# Patient Record
Sex: FEMALE | Race: WHITE | NOT HISPANIC OR LATINO | Employment: FULL TIME | URBAN - METROPOLITAN AREA
[De-identification: names, ages, dates, MRNs, and addresses within clinical notes are randomized per-mention and may not be internally consistent; named-entity substitution may affect disease eponyms.]

---

## 2017-01-09 ENCOUNTER — TELEPHONE (OUTPATIENT)
Dept: RHEUMATOLOGY | Facility: CLINIC | Age: 52
End: 2017-01-09

## 2017-01-09 ENCOUNTER — TELEPHONE (OUTPATIENT)
Dept: NURSING | Facility: CLINIC | Age: 52
End: 2017-01-09

## 2017-01-09 NOTE — TELEPHONE ENCOUNTER
"Call Type: Triage Call    Presenting Problem: Caller is returning call from clinic;  Following  message in EPIC;  \"Wendi Armenta LPN at 1/9/2017 ?4:13 PM  Status: Signed      ? Expand All Collapse All      New to Dr. Flores  please request rheumatology records prior to appointment.  Wendi Armenta LPN        Read to patient verbatim; will call clinic.  Triage Note:  Guideline Title: Information Only Call; No Symptom Triage (Adult) ;  Information Only Call; No Symptom Triage (Adult)  Recommended Disposition: Provide Information or Advice Only  Original Inclination:  Override Disposition:  Intended Action:  Physician Contacted: No  General information question; no triage required. Information provided from  approved references or knowledge of organization. ?  YES  Requesting regular office appointment ? NO  Sign(s) or symptom(s) associated with a diagnosed condition or with a new illness  ? NO  Requesting information about provider, services or community resources ? NO  Call back to complete assessment/clarification of information from prior caller to  complete triage ? NO  Requesting information and provider is best resource; no triage required. ? NO  Caller not with patient and is unable to provide clinical information about  patient to facilitate triage. ? NO  Requesting provider information for recently scheduled test, procedure; no triage  required. Needed information not available per approved resources or clinical  experience. ? NO  Requesting information not available per approved reference or clinical  experience; no triage required. ? NO  Requesting information regarding scheduled exam, test or procedure; no triage  required. Information provided from approved resources or clinical experience. ?  NO  Physician Instructions:  Care Advice:  "

## 2017-01-13 ENCOUNTER — TELEPHONE (OUTPATIENT)
Dept: OBGYN | Facility: CLINIC | Age: 52
End: 2017-01-13

## 2017-01-13 DIAGNOSIS — N95.1 SYMPTOMATIC MENOPAUSAL OR FEMALE CLIMACTERIC STATES: ICD-10-CM

## 2017-01-13 NOTE — TELEPHONE ENCOUNTER
Reason for call:  Patient reporting a symptom    Symptom or request: Was put on the combo patch - has had maybe one week were she hasn't had any bleeding.  Yesterday started bleeding really really heavy - states she has gone through menopause.    Duration (how long have symptoms been present): since yesterday    Have you been treated for this before? No    Additional comments:     Phone Number patient can be reached at:  Home number on file [phone] (home)    Best Time:  any    Can we leave a detailed message on this number:  YES    Call taken on 1/13/2017 at 8:18 AM by Rosie London

## 2017-01-13 NOTE — TELEPHONE ENCOUNTER
Return call to patient.    S-(situation): vaginal bleeding,    B-(background): post menopause    A-(assessment): Patient reports using Combi patch for the last 3 months for menopausal symptoms - patient reports bleeding every day for the last 3 months except for 7 days. Patient reports yesterday bleeding worsened and became heavy. Patient feels today she is saturating more than a pad per hour. Patient denies headache. Patient denies that she is currently dizzy or lightheaded. Patient due to replace patch today.    R-(recommendations): Huddled with Dr. Dumont. Recommendation made and orders received for patient to have complete pelvic US prior to clinic appointment, clinic appointment next week, patient may refrain from replacing patch today and assess bleeding. Other symptoms associated with menopause may return when off Combi patch. Bleeding guidelines/precautions reviewed with patient and when to be seen in the ER. Patient scheduled for appointment next week on 1/17/17.    Patient reports understanding.    Makayla Min   [facility]  RN

## 2017-01-15 ENCOUNTER — HOSPITAL ENCOUNTER (OUTPATIENT)
Dept: ULTRASOUND IMAGING | Facility: CLINIC | Age: 52
Discharge: HOME | End: 2017-01-15
Attending: OBSTETRICS & GYNECOLOGY | Admitting: OBSTETRICS & GYNECOLOGY
Payer: COMMERCIAL

## 2017-01-15 DIAGNOSIS — N95.1 SYMPTOMATIC MENOPAUSAL OR FEMALE CLIMACTERIC STATES: ICD-10-CM

## 2017-01-15 PROCEDURE — 76830 TRANSVAGINAL US NON-OB: CPT

## 2017-01-17 ENCOUNTER — OFFICE VISIT (OUTPATIENT)
Dept: OBGYN | Facility: CLINIC | Age: 52
End: 2017-01-17
Payer: COMMERCIAL

## 2017-01-17 VITALS
BODY MASS INDEX: 23.96 KG/M2 | DIASTOLIC BLOOD PRESSURE: 71 MMHG | HEART RATE: 59 BPM | WEIGHT: 144 LBS | SYSTOLIC BLOOD PRESSURE: 108 MMHG

## 2017-01-17 DIAGNOSIS — N95.0 POSTMENOPAUSAL BLEEDING: Primary | ICD-10-CM

## 2017-01-17 PROCEDURE — 58100 BIOPSY OF UTERUS LINING: CPT | Performed by: OBSTETRICS & GYNECOLOGY

## 2017-01-17 PROCEDURE — 99214 OFFICE O/P EST MOD 30 MIN: CPT | Mod: 25 | Performed by: OBSTETRICS & GYNECOLOGY

## 2017-01-17 PROCEDURE — 88305 TISSUE EXAM BY PATHOLOGIST: CPT | Performed by: OBSTETRICS & GYNECOLOGY

## 2017-01-17 NOTE — PROGRESS NOTES
Postmenopausal bleeding    Ms. Tequila Neumann 51 year old P2 (SVDx2) presents for postmenopausal bleeding in the setting of current hormone replacement therapy to address hot flashes and difficulty sleeping. She was prescribed Combi-patch in 9/2016. She reports bleeding started shortly after initiating the Combi-patch, but was on & off and tolerable. Since Thanksgiving 2016, the bleeding got heavier but was still off and on lasting for only a couple of days. This past week, the bleeding not only remained heavy but was prolonged. It lasted for 5 days before she felt concerned enough to call about her symptoms. She was instructed to stop the Combi-patch and since then she has noted improvement in her bleeding. She reports excellent of her hot flashes and sleeping difficulties with Combi-patch and since stopping it, her symptoms have not recurred. Otherwise, she has no other issues or concerns.     Of note, she has been menopausal for approximately 2 years. She is a current smoker.       Past Medical History   Diagnosis Date     Abnormal Pap smear of cervix 1985     s/p LEEP     Calculus of GB w/ other cystitis 1/3/2012     Past Surgical History   Procedure Laterality Date     Tubal ligation  1998     Lasik  2000     Leep tx, cervical  1985     D & c  x 2     Laparoscopic cholecystectomy  1/25/2012     Procedure:LAPAROSCOPIC CHOLECYSTECTOMY; Laparoscopic vs Open Cholecystectomy; Surgeon:RANDELL MCGARRY; Location:[facility] [facility]     Esophagoscopy, gastroscopy, duodenoscopy (egd), combined  7/16/2012     Procedure: COMBINED ESOPHAGOSCOPY, GASTROSCOPY, DUODENOSCOPY (EGD);;  Surgeon: Lito Kwon MD;  Location:  GI     Arthroscopy knee rt/lt  12/8/2005     Left medial meniscal tear bilat     Hc esoph/gas reflux test w nasal imped electrode  8/23/2012     Procedure: ESOPHAGEAL IMPEDENCE FUNCTION TEST 1 HOUR OR LESS;  Surgeon: Tequila Boone MD;  Location:  GI     Colonoscopy  7/11/2014      Procedure: COLONOSCOPY;  Surgeon: Viridiana Bonilla MD;  Location: [facility]     Current Outpatient Prescriptions   Medication     levothyroxine (SYNTHROID, LEVOTHROID) 125 MCG tablet     estradiol-norethindrone (COMBIPATCH) 0.05-0.14 MG/DAY     clonazePAM (KLONOPIN) 0.5 MG tablet     Multiple Vitamins-Minerals (THRIVE FOR LIFE WOMENS PO)     tiZANidine (ZANAFLEX) 2 MG tablet     propranolol 60 MG TABS     OnabotulinumtoxinA (BOTOX IJ)     Abatacept (ORENCIA) 125 MG/ML SOSY     azaTHIOprine (IMURAN) 50 MG tablet     cholecalciferol (VITAMIN D) 1000 UNIT tablet     No current facility-administered medications for this visit.        Allergies   Allergen Reactions     Sulfa Drugs Nausea     Topamax      Personality change. Memory      Plaquenil [Hydroxychloroquine Sulfate] Rash     C: NEGATIVE for fever, chills, change in weight  R: NEGATIVE for significant cough or SOB  CV: NEGATIVE for chest pain, palpitations or peripheral edema  GI: NEGATIVE for nausea, abdominal pain, heartburn, or change in bowel habits  : NEGATIVE for frequency, dysuria, hematuria, vaginal discharge    Exam:   /71 mmHg  Pulse 59  Wt 144 lb (65.318 kg)  LMP 02/20/2015 (Exact Date)  Breastfeeding? No  General Appearance: NAD  Abdomen: Soft, NT, ND  Pelvic: Normal external female genitalia.  No external lesions, normal hair distribution, no adenopathy. Speculum exam reveals vaginal epithelium well rugated with no abnormal discharge. Cervix appears smooth, pink, with no visible lesions. Bimanual exam reveals normal size uterus, anteverted, non-tender, and mobile. No adnexal masses or tenderness. No cervical motion tenderness.     Procedure: Endometrial biopsy  After informed consent was obtained from the patient, a speculum was placed in the vagina to visualize the cervix which was anteverted.  The cervix was then swabbed with a betadine prep and a paracervical block placed w/ 1% buffered lidocaine.  Tenaculum was applied to the  anterior cervical lip. Endometrial biopsy pipelle could not be passed through the cervical os initially until the cervical os finder was used to dilate the cervix and tissue was obtained.  Uterus sounded to 10 cm.  Tenaculum was removed and sites were hemostatic. There were no complications. The patient tolerated the procedure well with a minimal amount of cramping noted.  Specimen was sent to pathology.    Imaging:   Pelvic US 1/2017  FINDINGS:  Transvaginal images were performed to better evaluate the patient's uterus, ovaries and endometrial stripe.   Four discrete fibroids are noted the largest measuring 4.0 x 2.9 x 3.4 cm which is subserosal in the mid uterus. Additional smaller myometrial fibroids are noted at the fundus and mid uterus posteriorly. The uterus is 8.5 x 5.2 x 6.8 cm. Endometrial stripe measures 4 mm and is normal for patient's age and menstrual status. The right ovary is normal. The left ovary is normal.  No adnexal masses are present. No free pelvic fluid is present.    IMPRESSION: Fibroid uterus.   LAI TAVAREZ MD    A/P:  Postmenopausal bleeding  -- likely from hormone replacement therapy  -- reviewed pelvic ultrasound report and imaging with patient; uncertain if fibroids are contributing to bleeding but given that they are not encroaching the endometrial cavity not likely cause for bleeding  -- endometrial biopsy collected; although low yield for endometrial hyperplasia and cancer given thin endometrial stripe  -- patient elects to not resume hormone replacement therapy at this time and observe for resolution of bleeding; if vasomotor symptoms recur, then reviewed alternative non-hormonal methods given symptoms but also given her current smoking history ie discussed SSRI, gabapentin and clonidine options with associated side effects and relative contraindications   -- patient conveyed understanding of discussion  -- post-biopsy bleeding precautions reviewed    Dalia Dumont,  MD  [facility]

## 2017-01-17 NOTE — NURSING NOTE
"Chief Complaint   Patient presents with     Abnormal Uterine Bleeding     improving       Initial /71 mmHg  Pulse 59  Wt 144 lb (65.318 kg)  LMP 02/20/2015 (Exact Date)  Breastfeeding? No Estimated body mass index is 23.96 kg/(m^2) as calculated from the following:    Height as of 11/30/16: 5' 5\" (1.651 m).    Weight as of this encounter: 144 lb (65.318 kg).  BP completed using cuff size: regular  Katina Mancuso      "

## 2017-01-17 NOTE — PROGRESS NOTES
Quick Note:    Inform patient that her pelvic ultrasound shows four discrete fibroids on her uterus. Emphasize that fibroids are benign. None of them appear to be encroaching in the uterine cavity in order for them to be contributing to the bleeding, but fibroids are responsive to the estrogen in hormone replacement therapy and that might eliciting the bleeding as well. Its hard to say. Maybe lowering the estrogen component of the hormone replacement therapy will hopefully make the difference in preventing her bleeding while at the same time addressing her hot flashes. She will still need to come in for an endometrial biopsy.     Dalia Dumont MD  [facility]      ______

## 2017-01-19 LAB — COPATH REPORT: NORMAL

## 2017-01-19 NOTE — PROGRESS NOTES
Quick Note:    Inform patient that her endometrial biopsy returned negative,     Dalia Dumont MD  [facility]      ______

## 2017-01-20 NOTE — PROGRESS NOTES
Quick Note:    Will forward to [facility] pool for pt notification of normal result.    Makayla Min   [facility] RN    ______

## 2017-01-23 NOTE — PROGRESS NOTES
Quick Note:    In the postmenopausal period, fibroids dont usually present a problem since there is no estrogen feeding them to grow bigger in size. They are 99% benign, so no concern for them at all and no further treatment needed for them    Dalia Dumont MD  [facility]      ______

## 2017-01-23 NOTE — PROGRESS NOTES
Quick Note:    Pt notified of below.  Pt reports understanding.  Pt does not have further questions or concerns.    Patient asking what the next step would be regarding the fibroids?    Makayla Min   [facility] RN      ______

## 2017-03-07 ENCOUNTER — TELEPHONE (OUTPATIENT)
Dept: NEUROLOGY | Facility: CLINIC | Age: 52
End: 2017-03-07

## 2017-03-07 DIAGNOSIS — G25.0 ESSENTIAL TREMOR: ICD-10-CM

## 2017-03-07 RX ORDER — PROPRANOLOL HYDROCHLORIDE 60 MG/1
60 TABLET ORAL 2 TIMES DAILY
Qty: 60 TABLET | Refills: 5 | Status: SHIPPED
Start: 2017-03-07 | End: 2017-11-03

## 2017-03-07 NOTE — TELEPHONE ENCOUNTER
[facility] pharmacy questioning dosage from Rx received today. Propranolol 60 mg 1 tablet twice daily. Verified that pt is on prescribed dose via chart review and progress notes along with prior Rx. [facility] pharmacy did not fill prior Rx and was not aware of the change.   Kassandra DUFFY RN  [facility]

## 2017-03-08 ENCOUNTER — TELEPHONE (OUTPATIENT)
Dept: OBGYN | Facility: CLINIC | Age: 52
End: 2017-03-08

## 2017-03-08 DIAGNOSIS — N95.1 MENOPAUSAL SYNDROME (HOT FLASHES): Primary | ICD-10-CM

## 2017-03-08 NOTE — TELEPHONE ENCOUNTER
1/17/17 office visit notes as below    patient elects to not resume hormone replacement therapy at this time and observe for resolution of bleeding; if vasomotor symptoms recur, then reviewed alternative non-hormonal methods given symptoms but also given her current smoking history ie discussed SSRI, gabapentin and clonidine options with associated side effects and relative contraindications     Please review and advise.  Thank you.    Makayla Min   [facility]  RN

## 2017-03-08 NOTE — TELEPHONE ENCOUNTER
Reason for Call:  Other     Detailed comments: Pt would like something for hot flashes and sleeping. Was seen in January and told maybe gabapentin would be helpful.     PHARMACY:  [facility]    Phone Number Patient can be reached at: Cell number on file:    Telephone Information:   Mobile [phone]       Best Time: Any    Can we leave a detailed message on this number? YES    Call taken on 3/8/2017 at 3:09 PM by Denise Behrendt

## 2017-03-09 RX ORDER — GABAPENTIN 100 MG/1
100 CAPSULE ORAL AT BEDTIME
Qty: 90 CAPSULE | Refills: 3 | Status: SHIPPED | OUTPATIENT
Start: 2017-03-09 | End: 2017-05-22

## 2017-03-09 NOTE — TELEPHONE ENCOUNTER
Inform patient that gabapentin has been prescribed with instructions for 100 mg qHS (preferably one hour before going to bed and not as she is going to bed).   She is then to titrate up every 3 nights by 100 mg if needed for more efficacy of the medication to address her hot flashes until she gets to total dose of 300 mg qHS  If more increase dosing is needed, then it must be divided throughout the day for a total dose of 900 mg/day.   Also the pharmacy that she requesting for me to sent it to does not have e-scribing capabilities apparently. So I have printed it out and put it on Karla's desk for it to be faxed to that pharmacy.     Dalia Dumont MD  [facility]

## 2017-03-13 DIAGNOSIS — G24.3 SPASMODIC TORTICOLLIS: ICD-10-CM

## 2017-03-13 RX ORDER — CLONAZEPAM 0.5 MG/1
0.5 TABLET ORAL AT BEDTIME
Qty: 90 TABLET | Refills: 2 | Status: SHIPPED | OUTPATIENT
Start: 2017-03-13 | End: 2017-09-25

## 2017-03-13 NOTE — TELEPHONE ENCOUNTER
"Refill request for clonazepam 0.5 mg.    clonazePAM (KLONOPIN) 0.5 MG tablet 90 tablet 2 8/19/2016  No      Sig: Take 1 tablet (0.5 mg) by mouth At Bedtime         Routed to Dr Leija for refill. Dr Leija not prescribing physician.   Dr Marie last prescribing.    Per Dr Leija office visit note:  \"We discussed that the clonazepam, if she feels is not effective, should be stopped at some point. She is currently using the medication for sleep. I think it would be safer for her to try an alternative medication. I will defer this to her primary care provider.\"    Please review and advise.  Kassandra DUFFY RN  [facility]             "

## 2017-04-14 ENCOUNTER — TELEPHONE (OUTPATIENT)
Dept: FAMILY MEDICINE | Facility: CLINIC | Age: 52
End: 2017-04-14

## 2017-04-21 NOTE — TELEPHONE ENCOUNTER
Patient would like  To have labs on Friday 4-29-17 or todayif possible   Please call and advise  Rabia Mueller- KATHI

## 2017-04-24 NOTE — TELEPHONE ENCOUNTER
"  S:   Lab Results   Component Value Date    TSH 1.11 08/01/2016    TSH 2.35 01/19/2016       Per chart, she is on :   levothyroxine (SYNTHROID, LEVOTHROID) 125 MCG tablet 90 tablet 3 10/3/2016  No       Sig: Take 1 tablet (125 mcg) by mouth daily     B: I called her. \"I have been really really tired, and I wonder if I can get my thyroid checked, and what about my blood? Maybe I am anemic? \"  Suggested she schedule exam first and she said \"I want the blood tested, I am too busy at work to come in and if he won't order it then I will find me a different doctor. \"    Dr Lindsay is listed PCP, and she last saw him since 6/23/14  Her call initially was to Dr Lindsay.   I do see she was in one time in November 2016 to see Dr Romero,  And also saw Dr Park 9/26/16 for routine health exam.     Will route this note/ request to Dr Romero and Dr Park, who have seen her more recently than Dr Lindsay.     A; asking for labs for thyroid and \"anemia\", are these ok? What diagnosis?   R: note to providers she has seen more recently  Rosa Bradford RNC      "

## 2017-04-24 NOTE — TELEPHONE ENCOUNTER
Message left for pt to call clinic. Will provide below information. That both Dr Lindsay and Dr Baird are recommending that pt be seen. Message has also been routed to Dr Romero. No response from her yet.     Crystal Hernandez RN

## 2017-04-24 NOTE — TELEPHONE ENCOUNTER
Agree that patient needs f/u with FP.  Please assist patient with scheduling follow up visit.      Since she was seen by [facility] 9/2016, will give 30 day susan period, but this issue really should be addressed by PCP.    Radha Baird M.D.

## 2017-04-24 NOTE — TELEPHONE ENCOUNTER
Patient needs to be seen by a provider.  I am not ordering anything at this time since I have not seen her in nearly 3 years.  Sincerely,  Tej Lindsay MD

## 2017-04-25 NOTE — TELEPHONE ENCOUNTER
I agree that she should really come in for an appointment so she can have a more in depth discussion about her symptoms and have a physical exam done.  By just ordering labs without seeing her, we could miss something.      Kanu Romero MD

## 2017-04-25 NOTE — TELEPHONE ENCOUNTER
"Dr Romero, please see note below. You last saw her on 11/30/16.   She is requesting labs.   Others have declined for now.  Do you want to order labs? She was adamant that she is unable to come in for an appt right now and \"just wants labs\"  Rosa Bradford RNC      "

## 2017-05-22 ENCOUNTER — OFFICE VISIT (OUTPATIENT)
Dept: FAMILY MEDICINE | Facility: CLINIC | Age: 52
End: 2017-05-22
Payer: COMMERCIAL

## 2017-05-22 VITALS
HEART RATE: 61 BPM | OXYGEN SATURATION: 97 % | HEIGHT: 65 IN | WEIGHT: 150.2 LBS | DIASTOLIC BLOOD PRESSURE: 77 MMHG | BODY MASS INDEX: 25.02 KG/M2 | TEMPERATURE: 98.1 F | SYSTOLIC BLOOD PRESSURE: 109 MMHG

## 2017-05-22 DIAGNOSIS — E03.9 HYPOTHYROIDISM, UNSPECIFIED TYPE: ICD-10-CM

## 2017-05-22 DIAGNOSIS — R53.83 FATIGUE, UNSPECIFIED TYPE: ICD-10-CM

## 2017-05-22 DIAGNOSIS — G24.3 CERVICAL DYSTONIA: Primary | ICD-10-CM

## 2017-05-22 LAB
CRP SERPL-MCNC: <2.9 MG/L (ref 0–8)
ERYTHROCYTE [DISTWIDTH] IN BLOOD BY AUTOMATED COUNT: 13.1 % (ref 10–15)
ERYTHROCYTE [SEDIMENTATION RATE] IN BLOOD BY WESTERGREN METHOD: 6 MM/H (ref 0–30)
HCT VFR BLD AUTO: 44.3 % (ref 35–47)
HGB BLD-MCNC: 14.4 G/DL (ref 11.7–15.7)
MCH RBC QN AUTO: 32.4 PG (ref 26.5–33)
MCHC RBC AUTO-ENTMCNC: 32.5 G/DL (ref 31.5–36.5)
MCV RBC AUTO: 100 FL (ref 78–100)
PLATELET # BLD AUTO: 197 10E9/L (ref 150–450)
RBC # BLD AUTO: 4.45 10E12/L (ref 3.8–5.2)
T4 FREE SERPL-MCNC: 1.12 NG/DL (ref 0.76–1.46)
TSH SERPL DL<=0.005 MIU/L-ACNC: 12.93 MU/L (ref 0.4–4)
WBC # BLD AUTO: 8.4 10E9/L (ref 4–11)

## 2017-05-22 PROCEDURE — 82306 VITAMIN D 25 HYDROXY: CPT | Performed by: INTERNAL MEDICINE

## 2017-05-22 PROCEDURE — 85652 RBC SED RATE AUTOMATED: CPT | Performed by: INTERNAL MEDICINE

## 2017-05-22 PROCEDURE — 84443 ASSAY THYROID STIM HORMONE: CPT | Performed by: INTERNAL MEDICINE

## 2017-05-22 PROCEDURE — 85027 COMPLETE CBC AUTOMATED: CPT | Performed by: INTERNAL MEDICINE

## 2017-05-22 PROCEDURE — 84439 ASSAY OF FREE THYROXINE: CPT | Performed by: INTERNAL MEDICINE

## 2017-05-22 PROCEDURE — 36415 COLL VENOUS BLD VENIPUNCTURE: CPT | Performed by: INTERNAL MEDICINE

## 2017-05-22 PROCEDURE — 86140 C-REACTIVE PROTEIN: CPT | Performed by: INTERNAL MEDICINE

## 2017-05-22 PROCEDURE — 99214 OFFICE O/P EST MOD 30 MIN: CPT | Performed by: INTERNAL MEDICINE

## 2017-05-22 RX ORDER — LEVOTHYROXINE SODIUM 137 UG/1
125 TABLET ORAL DAILY
Qty: 90 TABLET | Refills: 3 | Status: SHIPPED | OUTPATIENT
Start: 2017-05-22 | End: 2018-07-02

## 2017-05-22 RX ORDER — METHOCARBAMOL 500 MG/1
1000 TABLET, FILM COATED ORAL 3 TIMES DAILY PRN
Qty: 60 TABLET | Refills: 3 | Status: SHIPPED | OUTPATIENT
Start: 2017-05-22 | End: 2019-01-10

## 2017-05-22 NOTE — PROGRESS NOTES
SUBJECTIVE:                                                    Tequila Neumann is a 52 year old female who presents to clinic today for the following health issues:  Chief Complaint   Patient presents with     Thyroid Problem     recheck labs, recent fatigue      Hypothyroidism Follow-up      Since last visit, patient describes the following symptoms: weight gain of 10 lbs and fatigue       Amount of exercise or physical activity: 2-3 days/week for an average of 45-60 minutes, and rides horses.  Walks at work as well.     Problems taking medications regularly: No    Medication side effects: does not feel great most of the time and not sure if it is the medications or cervical dystonia.  Has been taking most medications for a very long time.     Diet: regular (no restrictions) - tries to stay away from processed foods.     She also wanted to address worsening next pain from her cervical dystonia.  She has been on baclofen, Flexeril, and tizanidine without much relief.  Takes propranolol for tremors.  She has had Botox without much relief and did PT which was slightly helpful.  Pain is going into her shoulders, chest, and arms now.  Not sleeping well due to pain.  She wonders if it might be time to try a narcotic.        Problem list and histories reviewed & adjusted, as indicated.  Additional history: as documented    Current Outpatient Prescriptions   Medication Sig Dispense Refill     methocarbamol (ROBAXIN) 500 MG tablet Take 2 tablets (1,000 mg) by mouth 3 times daily as needed for muscle spasms 60 tablet 3     clonazePAM (KLONOPIN) 0.5 MG tablet Take 1 tablet (0.5 mg) by mouth At Bedtime 90 tablet 2     propranolol HCl 60 MG TABS Take 1 tablet (60 mg) by mouth 2 times daily 60 tablet 5     levothyroxine (SYNTHROID, LEVOTHROID) 125 MCG tablet Take 1 tablet (125 mcg) by mouth daily 90 tablet 3     Multiple Vitamins-Minerals (THRIVE FOR LIFE WOMENS PO) Take 1 tablet by mouth daily       tiZANidine (ZANAFLEX) 2 MG  "tablet Take 1-2 tablets (2-4 mg) by mouth 3 times daily as needed for muscle spasms 30 tablet 2     Abatacept (ORENCIA) 125 MG/ML SOSY Inject 125 mg Subcutaneous once a week       azaTHIOprine (IMURAN) 50 MG tablet Take 50 mg by mouth every evening.       cholecalciferol (VITAMIN D) 1000 UNIT tablet Take 1 tablet by mouth daily.       OnabotulinumtoxinA (BOTOX IJ) Inject 145 Units as directed       Allergies   Allergen Reactions     Sulfa Drugs Nausea     Topamax      Personality change. Memory      Plaquenil [Hydroxychloroquine Sulfate] Rash       Reviewed and updated as needed this visit by clinical staff  Tobacco  Allergies  Med Hx  Surg Hx  Fam Hx  Soc Hx      Reviewed and updated as needed this visit by Provider         ROS:  Constitutional, endo, MSK systems are negative, except as otherwise noted.    OBJECTIVE:                                                    /77 (BP Location: Right arm, Patient Position: Chair, Cuff Size: Adult Regular)  Pulse 61  Temp 98.1  F (36.7  C) (Tympanic)  Ht 5' 5\" (1.651 m)  Wt 150 lb 3.2 oz (68.1 kg)  LMP 02/20/2015 (Exact Date)  SpO2 97%  BMI 24.99 kg/m2  Body mass index is 24.99 kg/(m^2).  GENERAL: healthy, alert and no distress  NECK: no adenopathy, no asymmetry, masses, or scars and thyroid normal to palpation  RESP: lungs clear to auscultation - no rales, rhonchi or wheezes  CV: regular rate and rhythm, normal S1 S2, no S3 or S4, no murmur, click or rub, no peripheral edema and peripheral pulses strong    Diagnostic Test Results:  Results for orders placed or performed in visit on 05/22/17 (from the past 24 hour(s))   CBC with platelets   Result Value Ref Range    WBC 8.4 4.0 - 11.0 10e9/L    RBC Count 4.45 3.8 - 5.2 10e12/L    Hemoglobin 14.4 11.7 - 15.7 g/dL    Hematocrit 44.3 35.0 - 47.0 %     78 - 100 fl    MCH 32.4 26.5 - 33.0 pg    MCHC 32.5 31.5 - 36.5 g/dL    RDW 13.1 10.0 - 15.0 %    Platelet Count 197 150 - 450 10e9/L   TSH with free T4 " reflex   Result Value Ref Range    TSH 12.93 (H) 0.40 - 4.00 mU/L   Erythrocyte sedimentation rate auto   Result Value Ref Range    Sed Rate 6 0 - 30 mm/h   CRP inflammation   Result Value Ref Range    CRP Inflammation <2.9 0.0 - 8.0 mg/L   T4 free   Result Value Ref Range    T4 Free 1.12 0.76 - 1.46 ng/dL        ASSESSMENT/PLAN:                                                        1. Cervical dystonia    Worsening pain.  She requested narcotics, and I strongly advised starting these for chronic pain due to risk for long term side effects and addiction.  We are going to try some Robaxin since she hasn't tried that muscle relaxer yet, and she was also interested into referral for acupuncture and pain management clinic.      - ACUPUNCTURE REFERRAL  - methocarbamol (ROBAXIN) 500 MG tablet; Take 2 tablets (1,000 mg) by mouth 3 times daily as needed for muscle spasms  Dispense: 60 tablet; Refill: 3  - [facility] REFERRAL      2. Fatigue, unspecified type    TSH rather elevated though T4 wnl.  Will try increasing levothyroxine slightly from 125mcg to 137mcg.  Repeat TSH in 2 months.  Other labs normal.  Vit D pending.      - CBC with platelets  - TSH with free T4 reflex  - Vitamin D Deficiency  - Erythrocyte sedimentation rate auto  - CRP inflammation    Kanu Romero MD  [facility]

## 2017-05-22 NOTE — NURSING NOTE
"Chief Complaint   Patient presents with     Thyroid Problem     recheck labs, recent fatigue        Initial /77 (BP Location: Right arm, Patient Position: Chair, Cuff Size: Adult Regular)  Pulse 61  Temp 98.1  F (36.7  C) (Tympanic)  Ht 5' 5\" (1.651 m)  Wt 150 lb 3.2 oz (68.1 kg)  LMP 02/20/2015 (Exact Date)  SpO2 97%  BMI 24.99 kg/m2 Estimated body mass index is 24.99 kg/(m^2) as calculated from the following:    Height as of this encounter: 5' 5\" (1.651 m).    Weight as of this encounter: 150 lb 3.2 oz (68.1 kg).  Medication Reconciliation: complete   Tressa JORGE CMA (AAMA)    "

## 2017-05-22 NOTE — MR AVS SNAPSHOT
After Visit Summary   5/22/2017    Tequila Neumann    MRN: [id number]           Patient Information     Date Of Birth          1965        Visit Information        Provider Department      5/22/2017 4:40 PM Kanu Romero MD [facility]        Today's Diagnoses     Cervical dystonia    -  1    Fatigue, unspecified type           Follow-ups after your visit        Additional Services     ACUPUNCTURE REFERRAL       Your provider has referred you to: [facility]: [facility] [place] [phone]   [email or web address]    Please be aware that coverage of these services is subject to the terms and limitations of your health insurance plan.  Call member services at your health plan with any benefit or coverage questions.      Please bring the following with you to your appointment:    (1) Any X-Rays, CTs or MRIs which have been performed.  Contact the facility where they were done to arrange for  prior to your scheduled appointment.  (2) List of current medications   (3) This referral request   (4) Any documents/labs given to you for this referral  Services Requested: Intervention for acupuncture therapy    Please answer the following questions:    1. How long have you been treating this patient for this pain issue?      6 month(s)    2. Have there been any of the following problematic behaviors?      Medication Management Issues: NO      Chemical Dependency: NO      Psychiatric History or Current Psych/Social Issues: NO    3. Has the patient been to any other pain clinics and/or programs?      NO            PA[facility] ([place]) REFERRAL       Your provider has referred you to the [facility].    Reason for Referral: Comprehensive Evaluation and Management    Please complete the following questions:    What is your diagnosis for the patient's pain? Cervical dystonia    Do you have any  specific questions for the pain specialist? No    Are there any red flags that may impact the assessment or management of the patient? None    **ANY DIAGNOSTIC TESTS THAT ARE NOT IN EPIC SHOULD BE SENT TO THE PAIN CENTER**    Please note the Pre-Op Pain Consult must be scheduled 2-3 weeks prior to the patient's surgery.  Patient's trying to schedule within 2 weeks of surgery may not be accommodated.     Pre-Op Pain Consults are only good for 30 days.    REGARDING OPIOID MEDICATIONS:  We will always address appropriateness of opioid pain medications, but we generally will not automatically take on a prescribing role. When we do take on prescribing of opioids for chronic pain, it is in collaboration with the referring physician for an intermediate period of time (months), with an expectation that the primary physician or provider will assume the prescribing role if medications are effective at stable doses with demonstrated compliance.  Therefore, please do not assume that your prescribing responsibilities end on the day of pain clinic consultation.  Is this agreeable to you? YES    For any questions, contact the [facility] at [phone].    Please be aware that coverage of these services is subject to the terms and limitations of your health insurance plan.  Call member services at your health plan with any benefit or coverage questions.      Please bring the following with you to your appointment:    (1) Any X-Rays, CTs or MRIs which have been performed.  Contact the facility where they were done to arrange for  prior to your scheduled appointment.    (2) List of current medications   (3) This referral request   (4) Any documents/labs given to you for this referral                  Who to contact     If you have questions or need follow up information about today's clinic visit or your schedule please contact [facility] directly at [phone].  Normal or non-critical  "lab and imaging results will be communicated to you by MyChart, letter or phone within 4 business days after the clinic has received the results. If you do not hear from us within 7 days, please contact the clinic through E-Buy or phone. If you have a critical or abnormal lab result, we will notify you by phone as soon as possible.  Submit refill requests through E-[employer] or call your pharmacy and they will forward the refill request to us. Please allow 3 business days for your refill to be completed.          Additional Information About Your Visit        Application CraftharEverZero Information     E-Buy lets you send messages to your doctor, view your test results, renew your prescriptions, schedule appointments and more. To sign up, go to [email or web address] . Click on \"Log in\" on the left side of the screen, which will take you to the Welcome page. Then click on \"Sign up Now\" on the right side of the page.     You will be asked to enter the access code listed below, as well as some personal information. Please follow the directions to create your username and password.     Your access code is: [email or web address]  Expires: 2017  5:11 PM     Your access code will  in 90 days. If you need help or a new code, please call your [place] clinic or [phone].        Care EveryWhere ID     This is your Care EveryWhere ID. This could be used by other organizations to access your [place] medical records  [id number]        Your Vitals Were     Pulse Temperature Height Last Period Pulse Oximetry BMI (Body Mass Index)    61 98.1  F (36.7  C) (Tympanic) 5' 5\" (1.651 m) 2015 (Exact Date) 97% 24.99 kg/m2       Blood Pressure from Last 3 Encounters:   17 109/77   17 108/71   16 108/57    Weight from Last 3 Encounters:   17 150 lb 3.2 oz (68.1 kg)   17 144 lb (65.3 kg)   16 144 lb 12.8 oz (65.7 kg)              We Performed the Following     ACUPUNCTURE REFERRAL     CBC with platelets     " CRP inflammation     Erythrocyte sedimentation rate auto     [facility] ([place]) REFERRAL     TSH with free T4 reflex     Vitamin D Deficiency          Today's Medication Changes          These changes are accurate as of: 5/22/17  5:11 PM.  If you have any questions, ask your nurse or doctor.               Start taking these medicines.        Dose/Directions    methocarbamol 500 MG tablet   Commonly known as:  ROBAXIN   Used for:  Cervical dystonia   Started by:  Kanu Romero MD        Dose:  1000 mg   Take 2 tablets (1,000 mg) by mouth 3 times daily as needed for muscle spasms   Quantity:  60 tablet   Refills:  3         Stop taking these medicines if you haven't already. Please contact your care team if you have questions.     estradiol-norethindrone 0.05-0.14 MG/DAY BIW patch   Commonly known as:  COMBIPATCH   Stopped by:  Kanu Romero MD                Where to get your medicines      These medications were sent to [facility] [place], [place] - [address]  [address] [address]     Phone:  [phone]     methocarbamol 500 MG tablet                Primary Care Provider Office Phone # Fax #    Tej Lindsay [phone]       [place] [facility] [address] [address]        Thank you!     Thank you for choosing [facility]  for your care. Our goal is always to provide you with excellent care. Hearing back from our patients is one way we can continue to improve our services. Please take a few minutes to complete the written survey that you may receive in the mail after your visit with us. Thank you!             Your Updated Medication List - Protect others around you: Learn how to safely use, store and throw away your medicines at www.disposemymeds.org.          This list is accurate as of: 5/22/17  5:11 PM.  Always use your most recent med list.                   Brand Name Dispense Instructions for use     azaTHIOprine 50 MG tablet    IMURAN     Take 50 mg by mouth every evening.       BOTOX IJ      Inject 145 Units as directed       cholecalciferol 1000 UNIT tablet    vitamin D     Take 1 tablet by mouth daily.       clonazePAM 0.5 MG tablet    klonoPIN    90 tablet    Take 1 tablet (0.5 mg) by mouth At Bedtime       levothyroxine 125 MCG tablet    SYNTHROID/LEVOTHROID    90 tablet    Take 1 tablet (125 mcg) by mouth daily       methocarbamol 500 MG tablet    ROBAXIN    60 tablet    Take 2 tablets (1,000 mg) by mouth 3 times daily as needed for muscle spasms       ORENCIA 125 MG/ML Sosy   Generic drug:  Abatacept      Inject 125 mg Subcutaneous once a week       propranolol HCl 60 MG Tabs     60 tablet    Take 1 tablet (60 mg) by mouth 2 times daily       THRIVE FOR LIFE WOMENS PO      Take 1 tablet by mouth daily       tiZANidine 2 MG tablet    ZANAFLEX    30 tablet    Take 1-2 tablets (2-4 mg) by mouth 3 times daily as needed for muscle spasms

## 2017-05-23 LAB — DEPRECATED CALCIDIOL+CALCIFEROL SERPL-MC: 40 UG/L (ref 20–75)

## 2017-09-25 DIAGNOSIS — G24.3 SPASMODIC TORTICOLLIS: ICD-10-CM

## 2017-09-25 RX ORDER — CLONAZEPAM 0.5 MG/1
0.5 TABLET ORAL AT BEDTIME
Qty: 90 TABLET | Refills: 2 | Status: SHIPPED | OUTPATIENT
Start: 2017-09-25 | End: 2019-01-10

## 2017-09-25 NOTE — TELEPHONE ENCOUNTER
Received request for refill of Clonazepam 0. 5 mg tablets 1 tab at [facility]. RX authorized by Dr Marie and called in to [facility] [phone] for #90 for 3 month refill total of 3 refills.

## 2017-09-30 ENCOUNTER — HEALTH MAINTENANCE LETTER (OUTPATIENT)
Age: 52
End: 2017-09-30

## 2017-10-23 DIAGNOSIS — G25.0 ESSENTIAL TREMOR: ICD-10-CM

## 2017-10-23 RX ORDER — PROPRANOLOL HYDROCHLORIDE 60 MG/1
60 TABLET ORAL 2 TIMES DAILY
Qty: 60 TABLET | Refills: 5 | Status: CANCELLED | OUTPATIENT
Start: 2017-10-23

## 2017-11-03 RX ORDER — PROPRANOLOL HYDROCHLORIDE 60 MG/1
TABLET ORAL
Qty: 1 TABLET | Refills: 0
Start: 2017-11-03 | End: 2018-09-27

## 2018-01-09 DIAGNOSIS — G25.0 ESSENTIAL TREMOR: ICD-10-CM

## 2018-01-09 RX ORDER — PROPRANOLOL HYDROCHLORIDE 60 MG/1
TABLET ORAL
Qty: 1 TABLET | Refills: 0 | Status: CANCELLED | OUTPATIENT
Start: 2018-01-09

## 2018-01-09 NOTE — TELEPHONE ENCOUNTER
propranolol HCl 60 MG TABS    Date Last Filled: 10/09/2017  QTY: 60    [facility] [place]

## 2018-01-10 NOTE — TELEPHONE ENCOUNTER
"LOV: 8/1/ 2016    \"Plan:  -- Change the propranolol to 60mg twice daily (short acting formulation). Ordered.   -- Tizanidine 2-4 mg up to three times daily. Ordered.  -- Basic labs today for fatigue: CBC, Vitamin D, TSH.  -- Patient will let me know how she is doing in terms of tremor in a few weeks.  -- Return to clinic in 3 months. \"    BP Readings from Last 3 Encounters:   05/22/17 109/77   01/17/17 108/71   11/30/16 108/57   1/17/17   Pulse 59   Does not have future appt scheduled.  Hieu placed to patient to discuss overdue appt.    Left message on answering machine for patient to call back.    Qian Rodriguez RN  [facility]  "

## 2018-01-16 NOTE — TELEPHONE ENCOUNTER
Per pt, seeing new neurologist.  Stated we can cancel the request to our office.  Qian Rodriguez RN  [facility]

## 2018-03-09 ENCOUNTER — TRANSFERRED RECORDS (OUTPATIENT)
Dept: HEALTH INFORMATION MANAGEMENT | Facility: CLINIC | Age: 53
End: 2018-03-09

## 2018-03-16 ENCOUNTER — OFFICE VISIT (OUTPATIENT)
Dept: OBGYN | Facility: CLINIC | Age: 53
End: 2018-03-16
Payer: COMMERCIAL

## 2018-03-16 ENCOUNTER — RESULT FOLLOW UP (OUTPATIENT)
Dept: OBGYN | Facility: CLINIC | Age: 53
End: 2018-03-16

## 2018-03-16 VITALS
RESPIRATION RATE: 16 BRPM | DIASTOLIC BLOOD PRESSURE: 73 MMHG | WEIGHT: 149.8 LBS | BODY MASS INDEX: 24.96 KG/M2 | HEART RATE: 80 BPM | SYSTOLIC BLOOD PRESSURE: 104 MMHG | TEMPERATURE: 98.4 F | HEIGHT: 65 IN

## 2018-03-16 DIAGNOSIS — Z12.4 SCREENING FOR MALIGNANT NEOPLASM OF CERVIX: ICD-10-CM

## 2018-03-16 DIAGNOSIS — Z01.419 ENCOUNTER FOR GYNECOLOGICAL EXAMINATION WITHOUT ABNORMAL FINDING: Primary | ICD-10-CM

## 2018-03-16 DIAGNOSIS — R87.610 ASCUS WITH POSITIVE HIGH RISK HPV CERVICAL: ICD-10-CM

## 2018-03-16 DIAGNOSIS — R87.810 ASCUS WITH POSITIVE HIGH RISK HPV CERVICAL: ICD-10-CM

## 2018-03-16 PROCEDURE — G0124 SCREEN C/V THIN LAYER BY MD: HCPCS | Performed by: OBSTETRICS & GYNECOLOGY

## 2018-03-16 PROCEDURE — 87624 HPV HI-RISK TYP POOLED RSLT: CPT | Performed by: OBSTETRICS & GYNECOLOGY

## 2018-03-16 PROCEDURE — 99396 PREV VISIT EST AGE 40-64: CPT | Performed by: OBSTETRICS & GYNECOLOGY

## 2018-03-16 PROCEDURE — G0145 SCR C/V CYTO,THINLAYER,RESCR: HCPCS | Performed by: OBSTETRICS & GYNECOLOGY

## 2018-03-16 RX ORDER — GABAPENTIN 300 MG/1
300 CAPSULE ORAL
COMMUNITY
Start: 2018-01-16 | End: 2019-01-10

## 2018-03-16 NOTE — MR AVS SNAPSHOT
After Visit Summary   3/16/2018    Tequila Neumann    MRN: [id number]           Patient Information     Date Of Birth          1965        Visit Information        Provider Department      3/16/2018 1:00 PM Iman Park MD [facility]        Today's Diagnoses     Encounter for gynecological examination without abnormal finding    -  1    Screening for malignant neoplasm of cervix          Care Instructions      Preventive Health Recommendations  Female Ages 50 - 64    Yearly exam: See your health care provider every year in order to  o Review health changes.   o Discuss preventive care.    o Review your medicines if your doctor has prescribed any.      Get a Pap test every three years (unless you have an abnormal result and your provider advises testing more often).    If you get Pap tests with HPV test, you only need to test every 5 years, unless you have an abnormal result.     You do not need a Pap test if your uterus was removed (hysterectomy) and you have not had cancer.    You should be tested each year for STDs (sexually transmitted diseases) if you're at risk.     Have a mammogram every 1 to 2 years.    Have a colonoscopy at age 50, or have a yearly FIT test (stool test). These exams screen for colon cancer.      Have a cholesterol test every 5 years, or more often if advised.    Have a diabetes test (fasting glucose) every three years. If you are at risk for diabetes, you should have this test more often.     If you are at risk for osteoporosis (brittle bone disease), think about having a bone density scan (DEXA).    Shots: Get a flu shot each year. Get a tetanus shot every 10 years.    Nutrition:     Eat at least 5 servings of fruits and vegetables each day.    Eat whole-grain bread, whole-wheat pasta and brown rice instead of white grains and rice.    Talk to your provider about Calcium and Vitamin D.     Lifestyle    Exercise at least 150 minutes a week (30  "minutes a day, 5 days a week). This will help you control your weight and prevent disease.    Limit alcohol to one drink per day.    No smoking.     Wear sunscreen to prevent skin cancer.     See your dentist every six months for an exam and cleaning.    See your eye doctor every 1 to 2 years.            Follow-ups after your visit        Who to contact     If you have questions or need follow up information about today's clinic visit or your schedule please contact [facility] directly at [phone].  Normal or non-critical lab and imaging results will be communicated to you by MyChart, letter or phone within 4 business days after the clinic has received the results. If you do not hear from us within 7 days, please contact the clinic through CloudVertical[employer] or phone. If you have a critical or abnormal lab result, we will notify you by phone as soon as possible.  Submit refill requests through H2scan or call your pharmacy and they will forward the refill request to us. Please allow 3 business days for your refill to be completed.          Additional Information About Your Visit        H2scan Information     H2scan lets you send messages to your doctor, view your test results, renew your prescriptions, schedule appointments and more. To sign up, go to [email or web address] . Click on \"Log in\" on the left side of the screen, which will take you to the Welcome page. Then click on \"Sign up Now\" on the right side of the page.     You will be asked to enter the access code listed below, as well as some personal information. Please follow the directions to create your username and password.     Your access code is: [email or web address]  Expires: 2018  1:29 PM     Your access code will  in 90 days. If you need help or a new code, please call your [place] clinic or [phone].        Care EveryWhere ID     This is your Care EveryWhere ID. This could be used by other organizations to access your [place] " "medical records  [id number]        Your Vitals Were     Pulse Temperature Respirations Height Last Period Breastfeeding?    80 98.4  F (36.9  C) (Tympanic) 16 5' 5.25\" (1.657 m) 02/20/2015 (Exact Date) No    BMI (Body Mass Index)                   24.74 kg/m2            Blood Pressure from Last 3 Encounters:   03/16/18 104/73   05/22/17 109/77   01/17/17 108/71    Weight from Last 3 Encounters:   03/16/18 149 lb 12.8 oz (67.9 kg)   05/22/17 150 lb 3.2 oz (68.1 kg)   01/17/17 144 lb (65.3 kg)              We Performed the Following     HPV High Risk Types DNA Cervical     Pap imaged thin layer screen with HPV - recommended age 30 - 65 years (select HPV order below)        Primary Care Provider Office Phone # Fax #    Tej Lindsay [phone] [address] [address]        Equal Access to Services     SUMANTH ATWOOD : Hadii sammi stewart hadasho Soomaali, waaxda luqadaha, qaybta kaalmada adeegyada, waxay emily bowden . So [facility] [phone].    ATENCIÓN: Si habla español, tiene a shelton disposición servicios gratuitos de asistencia lingüística. Ll[facility] [phone].    We comply with applicable federal civil rights laws and [place] laws. We do not discriminate on the basis of race, color, national origin, age, disability, sex, sexual orientation, or gender identity.            Thank you!     Thank you for choosing [facility]  for your care. Our goal is always to provide you with excellent care. Hearing back from our patients is one way we can continue to improve our services. Please take a few minutes to complete the written survey that you may receive in the mail after your visit with us. Thank you!             Your Updated Medication List - Protect others around you: Learn how to safely use, store and throw away your medicines at www.disposemymeds.org.          This list is accurate as of 3/16/18  1:29 PM.  Always use your most recent med list.    "                Brand Name Dispense Instructions for use Diagnosis    azaTHIOprine 50 MG tablet    IMURAN     Take 50 mg by mouth every evening.        BOTOX IJ      Inject 145 Units as directed        cholecalciferol 1000 UNIT tablet    vitamin D3     Take 1 tablet by mouth daily.        clonazePAM 0.5 MG tablet    klonoPIN    90 tablet    Take 1 tablet (0.5 mg) by mouth At Bedtime    Spasmodic torticollis       gabapentin 300 MG capsule    NEURONTIN     Take 300 mg by mouth        levothyroxine 137 MCG tablet    SYNTHROID/LEVOTHROID    90 tablet    Take 1 tablet (137 mcg) by mouth daily    Hypothyroidism, unspecified type       methocarbamol 500 MG tablet    ROBAXIN    60 tablet    Take 2 tablets (1,000 mg) by mouth 3 times daily as needed for muscle spasms    Cervical dystonia       ORENCIA 125 MG/ML Sosy pre-filled syringe   Generic drug:  Abatacept      Inject 125 mg Subcutaneous once a week        propranolol HCl 60 MG Tabs     1 tablet    Disregard Quntity- DENIED- Has not made follow up appt.    Essential tremor       THRIVE FOR LIFE WOMENS PO      Take 1 tablet by mouth daily

## 2018-03-16 NOTE — PROGRESS NOTES
SUBJECTIVE:   CC: Tequila Nuemann is an 53 year old woman who presents for preventive health visit.     Healthy Habits:    Do you get at least three servings of calcium containing foods daily (dairy, green leafy vegetables, etc.)? no, taking calcium and/or vitamin D supplement: yes - vitamin d    Amount of exercise or daily activities, outside of work: 5 day(s) per week    Problems taking medications regularly No    Medication side effects: Yes     Have you had an eye exam in the past two years? no    Do you see a dentist twice per year? yes    Do you have sleep apnea, excessive snoring or daytime drowsiness?yes          Today's PHQ-2 Score:   PHQ-2 ( 1999 Pf[employer]) 3/16/2018 9/26/2016   Q1: Little interest or pleasure in doing things 1 2   Q2: Feeling down, depressed or hopeless 1 0   PHQ-2 Score 2 2       Abuse: Current or Past(Physical, Sexual or Emotional)- No  Do you feel safe in your environment - Yes    Social History   Substance Use Topics     Smoking status: Current Every Day Smoker     Packs/day: 0.50     Years: 30.00     Types: Cigarettes     Smokeless tobacco: Never Used      Comment: 1/2 ppd     Alcohol use Yes      Comment: 3 drinks/week - helps      If you drink alcohol do you typically have >3 drinks per day or >7 drinks per week? No                     Reviewed orders with patient.  Reviewed health maintenance and updated orders accordingly - Yes  Labs reviewed in EPIC  BP Readings from Last 3 Encounters:   03/16/18 104/73   05/22/17 109/77   01/17/17 108/71    Wt Readings from Last 3 Encounters:   03/16/18 149 lb 12.8 oz (67.9 kg)   05/22/17 150 lb 3.2 oz (68.1 kg)   01/17/17 144 lb (65.3 kg)                  Patient Active Problem List   Diagnosis     ESSENTIAL TREMOR      Migraine     Hypothyroidism     Tobacco use disorder     RA (rheumatoid arthritis) (H)     CARDIOVASCULAR SCREENING; LDL GOAL LESS THAN 160     Hx of LASIK     Nutcracker esophagus     Insomnia     Cervical dystonia      Symptomatic menopausal or female climacteric states     Past Surgical History:   Procedure Laterality Date     ARTHROSCOPY KNEE RT/LT  12/8/2005    Left medial meniscal tear bilat     COLONOSCOPY  7/11/2014    Procedure: COLONOSCOPY;  Surgeon: Randell Mcgarry MD;  Location: [facility]     D & C  x 2     ESOPHAGOSCOPY, GASTROSCOPY, DUODENOSCOPY (EGD), COMBINED  7/16/2012    Procedure: COMBINED ESOPHAGOSCOPY, GASTROSCOPY, DUODENOSCOPY (EGD);;  Surgeon: Lito Kwon MD;  Location:  GI     HC ESOPH/GAS REFLUX TEST W NASAL IMPED ELECTRODE  8/23/2012    Procedure: ESOPHAGEAL IMPEDENCE FUNCTION TEST 1 HOUR OR LESS;  Surgeon: Tequila Boone MD;  Location:  GI     LAPAROSCOPIC CHOLECYSTECTOMY  1/25/2012    Procedure:LAPAROSCOPIC CHOLECYSTECTOMY; Laparoscopic vs Open Cholecystectomy; Surgeon:RANDELL MCGARRY; Location:[facility]     [facility]  2000     LEEP TX, CERVICAL  1985     TUBAL LIGATION  1998       Social History   Substance Use Topics     Smoking status: Current Every Day Smoker     Packs/day: 0.50     Years: 30.00     Types: Cigarettes     Smokeless tobacco: Never Used      Comment: 1/2 ppd     Alcohol use Yes      Comment: 3 drinks/week - helps      Family History   Problem Relation Age of Onset     C.A.D. Father      DIABETES Father      Hypertension Father      CEREBROVASCULAR DISEASE Father      Lipids Father      Breast Cancer Maternal Grandmother      age 80+     Alzheimer Disease Maternal Grandmother      age 70+     CANCER Mother      sarcoma, small intestinal     Neurologic Disorder Mother      tremor     CEREBROVASCULAR DISEASE Sister      may have had mild strok     Colon Polyps Sister      Cancer - colorectal No family hx of      Prostate Cancer No family hx of            Patient over age 50, mutual decision to screen reflected in health maintenance.    Pertinent mammograms are reviewed under the imaging tab.  History of abnormal Pap smear: NO - age 30- 65 PAP every 3  "years recommended    Reviewed and updated as needed this visit by clinical staff  Tobacco  Allergies  Meds  Med Hx  Surg Hx  Fam Hx  Soc Hx        Reviewed and updated as needed this visit by Provider            ROS:  C: NEGATIVE for fever, chills, change in weight  INTEGUMENTARY/SKIN: POSITIVE for papular erythematous eruption on buttocks  E: NEGATIVE for vision changes or irritation  ENT: POSITIVE for nasal congestion; patches of intractable dry skin on nose  R: NEGATIVE for significant cough or SOB  B: NEGATIVE for masses, tenderness or discharge  CV: NEGATIVE for chest pain, palpitations or peripheral edema  GI: NEGATIVE for nausea, abdominal pain, heartburn, or change in bowel habits  : NEGATIVE for unusual urinary or vaginal symptoms. No vaginal bleeding.  M: NEGATIVE for significant arthralgias or myalgia  N: NEGATIVE for weakness, dizziness or paresthesias  P: NEGATIVE for changes in mood or affect     OBJECTIVE:   /73 (BP Location: Right arm, Patient Position: Chair, Cuff Size: Adult Regular)  Pulse 80  Temp 98.4  F (36.9  C) (Tympanic)  Resp 16  Ht 5' 5.25\" (1.657 m)  Wt 149 lb 12.8 oz (67.9 kg)  LMP 02/20/2015 (Exact Date)  Breastfeeding? No  BMI 24.74 kg/m2  EXAM:  GENERAL APPEARANCE: healthy, alert and no distress  EYES: Eyes grossly normal to inspection, PERRL and conjunctivae and sclerae normal  HENT: ear canals and TM's normal, nose and mouth without ulcers or lesions, oropharynx clear and oral mucous membranes moist  NECK: no adenopathy, no asymmetry, masses, or scars and thyroid normal to palpation  RESP: lungs clear to auscultation - no rales, rhonchi or wheezes  BREAST: normal without masses, tenderness or nipple discharge and no palpable axillary masses or adenopathy  CV: regular rate and rhythm, normal S1 S2, no S3 or S4, no murmur, click or rub, no peripheral edema and peripheral pulses strong  ABDOMEN: soft, nontender, no hepatosplenomegaly, no masses and bowel sounds " "normal   (female): normal female external genitalia, normal urethral meatus, vaginal mucosal atrophy noted, normal cervix, adnexae, and uterus without masses or abnormal discharge  MS: no musculoskeletal defects are noted and gait is age appropriate without ataxia  SKIN: sl roughened patches on either side of nose; faint papular eruption on left buttocks  NEURO: Normal strength and tone, sensory exam grossly normal, mentation intact and speech normal  PSYCH: mentation appears normal and affect normal/bright    ASSESSMENT/PLAN:       ICD-10-CM    1. Encounter for gynecological examination without abnormal finding Z01.419    2. Screening for malignant neoplasm of cervix Z12.4 Pap imaged thin layer screen with HPV - recommended age 30 - 65 years (select HPV order below)     HPV High Risk Types DNA Cervical       COUNSELING:   Reviewed preventive health counseling, as reflected in patient instructions  Special attention given to:        skin care and protection; dermatology care, mammo screening; bone strength; smoking cessation and avoidance of bladder irritants       Regular exercise       Healthy diet/nutrition       Vision screening         reports that she has been smoking Cigarettes.  She has a 15.00 pack-year smoking history. She has never used smokeless tobacco.  Tobacco Cessation Action Plan: Information offered: Patient not interested at this time  Estimated body mass index is 24.74 kg/(m^2) as calculated from the following:    Height as of this encounter: 5' 5.25\" (1.657 m).    Weight as of this encounter: 149 lb 12.8 oz (67.9 kg).       Counseling Resources:  ATP IV Guidelines  Pooled Cohorts Equation Calculator  Breast Cancer Risk Calculator  FRAX Risk Assessment  ICSI Preventive Guidelines  Dietary Guidelines for Americans, 2010  USDA's MyPlate  ASA Prophylaxis  Lung CA Screening    Iman Park MD  [facility]  "

## 2018-03-16 NOTE — LETTER
April 4, 2019      Tequila NICHOLS Thien  [address]  [place] [address]    Dear MsJenniThien,      At [facility], your health and wellness is our primary concern. That is why we are following up on a colposcopy from 04/18/18. Your provider had recommended that you have a Pap smear and HPV test completed by 04/18/19. Our records do not show that this has been scheduled.    It is important to complete the follow up that your provider has suggested for you to ensure that there are no worsening changes which may, over time, develop into cancer.      Please contact our office at  [phone] to schedule an appointment for a Pap smear and HPV test at your earliest convenience. If you have questions or concerns, please call the clinic and we will be happy to assist you.    If you have completed the tests outside of [facility], please have the results forwarded to our office. We will update the chart for your primary Physician to review before your next annual physical.     Thank you for choosing [facility]!    Sincerely,      Your [facility] Care Team/[facility]

## 2018-03-16 NOTE — NURSING NOTE
"Chief Complaint   Patient presents with     Physical       Initial /73 (BP Location: Right arm, Patient Position: Chair, Cuff Size: Adult Regular)  Pulse 80  Temp 98.4  F (36.9  C) (Tympanic)  Resp 16  Ht 5' 5.25\" (1.657 m)  Wt 149 lb 12.8 oz (67.9 kg)  LMP 02/20/2015 (Exact Date)  Breastfeeding? No  BMI 24.74 kg/m2 Estimated body mass index is 24.74 kg/(m^2) as calculated from the following:    Height as of this encounter: 5' 5.25\" (1.657 m).    Weight as of this encounter: 149 lb 12.8 oz (67.9 kg).  Medication Reconciliation: complete   Kaela Kunz CMA      "

## 2018-03-22 LAB
COPATH REPORT: ABNORMAL
PAP: ABNORMAL

## 2018-03-23 LAB
FINAL DIAGNOSIS: ABNORMAL
HPV HR 12 DNA CVX QL NAA+PROBE: POSITIVE
HPV16 DNA SPEC QL NAA+PROBE: NEGATIVE
HPV18 DNA SPEC QL NAA+PROBE: NEGATIVE
SPECIMEN DESCRIPTION: ABNORMAL
SPECIMEN SOURCE CVX/VAG CYTO: ABNORMAL

## 2018-03-26 PROBLEM — R87.610 ASCUS WITH POSITIVE HIGH RISK HPV CERVICAL: Status: ACTIVE | Noted: 2018-03-16

## 2018-03-26 PROBLEM — R87.810 ASCUS WITH POSITIVE HIGH RISK HPV CERVICAL: Status: ACTIVE | Noted: 2018-03-16

## 2018-03-26 NOTE — PROGRESS NOTES
3/16/18 ASCUS pap, +HR HPV (not 16/18). Plan colp due by 6/18/18 (MRA)  3/26/18 Left message for pt to call back. (MRA)  3/27/18 Clinic RN notified pt. (karina)  4/18/18 Colpo: ECC neg:  cotesting 1 year. (karina)  4/24/18 Pt notified by clinic staff. (karina)  04/04/19 Cotest reminder letter sent. (es)  5/28/19 Patient is lost to follow-up. Routed to provider as FYI. (rlm)

## 2018-04-02 ENCOUNTER — TRANSFERRED RECORDS (OUTPATIENT)
Dept: HEALTH INFORMATION MANAGEMENT | Facility: CLINIC | Age: 53
End: 2018-04-02

## 2018-04-09 ENCOUNTER — OFFICE VISIT (OUTPATIENT)
Dept: DERMATOLOGY | Facility: CLINIC | Age: 53
End: 2018-04-09
Payer: COMMERCIAL

## 2018-04-09 VITALS — OXYGEN SATURATION: 98 % | HEART RATE: 66 BPM | DIASTOLIC BLOOD PRESSURE: 84 MMHG | SYSTOLIC BLOOD PRESSURE: 117 MMHG

## 2018-04-09 DIAGNOSIS — L21.9 DERMATITIS, SEBORRHEIC: Primary | ICD-10-CM

## 2018-04-09 PROCEDURE — 99202 OFFICE O/P NEW SF 15 MIN: CPT | Performed by: PHYSICIAN ASSISTANT

## 2018-04-09 RX ORDER — KETOCONAZOLE 20 MG/G
CREAM TOPICAL
Qty: 30 G | Refills: 3 | Status: SHIPPED | OUTPATIENT
Start: 2018-04-09 | End: 2018-12-19

## 2018-04-09 NOTE — LETTER
4/9/2018         RE: Tequila Neumann  [address]  [place] [address]        Dear Colleague,    Thank you for referring your patient, Tequila Neumann, to the [facility]. Please see a copy of my visit note below.    HPI:   Tequila Neumann is a 53 year old female who presents for evaluation of scaling around the nose  chief complaint  Location: on both sides of the nose   Condition present for:  On and off for awhile.   Previous treatments include: none    Review Of Systems  Eyes: negative  Ears/Nose/Throat: negative  Respiratory: No shortness of breath, dyspnea on exertion, cough, or hemoptysis  Cardiovascular: negative  Gastrointestinal: negative  Genitourinary: negative  Musculoskeletal: negative  Neurologic: negative  Psychiatric: negative        PHYSICAL EXAM:      Skin exam performed as follows: Type 2 skin. Mood appropriate  Alert and Oriented X 3. Well developed, well nourished in no distress.  General appearance: Normal  Head including face: Normal  Eyes: conjunctiva and lids: Normal  Mouth: Lips, teeth, gums: Normal  Neck: Normal  Chest-breast/axillae: Normal  Back: Normal  Spleen and liver: Normal  Cardiovascular: Exam of peripheral vascular system by observation for swelling, varicosities, edema: Normal  Genitalia: groin, buttocks: Normal  Extremities: digits/nails (clubbing): Normal  Eccrine and Apocrine glands: Normal  Right upper extremity: Normal  Left upper extremity: Normal  Right lower extremity: Normal  Left lower extremity: Normal  Skin: Scalp and body hair: See below    1. Flaking and erythema around bilateral ala    ASSESSMENT/PLAN:     Seborrheic dermatitis - advised on chronic, recurrent condition. Discussed that it is a reaction to the normal yeast on the skin. Has tried nothing in the past.   --Start ketoconazole cream BID as needed          Follow-up: PRN  CC:   Scribed By: Alfreda Salas, MS, PA-C      Again, thank you for allowing me to participate in the care of  your patient.        Sincerely,        Alfreda Mckeon PA-C

## 2018-04-09 NOTE — PROGRESS NOTES
HPI:   Tequila Neumann is a 53 year old female who presents for evaluation of scaling around the nose  chief complaint  Location: on both sides of the nose   Condition present for:  On and off for awhile.   Previous treatments include: none    Review Of Systems  Eyes: negative  Ears/Nose/Throat: negative  Respiratory: No shortness of breath, dyspnea on exertion, cough, or hemoptysis  Cardiovascular: negative  Gastrointestinal: negative  Genitourinary: negative  Musculoskeletal: negative  Neurologic: negative  Psychiatric: negative        PHYSICAL EXAM:      Skin exam performed as follows: Type 2 skin. Mood appropriate  Alert and Oriented X 3. Well developed, well nourished in no distress.  General appearance: Normal  Head including face: Normal  Eyes: conjunctiva and lids: Normal  Mouth: Lips, teeth, gums: Normal  Neck: Normal  Chest-breast/axillae: Normal  Back: Normal  Spleen and liver: Normal  Cardiovascular: Exam of peripheral vascular system by observation for swelling, varicosities, edema: Normal  Genitalia: groin, buttocks: Normal  Extremities: digits/nails (clubbing): Normal  Eccrine and Apocrine glands: Normal  Right upper extremity: Normal  Left upper extremity: Normal  Right lower extremity: Normal  Left lower extremity: Normal  Skin: Scalp and body hair: See below    1. Flaking and erythema around bilateral ala    ASSESSMENT/PLAN:     Seborrheic dermatitis - advised on chronic, recurrent condition. Discussed that it is a reaction to the normal yeast on the skin. Has tried nothing in the past.   --Start ketoconazole cream BID as needed          Follow-up: PRN  CC:   Scribed By: Alfreda Salas, MS, PA-C

## 2018-04-09 NOTE — NURSING NOTE
"Initial /84  Pulse 66  LMP 02/20/2015 (Exact Date)  SpO2 98% Estimated body mass index is 24.74 kg/(m^2) as calculated from the following:    Height as of 3/16/18: 1.657 m (5' 5.25\").    Weight as of 3/16/18: 67.9 kg (149 lb 12.8 oz). .      "

## 2018-04-09 NOTE — MR AVS SNAPSHOT
"              After Visit Summary   4/9/2018    Tequila Neumann    MRN: [id number]           Patient Information     Date Of Birth          1965        Visit Information        Provider Department      4/9/2018 10:45 AM Alfreda Salas PA-C [facility]        Today's Diagnoses     Dermatitis, seborrheic    -  1       Follow-ups after your visit        Your next 10 appointments already scheduled     Apr 18, 2018  4:15 PM CDT   Colposcopy with Iman Park MD, [facility]   [facility]    [address] [address]   [phone]            May 30, 2018  8:15 AM CDT   PROCEDURE with Arnoldo Murrieta MD   [facility]    [address] [address]   [phone]              Who to contact     If you have questions or need follow up information about today's clinic visit or your schedule please contact [facility] directly at [phone].  Normal or non-critical lab and imaging results will be communicated to you by Zumperhart, letter or phone within 4 business days after the clinic has received the results. If you do not hear from us within 7 days, please contact the clinic through Zumperhart or phone. If you have a critical or abnormal lab result, we will notify you by phone as soon as possible.  Submit refill requests through [email or web address] or call your pharmacy and they will forward the refill request to us. Please allow 3 business days for your refill to be completed.          Additional Information About Your Visit        MyChart Information     [email or web address] lets you send messages to your doctor, view your test results, renew your prescriptions, schedule appointments and more. To sign up, go to [email or web address] . Click on \"Log in\" on the left side of the screen, which will take you to the Welcome page. Then click on \"Sign up Now\" on the right side " of the page.     You will be asked to enter the access code listed below, as well as some personal information. Please follow the directions to create your username and password.     Your access code is: [email or web address]  Expires: 2018  1:29 PM     Your access code will  in 90 days. If you need help or a new code, please call your [facility] clinic or [phone].        Care EveryWhere ID     This is your Care EveryWhere ID. This could be used by other organizations to access your [facility] medical records  [id number]        Your Vitals Were     Pulse Last Period Pulse Oximetry             66 2015 (Exact Date) 98%          Blood Pressure from Last 3 Encounters:   18 117/84   18 104/73   17 109/77    Weight from Last 3 Encounters:   18 67.9 kg (149 lb 12.8 oz)   17 68.1 kg (150 lb 3.2 oz)   17 65.3 kg (144 lb)              Today, you had the following     No orders found for display         Today's Medication Changes          These changes are accurate as of 18  4:07 PM.  If you have any questions, ask your nurse or doctor.               Start taking these medicines.        Dose/Directions    ketoconazole 2 % cream   Commonly known as:  NIZORAL   Used for:  Dermatitis, seborrheic   Started by:  Alfreda Salas PA-C        Apply to AA BID PRN   Quantity:  30 g   Refills:  3            Where to get your medicines      These medications were sent to [facility] [address]  [address] [address]     Phone:  [phone]     ketoconazole 2 % cream                Primary Care Provider Office Phone # Fax #    Tej Lindsay [phone]       [address] [address]        Equal Access to Services     SUMANTH ATWOOD : Hosea Manrique, argentina dunbar, qagiovannata kaalmabyron negrete. So [facility] [phone].    ATENCIÓN: Si kelli lovett,  tiene a shelton disposición servicios gratuitos de asistencia lingüística. Johana buck [phone].    We comply with applicable federal civil rights laws and [place] laws. We do not discriminate on the basis of race, color, national origin, age, disability, sex, sexual orientation, or gender identity.            Thank you!     Thank you for choosing [facility]  for your care. Our goal is always to provide you with excellent care. Hearing back from our patients is one way we can continue to improve our services. Please take a few minutes to complete the written survey that you may receive in the mail after your visit with us. Thank you!             Your Updated Medication List - Protect others around you: Learn how to safely use, store and throw away your medicines at www.disposemymeds.org.          This list is accurate as of 4/9/18  4:07 PM.  Always use your most recent med list.                   Brand Name Dispense Instructions for use Diagnosis    azaTHIOprine 50 MG tablet    IMURAN     Take 50 mg by mouth every evening.        BOTOX IJ      Inject 145 Units as directed        cholecalciferol 1000 UNIT tablet    vitamin D3     Take 1 tablet by mouth daily.        clonazePAM 0.5 MG tablet    klonoPIN    90 tablet    Take 1 tablet (0.5 mg) by mouth At Bedtime    Spasmodic torticollis       gabapentin 300 MG capsule    NEURONTIN     Take 300 mg by mouth        ketoconazole 2 % cream    NIZORAL    30 g    Apply to AA BID PRN    Dermatitis, seborrheic       levothyroxine 137 MCG tablet    SYNTHROID/LEVOTHROID    90 tablet    Take 1 tablet (137 mcg) by mouth daily    Hypothyroidism, unspecified type       methocarbamol 500 MG tablet    ROBAXIN    60 tablet    Take 2 tablets (1,000 mg) by mouth 3 times daily as needed for muscle spasms    Cervical dystonia       ORENCIA 125 MG/ML Sosy pre-filled syringe   Generic drug:  Abatacept      Inject 125 mg Subcutaneous once a week        propranolol HCl 60 MG Tabs      1 tablet    Disregard Quntity- DENIED- Has not made follow up appt.    Essential tremor       THRIVE FOR LIFE WOMENS PO      Take 1 tablet by mouth daily

## 2018-04-10 ENCOUNTER — TRANSFERRED RECORDS (OUTPATIENT)
Dept: HEALTH INFORMATION MANAGEMENT | Facility: CLINIC | Age: 53
End: 2018-04-10

## 2018-04-18 ENCOUNTER — OFFICE VISIT (OUTPATIENT)
Dept: OBGYN | Facility: CLINIC | Age: 53
End: 2018-04-18
Payer: COMMERCIAL

## 2018-04-18 VITALS
HEART RATE: 69 BPM | WEIGHT: 149 LBS | TEMPERATURE: 97.1 F | RESPIRATION RATE: 18 BRPM | HEIGHT: 65 IN | BODY MASS INDEX: 24.83 KG/M2 | DIASTOLIC BLOOD PRESSURE: 70 MMHG | SYSTOLIC BLOOD PRESSURE: 115 MMHG

## 2018-04-18 DIAGNOSIS — R87.610 ASCUS WITH POSITIVE HIGH RISK HPV CERVICAL: Primary | ICD-10-CM

## 2018-04-18 DIAGNOSIS — R87.810 ASCUS WITH POSITIVE HIGH RISK HPV CERVICAL: Primary | ICD-10-CM

## 2018-04-18 LAB
SPECIMEN SOURCE: NORMAL
WET PREP SPEC: NORMAL

## 2018-04-18 PROCEDURE — 87210 SMEAR WET MOUNT SALINE/INK: CPT | Performed by: OBSTETRICS & GYNECOLOGY

## 2018-04-18 PROCEDURE — 57456 ENDOCERV CURETTAGE W/SCOPE: CPT | Performed by: OBSTETRICS & GYNECOLOGY

## 2018-04-18 PROCEDURE — 88305 TISSUE EXAM BY PATHOLOGIST: CPT | Performed by: OBSTETRICS & GYNECOLOGY

## 2018-04-18 NOTE — PROGRESS NOTES
Tequila  Your results are normal.  If you have any other questions or concerns, please followup in the office or contact us on mychart or evisit.    Livier Malin

## 2018-04-18 NOTE — PROGRESS NOTES
Tequila is a 53 year old  female who presents for colposcopy due to Pap smear on 3/16/18 showing, Abnormal, ASCUS, HPV positive type HR.  She is not  having postcoital bleeding.; She reports some green type discharge without itching or burning    Patient Active Problem List    Diagnosis Date Noted     ASCUS with positive high risk HPV cervical 2018     Priority: Medium     3/16/18 ASCUS pap,  + HR HPV (not 16/18). Plan colp.       Symptomatic menopausal or female climacteric states 2016     Priority: Medium     Cervical dystonia 2013     Priority: Medium     Hx of LASIK 10/11/2012     Priority: Medium     1993  Wear glasses at night for driving       Nutcracker esophagus 10/11/2012     Priority: Medium     Insomnia 10/11/2012     Priority: Medium     CARDIOVASCULAR SCREENING; LDL GOAL LESS THAN 160 2011     Priority: Medium     RA (rheumatoid arthritis) (H) 2011     Priority: Medium     Tobacco use disorder 2006     Priority: Medium     ESSENTIAL TREMOR  2005     Priority: Medium     Atenolol       Migraine 2005     Priority: Medium     On propranolol  Problem list name updated by automated process. Provider to review       Hypothyroidism 2005     Priority: Medium     Synthroid   Problem list name updated by automated process. Provider to review         Past Medical History:   Diagnosis Date     Abnormal Pap smear of cervix 1985    s/p LEEP     Abnormal Pap smear of cervix 2018    See problem list     Calculus of GB w/ other cystitis 1/3/2012     Cervical high risk HPV (human papillomavirus) test positive 2018    See problem list       Past Surgical History:   Procedure Laterality Date     ARTHROSCOPY KNEE RT/LT  2005    Left medial meniscal tear bilat     COLONOSCOPY  2014    Procedure: COLONOSCOPY;  Surgeon: Viridiana Bonilla MD;  Location: [facility]     D & C  x 2     ESOPHAGOSCOPY, GASTROSCOPY, DUODENOSCOPY (EGD), COMBINED   "7/16/2012    Procedure: COMBINED ESOPHAGOSCOPY, GASTROSCOPY, DUODENOSCOPY (EGD);;  Surgeon: Lito Kwon MD;  Location:  GI     HC ESOPH/GAS REFLUX TEST W NASAL IMPED ELECTRODE  8/23/2012    Procedure: ESOPHAGEAL IMPEDENCE FUNCTION TEST 1 HOUR OR LESS;  Surgeon: Tequila Boone MD;  Location: [facility]     LAPAROSCOPIC CHOLECYSTECTOMY  1/25/2012    Procedure:LAPAROSCOPIC CHOLECYSTECTOMY; Laparoscopic vs Open Cholecystectomy; Surgeon:RANDELL MCGARRY; Location:[facility]     [facility]  2000     LEEP TX, CERVICAL  1985     TUBAL LIGATION  1998       Methotrexate; Sulfa drugs; Topamax; and Plaquenil [hydroxychloroquine sulfate]    Current Birth Control Method: none  Smoker: yes  Hx of Genital Herpes?: no  HIV Status: unknown  Hx of partners with Genital Warts?: no    Previous Cryo?: no  Previous LEEP?: no  Previous Laser?: no    Referred by:  self    /70 (BP Location: Right arm, Patient Position: Chair, Cuff Size: Adult Small)  Pulse 69  Temp 97.1  F (36.2  C) (Tympanic)  Resp 18  Ht 5' 5.25\" (1.657 m)  Wt 149 lb (67.6 kg)  LMP 02/20/2015 (Exact Date)  BMI 24.61 kg/m2    Patient's last menstrual period was 02/20/2015 (exact date).    Written informed consent obtained.  A colposcopic exam  was performed using plain view,  And acetic acid staining of the cervix/vaginal vault.  Specimens were taken, as indicated below, either from cervix or vaginal vault with biopsy forceps and if indicated, with an endocervical sampler.  Specimens were submitted in formalin for pathologic evaluation    COLPOSCOPIC EXAM: satisfied    Examined without staining?: yes - smooth; wet mount negative  Examined with Acetic Acid staining?: yes - no AWE  Examined with Lugol's Iodine staining?: no  Vagina examined?: no  Vulva examined?: no    No images are attached to the encounter.       Specimens taken: endocervical curretage    ASSESSMENT:  ASCUS, with high risk HPV    PLAN: Specimens labelled and sent to " pathology., Will base further treatment on pathology findings. and post biopsy instructions given to patient  Iman Park MD  [facility]

## 2018-04-18 NOTE — MR AVS SNAPSHOT
"              After Visit Summary   4/18/2018    Tequila Neumann    MRN: [id number]           Patient Information     Date Of Birth          1965        Visit Information        Provider Department      4/18/2018 4:15 PM Iman Park MD; [facility]        Today's Diagnoses     ASCUS with positive high risk HPV cervical    -  1       Follow-ups after your visit        Your next 10 appointments already scheduled     May 30, 2018  8:15 AM CDT   PROCEDURE with Arnoldo Murrieta MD   [facility] ([facility]    [address] [address]   [phone]              Who to contact     If you have questions or need follow up information about today's clinic visit or your schedule please contact [facility] directly at [phone].  Normal or non-critical lab and imaging results will be communicated to you by MyChart, letter or phone within 4 business days after the clinic has received the results. If you do not hear from us within 7 days, please contact the clinic through MyChart or phone. If you have a critical or abnormal lab result, we will notify you by phone as soon as possible.  Submit refill requests through [employer] or call your pharmacy and they will forward the refill request to us. Please allow 3 business days for your refill to be completed.          Additional Information About Your Visit        MyChart Information     [employer] lets you send messages to your doctor, view your test results, renew your prescriptions, schedule appointments and more. To sign up, go to [email or web address] . Click on \"Log in\" on the left side of the screen, which will take you to the Welcome page. Then click on \"Sign up Now\" on the right side of the page.     You will be asked to enter the access code listed below, as well as some personal information. Please follow the directions to create your username and " "password.     Your access code is: [email or web address]  Expires: 2018  1:29 PM     Your access code will  in 90 days. If you need help or a new code, please call your [facility] clinic or [phone].        Care EveryWhere ID     This is your Care EveryWhere ID. This could be used by other organizations to access your [facility] medical records  [id number]        Your Vitals Were     Pulse Temperature Respirations Height Last Period BMI (Body Mass Index)    69 97.1  F (36.2  C) (Tympanic) 18 5' 5.25\" (1.657 m) 2015 (Exact Date) 24.61 kg/m2       Blood Pressure from Last 3 Encounters:   18 115/70   18 117/84   18 104/73    Weight from Last 3 Encounters:   18 149 lb (67.6 kg)   18 149 lb 12.8 oz (67.9 kg)   17 150 lb 3.2 oz (68.1 kg)              We Performed the Following     COLP CERVIX/UPPER VAGINA W ENDOCERV CURETT        Primary Care Provider Office Phone # Fax #    Tej Lindsay [phone] [address] [facility] [address]        Equal Access to Services     SUMANTH ATWOOD AH: Hadii sammi ku hadasho Soomaali, waaxda luqadaha, qaybta kaalmada adeegyada, waxay idiin hayaan yola bowden . So [facility] [phone].    ATENCIÓN: Si habla español, tiene a shelton disposición servicios gratuitos de asistencia lingüística. Llame al [phone].    We comply with applicable federal civil rights laws and [place] laws. We do not discriminate on the basis of race, color, national origin, age, disability, sex, sexual orientation, or gender identity.            Thank you!     Thank you for choosing [facility]  for your care. Our goal is always to provide you with excellent care. Hearing back from our patients is one way we can continue to improve our services. Please take a few minutes to complete the written survey that you may receive in the mail after your visit with us. Thank you!             Your Updated Medication List - Protect " others around you: Learn how to safely use, store and throw away your medicines at www.disposemymeds.org.          This list is accurate as of 4/18/18  4:38 PM.  Always use your most recent med list.                   Brand Name Dispense Instructions for use Diagnosis    azaTHIOprine 50 MG tablet    IMURAN     Take 50 mg by mouth every evening.        BOTOX IJ      Inject 145 Units as directed        cholecalciferol 1000 UNIT tablet    vitamin D3     Take 1 tablet by mouth daily.        clonazePAM 0.5 MG tablet    klonoPIN    90 tablet    Take 1 tablet (0.5 mg) by mouth At Bedtime    Spasmodic torticollis       gabapentin 300 MG capsule    NEURONTIN     Take 300 mg by mouth        ketoconazole 2 % cream    NIZORAL    30 g    Apply to AA BID PRN    Dermatitis, seborrheic       levothyroxine 137 MCG tablet    SYNTHROID/LEVOTHROID    90 tablet    Take 1 tablet (137 mcg) by mouth daily    Hypothyroidism, unspecified type       methocarbamol 500 MG tablet    ROBAXIN    60 tablet    Take 2 tablets (1,000 mg) by mouth 3 times daily as needed for muscle spasms    Cervical dystonia       ORENCIA 125 MG/ML Sosy pre-filled syringe   Generic drug:  Abatacept      Inject 125 mg Subcutaneous once a week        propranolol HCl 60 MG Tabs     1 tablet    Disregard Quntity- DENIED- Has not made follow up appt.    Essential tremor       THRIVE FOR LIFE WOMENS PO      Take 1 tablet by mouth daily

## 2018-04-18 NOTE — NURSING NOTE
"Initial /70 (BP Location: Right arm, Patient Position: Chair, Cuff Size: Adult Small)  Pulse 69  Temp 97.1  F (36.2  C) (Tympanic)  Resp 18  Ht 5' 5.25\" (1.657 m)  Wt 149 lb (67.6 kg)  LMP 02/20/2015 (Exact Date)  BMI 24.61 kg/m2 Estimated body mass index is 24.61 kg/(m^2) as calculated from the following:    Height as of this encounter: 5' 5.25\" (1.657 m).    Weight as of this encounter: 149 lb (67.6 kg). .      "

## 2018-04-23 LAB — COPATH REPORT: NORMAL

## 2018-04-24 NOTE — PROGRESS NOTES
Call to pt to notify of below.  Unable to reach.  Left message for pt to call back     Makayla Min   [facility]  RN

## 2018-05-30 ENCOUNTER — OFFICE VISIT (OUTPATIENT)
Dept: DERMATOLOGY | Facility: CLINIC | Age: 53
End: 2018-05-30
Payer: COMMERCIAL

## 2018-05-30 ENCOUNTER — TELEPHONE (OUTPATIENT)
Dept: DERMATOLOGY | Facility: CLINIC | Age: 53
End: 2018-05-30

## 2018-05-30 VITALS — OXYGEN SATURATION: 97 % | DIASTOLIC BLOOD PRESSURE: 73 MMHG | HEART RATE: 74 BPM | SYSTOLIC BLOOD PRESSURE: 106 MMHG

## 2018-05-30 DIAGNOSIS — L72.0 EIC (EPIDERMAL INCLUSION CYST): Primary | ICD-10-CM

## 2018-05-30 PROCEDURE — 11441 EXC FACE-MM B9+MARG 0.6-1 CM: CPT | Mod: 51 | Performed by: DERMATOLOGY

## 2018-05-30 PROCEDURE — 13131 CMPLX RPR F/C/C/M/N/AX/G/H/F: CPT | Performed by: DERMATOLOGY

## 2018-05-30 PROCEDURE — 88331 PATH CONSLTJ SURG 1 BLK 1SPC: CPT | Performed by: DERMATOLOGY

## 2018-05-30 NOTE — PATIENT INSTRUCTIONS
Sutured Wound Care     [facility]: [phone]    [facility]: [phone]          ? No strenuous activity for 48 hours. Resume moderate activity in 48 hours. No heavy exercising until you are seen for follow up in one week.     ? Take Tylenol as needed for discomfort.                         ? Do not drink alcoholic beverages for 48 hours.     ? Keep the pressure bandage in place for 24 hours. If the bandage becomes blood tinged or loose, reinforce it with gauze and tape.        (Refer to the reverse side of this page for management of bleeding).    ? Remove pressure bandage in 24 hours     ? Leave the flat bandage in place until your follow up appointment.    ? Keep the bandage dry. Wash around it carefully.    ? If the tape becomes soiled or starts to come off, reinforce it with additional paper tape.    ? Do not smoke for 3 weeks; smoking is detrimental to wound healing.    ? It is normal to have swelling and bruising around the surgical site. The bruising will fade in approximately 10-14 days. Elevate the area to reduce swelling.    ? Numbness, itchiness and sensitivity to temperature changes can occur after surgery and may take up to 18 months to normalize.      POSSIBLE COMPLICATIONS    BLEEDIN. Leave the bandage in place.  2. Use tightly rolled up gauze or a cloth to apply direct pressure over the bandage for 20   minutes.  3. Reapply pressure for an additional 20 minutes if necessary  4. Call the office or go to the nearest emergency room if pressure fails to stop the bleeding.  5. Use additional gauze and tape to maintain pressure once the bleeding has stopped.        PAIN:    1. Post operative pain should slowly get better, never worse.  2. A severe increase in pain may indicate a problem. Call the office if this occurs.    In case of emergency phone:Dr Murrieta [phone]

## 2018-05-30 NOTE — PROGRESS NOTES
Tequila Neumann is a 53 year old year old female patient here today for evaluation and managment of eic on lft cheek.  .  Patient states this has been present for a while.  Patient reports the following symptoms:  Growing and drainging.  .  Patient reports the following previous treatments none.  Patient reports the following modifying factors none.  Associated symptoms: none.  Patient has no other skin complaints today.  Remainder of the HPI, Meds, PMH, Allergies, FH, and SH was reviewed in chart.      Past Medical History:   Diagnosis Date     Abnormal Pap smear of cervix 1985    s/p LEEP     Abnormal Pap smear of cervix 03/16/2018    See problem list     Calculus of GB w/ other cystitis 1/3/2012     Cervical high risk HPV (human papillomavirus) test positive 03/16/2018    See problem list       Past Surgical History:   Procedure Laterality Date     ARTHROSCOPY KNEE RT/LT  12/8/2005    Left medial meniscal tear bilat     COLONOSCOPY  7/11/2014    Procedure: COLONOSCOPY;  Surgeon: Randell Bonilla MD;  Location: [facility]     D & C  x 2     ESOPHAGOSCOPY, GASTROSCOPY, DUODENOSCOPY (EGD), COMBINED  7/16/2012    Procedure: COMBINED ESOPHAGOSCOPY, GASTROSCOPY, DUODENOSCOPY (EGD);;  Surgeon: Lito Kwon MD;  Location: [facility]     HC ESOPH/GAS REFLUX TEST W NASAL IMPED ELECTRODE  8/23/2012    Procedure: ESOPHAGEAL IMPEDENCE FUNCTION TEST 1 HOUR OR LESS;  Surgeon: Tequila Boone MD;  Location: [facility]     LAPAROSCOPIC CHOLECYSTECTOMY  1/25/2012    Procedure:LAPAROSCOPIC CHOLECYSTECTOMY; Laparoscopic vs Open Cholecystectomy; Surgeon:RANDELL BONILLA; Location:[facility]     LAS  2000     LEEP TX, CERVICAL  1985     TUBAL LIGATION  1998        Family History   Problem Relation Age of Onset     C.A.D. Father      DIABETES Father      Hypertension Father      CEREBROVASCULAR DISEASE Father      Lipids Father      Breast Cancer Maternal Grandmother      age 80+     Alzheimer Disease Maternal  Grandmother      age 70+     CANCER Mother      sarcoma, small intestinal     Neurologic Disorder Mother      tremor     CEREBROVASCULAR DISEASE Sister      may have had mild strok     Colon Polyps Sister      Cancer - colorectal No family hx of      Prostate Cancer No family hx of        Social History     Social History     Marital status: Single     Spouse name: N/A     Number of children: 2     Years of education: N/A     Occupational History     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 No     Social History Narrative        Mother was 100% American    Thien is Turk[place]         [place]    [place], [place] area            Tremor in her mother - had head tremor and hand tremor    Maternal grandmother with alzheimers disease    Parents disease  young    1 sister healthy    2: Biological children: 2 daughthers: 28 yrs and 29 yrs old: [place] and [facility]    Lives with significant other in [facility]    Sometimes drinks alcohol which helps her tremor     for [employer]; works in maple wood                   Outpatient Encounter Prescriptions as of 2018   Medication Sig Dispense Refill     Abatacept (ORENCIA) 125 MG/ML SOSY Inject 125 mg Subcutaneous once a week       azaTHIOprine (IMURAN) 50 MG tablet Take 50 mg by mouth every evening.       cholecalciferol (VITAMIN D) 1000 UNIT tablet Take 1 tablet by mouth daily.       clonazePAM (KLONOPIN) 0.5 MG tablet Take 1 tablet (0.5 mg) by mouth At Bedtime 90 tablet 2      gabapentin (NEURONTIN) 300 MG capsule Take 300 mg by mouth       ketoconazole (NIZORAL) 2 % cream Apply to AA BID PRN 30 g 3     levothyroxine (SYNTHROID/LEVOTHROID) 137 MCG tablet Take 1 tablet (137 mcg) by mouth daily 90 tablet 3     methocarbamol (ROBAXIN) 500 MG tablet Take 2 tablets (1,000 mg) by mouth 3 times daily as needed for muscle spasms 60 tablet 3     Multiple Vitamins-Minerals (THRIVE FOR LIFE WOMENS PO) Take 1 tablet by mouth daily       OnabotulinumtoxinA (BOTOX IJ) Inject 145 Units as directed       propranolol HCl 60 MG TABS Disregard Quntity- DENIED- Has not made follow up appt. 1 tablet 0     No facility-administered encounter medications on file as of 5/30/2018.              Review Of Systems  Skin: As above  Eyes: negative  Ears/Nose/Throat: negative  Respiratory: No shortness of breath, dyspnea on exertion, cough, or hemoptysis  Cardiovascular: negative  Gastrointestinal: negative  Genitourinary: negative  Musculoskeletal: negative  Neurologic: negative  Psychiatric: negative  Hematologic/Lymphatic/Immunologic: negative  Endocrine: negative      O:   NAD, WDWN, Alert & Oriented, Mood & Affect wnl, Vitals stable   Here today alone   /73 (BP Location: Right arm, Patient Position: Chair, Cuff Size: Adult Regular)  Pulse 74  LMP 02/20/2015 (Exact Date)  SpO2 97%   General appearance normal   Vitals stable   Alert, oriented and in no acute distress     L cheek 1cm firm nodule with whitepapule          Eyes: Conjunctivae/lids:Normal     ENT: Lips, buccal mucosa, tongue: normal    MSK:Normal    Cardiovascular: peripheral edema none    Pulm: Breathing Normal    Neuro/Psych: Orientation:Normal; Mood/Affect:Normal      MICRO:   L cheek: Normal epidermis with cyst lined by stratified squamous epithelium with epidermal keratinization with overlying connection to epidermis.    A/P:  1. L cheek eic  EXCISION OF CYST AND COMPLEX: After thorough discussion of PGACAC, consent obtained, anesthesia and  prep, the margins of the cyst were identified and an elliptical incision was made encompassing the cyst. The incisions were made through the skin and down to and including the subcutaneous tissue. The cyst was removed en bloc and submitted for frozen pathologic review. The wound edges were widely undermined until adequate tissue mobility was obtained. hemostasis was achieved. The wound edges were then closed in a layered fashion, being careful not to leave any dead space. Postoperative length was 1.3 cm.   EBL minimal; complications none; wound care routine. The patient was discharged in good condition and will return in one week for wound evaluation.

## 2018-05-30 NOTE — NURSING NOTE
"Chief Complaint   Patient presents with     Derm Problem     cyst removal from left cheek       Initial /73 (BP Location: Right arm, Patient Position: Chair, Cuff Size: Adult Regular)  Pulse 74  LMP 02/20/2015 (Exact Date)  SpO2 97% Estimated body mass index is 24.61 kg/(m^2) as calculated from the following:    Height as of 4/18/18: 1.657 m (5' 5.25\").    Weight as of 4/18/18: 67.6 kg (149 lb).  Medications and allergies reviewed.    Zander PRATHER, MARÍA    "

## 2018-05-30 NOTE — MR AVS SNAPSHOT
After Visit Summary   2018    Tequila Neumann    MRN: [id number]           Patient Information     Date Of Birth          1965        Visit Information        Provider Department      2018 8:15 AM Arnoldo Murrieta MD [facility]        Today's Diagnoses     EIC (epidermal inclusion cyst)    -  1      Care Instructions      Sutured Wound Care     [facility]: [phone]    [facility]: [phone]          ? No strenuous activity for 48 hours. Resume moderate activity in 48 hours. No heavy exercising until you are seen for follow up in one week.     ? Take Tylenol as needed for discomfort.                         ? Do not drink alcoholic beverages for 48 hours.     ? Keep the pressure bandage in place for 24 hours. If the bandage becomes blood tinged or loose, reinforce it with gauze and tape.        (Refer to the reverse side of this page for management of bleeding).    ? Remove pressure bandage in 24 hours     ? Leave the flat bandage in place until your follow up appointment.    ? Keep the bandage dry. Wash around it carefully.    ? If the tape becomes soiled or starts to come off, reinforce it with additional paper tape.    ? Do not smoke for 3 weeks; smoking is detrimental to wound healing.    ? It is normal to have swelling and bruising around the surgical site. The bruising will fade in approximately 10-14 days. Elevate the area to reduce swelling.    ? Numbness, itchiness and sensitivity to temperature changes can occur after surgery and may take up to 18 months to normalize.      POSSIBLE COMPLICATIONS    BLEEDIN. Leave the bandage in place.  2. Use tightly rolled up gauze or a cloth to apply direct pressure over the bandage for 20   minutes.  3. Reapply pressure for an additional 20 minutes if necessary  4. Call the office or go to the nearest emergency room if pressure fails to stop the bleeding.  5. Use additional gauze and tape  to maintain pressure once the bleeding has stopped.        PAIN:    1. Post operative pain should slowly get better, never worse.  2. A severe increase in pain may indicate a problem. Call the office if this occurs.    In case of emergency phone:Dr Murrieta [phone]            Follow-ups after your visit        Who to contact     If you have questions or need follow up information about today's clinic visit or your schedule please contact [facility] directly at [phone].  Normal or non-critical lab and imaging results will be communicated to you by MyChart, letter or phone within 4 business days after the clinic has received the results. If you do not hear from us within 7 days, please contact the clinic through MyChart or phone. If you have a critical or abnormal lab result, we will notify you by phone as soon as possible.  Submit refill requests through Finderly or call your pharmacy and they will forward the refill request to us. Please allow 3 business days for your refill to be completed.          Additional Information About Your Visit        Care EveryWhere ID     This is your Care EveryWhere ID. This could be used by other organizations to access your [place] medical records  [id number]        Your Vitals Were     Pulse Last Period Pulse Oximetry             74 02/20/2015 (Exact Date) 97%          Blood Pressure from Last 3 Encounters:   05/30/18 106/73   04/18/18 115/70   04/09/18 117/84    Weight from Last 3 Encounters:   04/18/18 67.6 kg (149 lb)   03/16/18 67.9 kg (149 lb 12.8 oz)   05/22/17 68.1 kg (150 lb 3.2 oz)              We Performed the Following     69214 - EXC BENIGN SKIN LESION FACE/EARS 0.6-1.0 CM     CL FROZEN SECTION FIRST SPEC     REPAIR COMPLEX, WOUND HEAD/AXIL/GEN/HND/FT 1.1-2.5 CM        Primary Care Provider Office Phone # Fax #    Tej Lindsay [phone] [address] [facility] [address]        Equal Access to Services     SUMANTH  GAAR : Hadii aad ku nixon Manrique, waaxda luqadaha, qaybta kaheda jn, waxgreyson emily haylinette aceves jessisong velozn . So [facility] [phone].    ATENCIÓN: Si habla rachell, tiene a shelton disposición servicios gratuitos de asistencia lingüística. Llame al [phone].    We comply with applicable federal civil rights laws and [place] laws. We do not discriminate on the basis of race, color, national origin, age, disability, sex, sexual orientation, or gender identity.            Thank you!     Thank you for choosing [facility]  for your care. Our goal is always to provide you with excellent care. Hearing back from our patients is one way we can continue to improve our services. Please take a few minutes to complete the written survey that you may receive in the mail after your visit with us. Thank you!             Your Updated Medication List - Protect others around you: Learn how to safely use, store and throw away your medicines at www.disposemymeds.org.          This list is accurate as of 5/30/18  9:06 AM.  Always use your most recent med list.                   Brand Name Dispense Instructions for use Diagnosis    azaTHIOprine 50 MG tablet    IMURAN     Take 50 mg by mouth every evening.        BOTOX IJ      Inject 145 Units as directed        cholecalciferol 1000 UNIT tablet    vitamin D3     Take 1 tablet by mouth daily.        clonazePAM 0.5 MG tablet    klonoPIN    90 tablet    Take 1 tablet (0.5 mg) by mouth At Bedtime    Spasmodic torticollis       gabapentin 300 MG capsule    NEURONTIN     Take 300 mg by mouth        ketoconazole 2 % cream    NIZORAL    30 g    Apply to AA BID PRN    Dermatitis, seborrheic       levothyroxine 137 MCG tablet    SYNTHROID/LEVOTHROID    90 tablet    Take 1 tablet (137 mcg) by mouth daily    Hypothyroidism, unspecified type       methocarbamol 500 MG tablet    ROBAXIN    60 tablet    Take 2 tablets (1,000 mg) by mouth 3 times daily as needed for muscle  spasms    Cervical dystonia       ORENCIA 125 MG/ML Sosy pre-filled syringe   Generic drug:  Abatacept      Inject 125 mg Subcutaneous once a week        propranolol HCl 60 MG Tabs     1 tablet    Disregard Quntity- DENIED- Has not made follow up appt.    Essential tremor       THRIVE FOR LIFE WOMENS PO      Take 1 tablet by mouth daily

## 2018-05-30 NOTE — TELEPHONE ENCOUNTER
----- Message from Arnoldo Murrieta MD sent at 5/30/2018  9:52 AM CDT -----  L cheek epidermal cyst no further treatment

## 2018-05-30 NOTE — LETTER
5/30/2018         RE: Tequila Neumann  [address]  [place] [address]        Dear Colleague,    Thank you for referring your patient, Tequila Neumann, to the [facility]. Please see a copy of my visit note below.    Tequila Neumann is a 53 year old year old female patient here today for evaluation and managment of eic on lft cheek.  .  Patient states this has been present for a while.  Patient reports the following symptoms:  Growing and drainging.  .  Patient reports the following previous treatments none.  Patient reports the following modifying factors none.  Associated symptoms: none.  Patient has no other skin complaints today.  Remainder of the HPI, Meds, PMH, Allergies, FH, and SH was reviewed in chart.      Past Medical History:   Diagnosis Date     Abnormal Pap smear of cervix 1985    s/p LEEP     Abnormal Pap smear of cervix 03/16/2018    See problem list     Calculus of GB w/ other cystitis 1/3/2012     Cervical high risk HPV (human papillomavirus) test positive 03/16/2018    See problem list       Past Surgical History:   Procedure Laterality Date     ARTHROSCOPY KNEE RT/LT  12/8/2005    Left medial meniscal tear bilat     COLONOSCOPY  7/11/2014    Procedure: COLONOSCOPY;  Surgeon: Randell Bonilla MD;  Location: [facility]     D & C  x 2     ESOPHAGOSCOPY, GASTROSCOPY, DUODENOSCOPY (EGD), COMBINED  7/16/2012    Procedure: COMBINED ESOPHAGOSCOPY, GASTROSCOPY, DUODENOSCOPY (EGD);;  Surgeon: Lito Kwon MD;  Location: [facility]     HC ESOPH/GAS REFLUX TEST W NASAL IMPED ELECTRODE  8/23/2012    Procedure: ESOPHAGEAL IMPEDENCE FUNCTION TEST 1 HOUR OR LESS;  Surgeon: Tequila Boone MD;  Location: [facility]     LAPAROSCOPIC CHOLECYSTECTOMY  1/25/2012    Procedure:LAPAROSCOPIC CHOLECYSTECTOMY; Laparoscopic vs Open Cholecystectomy; Surgeon:RANDELL BONILLA; Location:[facility]     LASIK  2000     LEEP TX, CERVICAL  1985     TUBAL LIGATION  1998        [facility]  History   Problem Relation Age of Onset     C.A.D. Father      DIABETES Father      Hypertension Father      CEREBROVASCULAR DISEASE Father      Lipids Father      Breast Cancer Maternal Grandmother      age 80+     Alzheimer Disease Maternal Grandmother      age 70+     CANCER Mother      sarcoma, small intestinal     Neurologic Disorder Mother      tremor     CEREBROVASCULAR DISEASE Sister      may have had mild strok     Colon Polyps Sister      Cancer - colorectal No family hx of      Prostate Cancer No family hx of        Social History     Social History     Marital status: Single     Spouse name: N/A     Number of children: 2     Years of education: N/A     Occupational History     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 No     Social History Narrative        Mother was 100% Nigerian    Thien is Upper sorbian         [place]    [place], [place] area            Tremor in her mother - had head tremor and hand tremor    Maternal grandmother with alzheimers disease    Parents disease  young    1 sister healthy    2: Biological children: 2 daughthers: 28 yrs and 29 yrs old: [place] and [facility]    Lives with significant other in [place]    Sometimes drinks alcohol which helps her tremor     for [employer]; works in maple wood                   Outpatient Encounter Prescriptions as of 2018   Medication Sig Dispense Refill     Abatacept (ORENCIA) 125 MG/ML SOSY Inject 125 mg Subcutaneous once a week       azaTHIOprine  (IMURAN) 50 MG tablet Take 50 mg by mouth every evening.       cholecalciferol (VITAMIN D) 1000 UNIT tablet Take 1 tablet by mouth daily.       clonazePAM (KLONOPIN) 0.5 MG tablet Take 1 tablet (0.5 mg) by mouth At Bedtime 90 tablet 2     gabapentin (NEURONTIN) 300 MG capsule Take 300 mg by mouth       ketoconazole (NIZORAL) 2 % cream Apply to AA BID PRN 30 g 3     levothyroxine (SYNTHROID/LEVOTHROID) 137 MCG tablet Take 1 tablet (137 mcg) by mouth daily 90 tablet 3     methocarbamol (ROBAXIN) 500 MG tablet Take 2 tablets (1,000 mg) by mouth 3 times daily as needed for muscle spasms 60 tablet 3     Multiple Vitamins-Minerals (THRIVE FOR LIFE WOMENS PO) Take 1 tablet by mouth daily       OnabotulinumtoxinA (BOTOX IJ) Inject 145 Units as directed       propranolol HCl 60 MG TABS Disregard Quntity- DENIED- Has not made follow up appt. 1 tablet 0     No facility-administered encounter medications on file as of 5/30/2018.              Review Of Systems  Skin: As above  Eyes: negative  Ears/Nose/Throat: negative  Respiratory: No shortness of breath, dyspnea on exertion, cough, or hemoptysis  Cardiovascular: negative  Gastrointestinal: negative  Genitourinary: negative  Musculoskeletal: negative  Neurologic: negative  Psychiatric: negative  Hematologic/Lymphatic/Immunologic: negative  Endocrine: negative      O:   NAD, WDWN, Alert & Oriented, Mood & Affect wnl, Vitals stable   Here today alone   /73 (BP Location: Right arm, Patient Position: Chair, Cuff Size: Adult Regular)  Pulse 74  LMP 02/20/2015 (Exact Date)  SpO2 97%   General appearance normal   Vitals stable   Alert, oriented and in no acute distress     L cheek 1cm firm nodule with whitepapule          Eyes: Conjunctivae/lids:Normal     ENT: Lips, buccal mucosa, tongue: normal    MSK:Normal    Cardiovascular: peripheral edema none    Pulm: Breathing Normal    Neuro/Psych: Orientation:Normal; Mood/Affect:Normal      MICRO:   L cheek: Normal epidermis with  cyst lined by stratified squamous epithelium with epidermal keratinization with overlying connection to epidermis.    A/P:  1. L cheek eic  EXCISION OF CYST AND COMPLEX: After thorough discussion of PGACAC, consent obtained, anesthesia and prep, the margins of the cyst were identified and an elliptical incision was made encompassing the cyst. The incisions were made through the skin and down to and including the subcutaneous tissue. The cyst was removed en bloc and submitted for frozen pathologic review. The wound edges were widely undermined until adequate tissue mobility was obtained. hemostasis was achieved. The wound edges were then closed in a layered fashion, being careful not to leave any dead space. Postoperative length was 1.3 cm.   EBL minimal; complications none; wound care routine. The patient was discharged in good condition and will return in one week for wound evaluation.      Again, thank you for allowing me to participate in the care of your patient.        Sincerely,        Arnoldo Murrieta MD

## 2018-05-30 NOTE — LETTER
[facility]  [address] [address]  Phone: [phone]    May 30, 2018    Tequila Neumann                                                                                                                [address] [place] [id number]            Dear Ms. Neumann,    Your skin biopsy returned as:     L cheek:  Epidermal cyst.    No further treatment is required.    Thank you,      Arnoldo Murrieta MD/ [facility]

## 2018-06-06 ENCOUNTER — ALLIED HEALTH/NURSE VISIT (OUTPATIENT)
Dept: DERMATOLOGY | Facility: CLINIC | Age: 53
End: 2018-06-06
Payer: COMMERCIAL

## 2018-06-06 DIAGNOSIS — Z48.01 ENCOUNTER FOR CHANGE OR REMOVAL OF SURGICAL WOUND DRESSING: Primary | ICD-10-CM

## 2018-06-06 PROCEDURE — 99207 ZZC NO CHARGE NURSE ONLY: CPT

## 2018-06-06 NOTE — MR AVS SNAPSHOT
After Visit Summary   6/6/2018    Tequila Neumann    MRN: [id number]           Patient Information     Date Of Birth          1965        Visit Information        Provider Department      6/6/2018 3:00 PM [facility]        Care Instructions    WOUND CARE INSTRUCTIONS  for  ONE WEEK AFTER SURGERY          1) Leave flat bandage on your skin for one week after today s bandage change.  2) In one week when you remove the bandage, you may resume your regular skin care routine, including washing with mild soap and water, applying moisturizer, make-up and sunscreen.    3) If there are any open or bleeding areas at the incision/graft site you should begin to cover the area with a bandage daily as follows:    1) Clean and dry the area with plain tap water using a Q-tip or sterile gauze pad.  2) Apply Polysporin or Bacitracin ointment to the open area.  3) Cover the wound with a band-aid or a sterile non-stick gauze pad and micropore paper tape.         SIGNS OF INFECTION  - If you notice any of these signs of infection, call your doctor right away: expanding redness around the wound.  - Yellow or greenish-colored pus or cloudy wound drainage.    - Red streaking spreading from the wound.  - Increased swelling, tenderness, or pain around the wound.   - Fever.    Please remember that yellow and clear drainage from a wound can be normal and related to normal wound healing.  Isolated drainage from a wound without a combination of the above features does not indicate infection.       *Once the bandages are removed, the scar will be red and firm (especially in the lip/chin area). This is normal and will fade in time. It might take 6-12 months for this to happen.     *Massaging the area will help the scar soften and fade quicker. Begin to massage the area one month after the bandages have been removed. To massage apply pressure directly and firmly over the scar with the fingertips and  move in a circular motion. Massage the area for a few minutes several times a day. Continue to massage the site for several months.    *Approximately 6-8 weeks after surgery it is not uncommon to see the formation of  tender pimple-like  bump along the scar. This is normal. As the scar continues to mature and the stitches underneath the skin begin to dissolve, this might occur. Do not pick or squeeze, this will resolve on it s own. Should one break open producing a small amount of drainage, apply Polysporin or Bacitracin ointment a few times a day until the wound is completely healed.    *Numbness in the surgical area is expected. It might take 12-18 months for the feeling to return to normal. During this time sensations of itchiness, tingling and occasional sharp pains might be noted. These feelings are normal and will subside once the nerves have completely healed.         IN CASE OF EMERGENCY: Dr Murrieta [phone]     If you were seen in [place] call: [phone]    If you were seen in [place] call: [phone]              Follow-ups after your visit        Who to contact     If you have questions or need follow up information about today's clinic visit or your schedule please contact [facility] directly at [phone].  Normal or non-critical lab and imaging results will be communicated to you by MyChart, letter or phone within 4 business days after the clinic has received the results. If you do not hear from us within 7 days, please contact the clinic through MyChart or phone. If you have a critical or abnormal lab result, we will notify you by phone as soon as possible.  Submit refill requests through [employer]gift or call your pharmacy and they will forward the refill request to us. Please allow 3 business days for your refill to be completed.          Additional Information About Your Visit        Care EveryWhere ID     This is your Care EveryWhere ID. This could be used by other  organizations to access your [place] medical records  [id number]        Your Vitals Were     Last Period                   02/20/2015 (Exact Date)            Blood Pressure from Last 3 Encounters:   05/30/18 106/73   04/18/18 115/70   04/09/18 117/84    Weight from Last 3 Encounters:   04/18/18 67.6 kg (149 lb)   03/16/18 67.9 kg (149 lb 12.8 oz)   05/22/17 68.1 kg (150 lb 3.2 oz)              Today, you had the following     No orders found for display       Primary Care Provider Office Phone # Fax #    Tej Lindsay [phone] [address] [facility] [address]        Equal Access to Services     SUMANTH ATWOOD : Hosea moodyo Sojuan ramon, waaxda luqadaha, qaybta kaalmada adeegyada, byron bustamante. So [facility] [phone].    ATENCIÓN: Si habla español, tiene a shelton disposición servicios gratuitos de asistencia lingüística. Llame al [phone].    We comply with applicable federal civil rights laws and [place] laws. We do not discriminate on the basis of race, color, national origin, age, disability, sex, sexual orientation, or gender identity.            Thank you!     Thank you for choosing [facility]  for your care. Our goal is always to provide you with excellent care. Hearing back from our patients is one way we can continue to improve our services. Please take a few minutes to complete the written survey that you may receive in the mail after your visit with us. Thank you!             Your Updated Medication List - Protect others around you: Learn how to safely use, store and throw away your medicines at www.disposemymeds.org.          This list is accurate as of 6/6/18  3:05 PM.  Always use your most recent med list.                   Brand Name Dispense Instructions for use Diagnosis    azaTHIOprine 50 MG tablet    IMURAN     Take 50 mg by mouth every evening.        BOTOX IJ      Inject 145 Units as directed        cholecalciferol  1000 UNIT tablet    vitamin D3     Take 1 tablet by mouth daily.        clonazePAM 0.5 MG tablet    klonoPIN    90 tablet    Take 1 tablet (0.5 mg) by mouth At Bedtime    Spasmodic torticollis       gabapentin 300 MG capsule    NEURONTIN     Take 300 mg by mouth        ketoconazole 2 % cream    NIZORAL    30 g    Apply to AA BID PRN    Dermatitis, seborrheic       levothyroxine 137 MCG tablet    SYNTHROID/LEVOTHROID    90 tablet    Take 1 tablet (137 mcg) by mouth daily    Hypothyroidism, unspecified type       methocarbamol 500 MG tablet    ROBAXIN    60 tablet    Take 2 tablets (1,000 mg) by mouth 3 times daily as needed for muscle spasms    Cervical dystonia       ORENCIA 125 MG/ML Sosy pre-filled syringe   Generic drug:  Abatacept      Inject 125 mg Subcutaneous once a week        propranolol HCl 60 MG Tabs     1 tablet    Disregard Quntity- DENIED- Has not made follow up appt.    Essential tremor       THRIVE FOR LIFE WOMENS PO      Take 1 tablet by mouth daily

## 2018-06-06 NOTE — PATIENT INSTRUCTIONS

## 2018-06-06 NOTE — PROGRESS NOTES
Pt returned to clinic for post surgery 1 week follow up bandage change. Pt has no complaints, denies pain. Bandage removed left cheek, area cleansed with normal saline. Site is healing and wound edges approximating well. Reapplied new steri strips and paper tape.    Advised to watch for signs/sx of infection; spreading redness, drainage, odor, fever. Call or report promptly to clinic. Pt given written instructions and informed to rtc as needed. Patient verbalized understanding.     Isabel Bonner, CMA

## 2018-07-02 DIAGNOSIS — E03.9 HYPOTHYROIDISM, UNSPECIFIED TYPE: ICD-10-CM

## 2018-07-03 RX ORDER — LEVOTHYROXINE SODIUM 137 UG/1
137 TABLET ORAL DAILY
Qty: 30 TABLET | Refills: 0 | Status: SHIPPED | OUTPATIENT
Start: 2018-07-03 | End: 2018-09-17

## 2018-07-03 NOTE — TELEPHONE ENCOUNTER
Medication is being filled for 1 time refill only due to:  Patient needs labs thyroid. Patient needs to be seen because it has been more than one year since last visit.   Larissa HEAD RN

## 2018-07-03 NOTE — TELEPHONE ENCOUNTER
levothyroxine (SYNTHROID/LEVOTHROID) 137 MCG tablet  Last Written Prescription Date:  5/22/17  Last Fill Quantity: 90,  # refills: 3   Last office visit: 5/22/2017 with provider:  Dr Nick Barbosa Office Visit:

## 2018-07-31 ENCOUNTER — TELEPHONE (OUTPATIENT)
Dept: FAMILY MEDICINE | Facility: CLINIC | Age: 53
End: 2018-07-31

## 2018-07-31 DIAGNOSIS — E03.9 HYPOTHYROIDISM, UNSPECIFIED TYPE: ICD-10-CM

## 2018-07-31 NOTE — LETTER
[facility]  [address] [address]  [phone]        September 13, 2018    Tequila Neumann  [address] [address]              Dear Tequila Neumann    This is to remind you that your T4 and TSH are due.    You may call our office at [phone] to schedule an appointment.    Please disregard this notice if you have already had your labs drawn or made an appointment.        Sincerely,        Tej Lindsay MD

## 2018-07-31 NOTE — TELEPHONE ENCOUNTER
"Requested Prescriptions   Pending Prescriptions Disp Refills     levothyroxine (SYNTHROID/LEVOTHROID) 137 MCG tablet [Pharmacy Med Name: LEVOTHYROXINE 137 MCG TABLET] 30 tablet 0     Sig: TAKE 1 TABLET (137 MCG) BY MOUTH DAILY NEEDS OFFICE VISIT & LABS FOR FURTHER REFILLS    Thyroid Protocol Failed    7/31/2018  9:21 AM       Failed - Normal TSH on file in past 12 months    Recent Labs   Lab Test  05/22/17   1713   TSH  12.93*             Passed - Patient is 12 years or older       Passed - Recent (12 mo) or future (30 days) visit within the authorizing provider's specialty    Patient had office visit in the last 12 months or has a visit in the next 30 days with authorizing provider or within the authorizing provider's specialty.  See \"Patient Info\" tab in inbasket, or \"Choose Columns\" in Meds & Orders section of the refill encounter.           Passed - No active pregnancy on record    If patient is pregnant or has had a positive pregnancy test, please check TSH.         Passed - No positive pregnancy test in past 12 months    If patient is pregnant or has had a positive pregnancy test, please check TSH.        Routing refill request to provider for review/approval because:  Jelly given x1 and patient did not follow up, please advise          "

## 2018-08-01 RX ORDER — LEVOTHYROXINE SODIUM 137 UG/1
TABLET ORAL
Qty: 30 TABLET | Refills: 0 | OUTPATIENT
Start: 2018-08-01

## 2018-08-01 NOTE — TELEPHONE ENCOUNTER
Denied.  I have not seen this patient in over 4 years.  She needs to make an appointment with a provider and have labs done.  Sincerely,  Tej Lindsay MD

## 2018-08-01 NOTE — TELEPHONE ENCOUNTER
OV and lab needed. Denied by Dr. Lindsay. Left message for patient to return call to clinic.   Marsha Leija RN

## 2018-08-01 NOTE — TELEPHONE ENCOUNTER
Patient notified of Dr Lindsay's recommendation, she was going to check with her other specialists to see if they would monitor her tsh. Scheduled OV for 08/15 at 340p--to see Dr Lindsay. Patient reports she has enough meds until OV.         Shoshana PELAEZ  Station

## 2018-08-10 ENCOUNTER — TRANSFERRED RECORDS (OUTPATIENT)
Dept: HEALTH INFORMATION MANAGEMENT | Facility: CLINIC | Age: 53
End: 2018-08-10

## 2018-09-17 ENCOUNTER — OFFICE VISIT (OUTPATIENT)
Dept: FAMILY MEDICINE | Facility: CLINIC | Age: 53
End: 2018-09-17
Payer: COMMERCIAL

## 2018-09-17 VITALS
BODY MASS INDEX: 25.26 KG/M2 | HEIGHT: 65 IN | HEART RATE: 68 BPM | TEMPERATURE: 97 F | RESPIRATION RATE: 18 BRPM | DIASTOLIC BLOOD PRESSURE: 66 MMHG | OXYGEN SATURATION: 97 % | WEIGHT: 151.6 LBS | SYSTOLIC BLOOD PRESSURE: 102 MMHG

## 2018-09-17 DIAGNOSIS — Z71.6 ENCOUNTER FOR SMOKING CESSATION COUNSELING: ICD-10-CM

## 2018-09-17 DIAGNOSIS — E03.9 HYPOTHYROIDISM, UNSPECIFIED TYPE: Primary | ICD-10-CM

## 2018-09-17 LAB
ANION GAP SERPL CALCULATED.3IONS-SCNC: 4 MMOL/L (ref 3–14)
BUN SERPL-MCNC: 16 MG/DL (ref 7–30)
CALCIUM SERPL-MCNC: 8.3 MG/DL (ref 8.5–10.1)
CHLORIDE SERPL-SCNC: 105 MMOL/L (ref 94–109)
CHOLEST SERPL-MCNC: 244 MG/DL
CO2 SERPL-SCNC: 29 MMOL/L (ref 20–32)
CREAT SERPL-MCNC: 0.8 MG/DL (ref 0.52–1.04)
GFR SERPL CREATININE-BSD FRML MDRD: 74 ML/MIN/1.7M2
GLUCOSE SERPL-MCNC: 88 MG/DL (ref 70–99)
HDLC SERPL-MCNC: 53 MG/DL
LDLC SERPL CALC-MCNC: 157 MG/DL
NONHDLC SERPL-MCNC: 191 MG/DL
POTASSIUM SERPL-SCNC: 3.8 MMOL/L (ref 3.4–5.3)
SODIUM SERPL-SCNC: 138 MMOL/L (ref 133–144)
T4 FREE SERPL-MCNC: 1.03 NG/DL (ref 0.76–1.46)
TRIGL SERPL-MCNC: 172 MG/DL
TSH SERPL DL<=0.005 MIU/L-ACNC: 12.66 MU/L (ref 0.4–4)

## 2018-09-17 PROCEDURE — 80061 LIPID PANEL: CPT | Performed by: NURSE PRACTITIONER

## 2018-09-17 PROCEDURE — 84443 ASSAY THYROID STIM HORMONE: CPT | Performed by: NURSE PRACTITIONER

## 2018-09-17 PROCEDURE — 80048 BASIC METABOLIC PNL TOTAL CA: CPT | Performed by: NURSE PRACTITIONER

## 2018-09-17 PROCEDURE — 99214 OFFICE O/P EST MOD 30 MIN: CPT | Performed by: NURSE PRACTITIONER

## 2018-09-17 PROCEDURE — 84439 ASSAY OF FREE THYROXINE: CPT | Performed by: NURSE PRACTITIONER

## 2018-09-17 PROCEDURE — 36415 COLL VENOUS BLD VENIPUNCTURE: CPT | Performed by: NURSE PRACTITIONER

## 2018-09-17 RX ORDER — LEVOTHYROXINE SODIUM 150 UG/1
150 TABLET ORAL DAILY
Qty: 30 TABLET | Refills: 2 | Status: SHIPPED | OUTPATIENT
Start: 2018-09-17 | End: 2018-11-13

## 2018-09-17 RX ORDER — VARENICLINE TARTRATE 1 MG/1
1 TABLET, FILM COATED ORAL 2 TIMES DAILY
Qty: 56 TABLET | Refills: 2 | Status: SHIPPED | OUTPATIENT
Start: 2018-09-17 | End: 2018-12-19

## 2018-09-17 RX ORDER — NICOTINE 21 MG/24HR
1 PATCH, TRANSDERMAL 24 HOURS TRANSDERMAL EVERY 24 HOURS
Qty: 30 PATCH | Refills: 3 | Status: SHIPPED | OUTPATIENT
Start: 2018-09-17 | End: 2018-12-19

## 2018-09-17 NOTE — PROGRESS NOTES
"  SUBJECTIVE:   Tequila Neumann is a 53 year old female who presents to clinic today for the following health issues: hypothyroidism follow up. Also would like to quit smoking, wondering about Chantix. Tried Wellbutrin in the past, but developed side effects: hand tremors.     Problem list and histories reviewed & adjusted, as indicated.  Additional history: as documented    Labs reviewed in EPIC    Reviewed and updated as needed this visit by clinical staff  Allergies       Reviewed and updated as needed this visit by Provider         ROS:  Constitutional, HEENT, cardiovascular, pulmonary, gi and gu systems are negative, except as otherwise noted.    OBJECTIVE:     /66  Pulse 68  Temp 97  F (36.1  C) (Tympanic)  Resp 18  Ht 5' 5.25\" (1.657 m)  Wt 151 lb 9.6 oz (68.8 kg)  LMP 02/20/2015 (Exact Date)  SpO2 97%  BMI 25.03 kg/m2  Body mass index is 25.03 kg/(m^2).  GENERAL: healthy, alert and no distress  CV: regular rate and rhythm, normal S1 S2, no S3 or S4, no murmur, click or rub, no peripheral edema and peripheral pulses strong  PSYCH: mentation appears normal, affect normal/bright    Diagnostic Test Results:  No results found for this or any previous visit (from the past 24 hour(s)).    Pending labs    ASSESSMENT/PLAN:     1. Hypothyroidism, unspecified type  -clinically euthyroid  - TSH-elevated  - T4, free-wnl  -increased Synthroid to 150 mcg daily, recheck thyroid labs in 2 months   - Lipid panel reflex to direct LDL Fasting  - Basic metabolic panel    2. Encounter for smoking cessation counseling  - varenicline (CHANTIX STARTING MONTH MARCO) 0.5 MG X 11 & 1 MG X 42 tablet; Take 0.5 mg tab daily for 3 days, then 0.5 mg tab twice daily for 4 days, then 1 mg twice daily.  Dispense: 53 tablet; Refill: 0  - varenicline (CHANTIX) 1 MG tablet; Take 1 tablet (1 mg) by mouth 2 times daily  Dispense: 56 tablet; Refill: 2  - nicotine (NICODERM CQ) 14 MG/24HR 24 hr patch; Place 1 patch onto the skin every 24 " hours  Dispense: 30 patch; Refill: 3    See Patient Instructions    RADHA Ernandez [facility]

## 2018-09-17 NOTE — LETTER
"[facility]  [address] [place] [address]  Phone: [phone]    September 17, 2018        Tequila Neumann  [address] [place] [address]          Dear Tequila,      1. Renal function, electrolytes normal   2. TSH level elevated, recommend to increase Synthroid to 150 mcg daily and recheck levels again in 2 months, order in.   3. LDL, or \"bad cholesterol\" slightly elevated, recommend low fat, or Mediterranean diet, exercise.  Recheck lipid panel in 1 year.    Component      Latest Ref Rng & Units 9/17/2018   Sodium      133 - 144 mmol/L 138   Potassium      3.4 - 5.3 mmol/L 3.8   Chloride      94 - 109 mmol/L 105   Carbon Dioxide      20 - 32 mmol/L 29   Anion Gap      3 - 14 mmol/L 4   Glucose      70 - 99 mg/dL 88   Urea Nitrogen      7 - 30 mg/dL 16   Creatinine      0.52 - 1.04 mg/dL 0.80   GFR Estimate      >60 mL/min/1.7m2 74   GFR Estimate If Black      >60 mL/min/1.7m2 >90   Calcium      8.5 - 10.1 mg/dL 8.3 (L)   Cholesterol      <200 mg/dL 244 (H)   Triglycerides      <150 mg/dL 172 (H)   HDL Cholesterol      >49 mg/dL 53   LDL Cholesterol Calculated      <100 mg/dL 157 (H)   Non HDL Cholesterol      <130 mg/dL 191 (H)   TSH      0.40 - 4.00 mU/L 12.66 (H)   T4 Free      0.76 - 1.46 ng/dL 1.03       Sincerely,        Emma Beltran, ANTOHNY  / lopez cma        "

## 2018-09-17 NOTE — MR AVS SNAPSHOT
"              After Visit Summary   9/17/2018    Tequila Neumann    MRN: [id number]           Patient Information     Date Of Birth          1965        Visit Information        Provider Department      9/17/2018 6:40 AM Emma Beltran APRN CNP [facility]        Today's Diagnoses     Hypothyroidism, unspecified type    -  1    Encounter for smoking cessation counseling          Care Instructions    Thyroid  Lipid panel  Glucose, renal function    Chantix start at 0.5 mg daily, follow directions on the package               Follow-ups after your visit        Who to contact     If you have questions or need follow up information about today's clinic visit or your schedule please contact [facility] directly at [phone].  Normal or non-critical lab and imaging results will be communicated to you by [employer], letter or phone within 4 business days after the clinic has received the results. If you do not hear from us within 7 days, please contact the clinic through [employer] or phone. If you have a critical or abnormal lab result, we will notify you by phone as soon as possible.  Submit refill requests through [employer] or call your pharmacy and they will forward the refill request to us. Please allow 3 business days for your refill to be completed.          Additional Information About Your Visit        MyChart Information     [employer] lets you send messages to your doctor, view your test results, renew your prescriptions, schedule appointments and more. To sign up, go to [email or web address] . Click on \"Log in\" on the left side of the screen, which will take you to the Welcome page. Then click on \"Sign up Now\" on the right side of the page.     You will be asked to enter the access code listed below, as well as some personal information. Please follow the directions to create your username and password.     Your access code is: [email or web address]  Expires: 12/16/2018  7:09 AM   " "  Your access code will  in 90 days. If you need help or a new code, please call your [place] clinic or [phone].        Care EveryWhere ID     This is your Care EveryWhere ID. This could be used by other organizations to access your [place] medical records  [id number]        Your Vitals Were     Pulse Temperature Height Last Period Pulse Oximetry BMI (Body Mass Index)    68 97  F (36.1  C) (Tympanic) 5' 5.25\" (1.657 m) 2015 (Exact Date) 97% 25.03 kg/m2       Blood Pressure from Last 3 Encounters:   18 102/66   18 106/73   18 115/70    Weight from Last 3 Encounters:   18 151 lb 9.6 oz (68.8 kg)   18 149 lb (67.6 kg)   18 149 lb 12.8 oz (67.9 kg)              We Performed the Following     Basic metabolic panel     Lipid panel reflex to direct LDL Fasting     T4, free     TSH          Today's Medication Changes          These changes are accurate as of 18  7:09 AM.  If you have any questions, ask your nurse or doctor.               Start taking these medicines.        Dose/Directions    nicotine 14 MG/24HR 24 hr patch   Commonly known as:  NICODERM CQ   Used for:  Encounter for smoking cessation counseling        Dose:  1 patch   Place 1 patch onto the skin every 24 hours   Quantity:  30 patch   Refills:  3       * varenicline 0.5 MG X 11 & 1 MG X 42 tablet   Commonly known as:  CHANTIX STARTING MONTH MARCO   Used for:  Encounter for smoking cessation counseling        Take 0.5 mg tab daily for 3 days, then 0.5 mg tab twice daily for 4 days, then 1 mg twice daily.   Quantity:  53 tablet   Refills:  0       * varenicline 1 MG tablet   Commonly known as:  CHANTIX   Used for:  Encounter for smoking cessation counseling        Dose:  1 mg   Take 1 tablet (1 mg) by mouth 2 times daily   Quantity:  56 tablet   Refills:  2       * Notice:  This list has 2 medication(s) that are the same as other medications prescribed for you. Read the directions carefully, and ask " your doctor or other care provider to review them with you.         Where to get your medicines      These medications were sent to [facility]/pharmacy #[id number] - [place], [place] - [address] [place] [address] [id number], [place] [address]     Phone:  [phone]     nicotine 14 MG/24HR 24 hr patch    varenicline 0.5 MG X 11 & 1 MG X 42 tablet    varenicline 1 MG tablet                Primary Care Provider Office Phone # Fax #    Tej Lindsay [phone] [address] [facility] [address]        Equal Access to Services     [facility] : Hadii aad ku hadasho Soomaali, waaxda luqadaha, qaybta kaalmada adeegyada, byron bowden . So [facility] [phone].    ATENCIÓN: Si habla español, tiene a shelton disposición servicios gratuitos de asistencia lingüística. Ll[facility] [phone].    We comply with applicable federal civil rights laws and [place] laws. We do not discriminate on the basis of race, color, national origin, age, disability, sex, sexual orientation, or gender identity.            Thank you!     Thank you for choosing [facility]  for your care. Our goal is always to provide you with excellent care. Hearing back from our patients is one way we can continue to improve our services. Please take a few minutes to complete the written survey that you may receive in the mail after your visit with us. Thank you!             Your Updated Medication List - Protect others around you: Learn how to safely use, store and throw away your medicines at www.disposemymeds.org.          This list is accurate as of 9/17/18  7:09 AM.  Always use your most recent med list.                   Brand Name Dispense Instructions for use Diagnosis    azaTHIOprine 50 MG tablet    IMURAN     Take 50 mg by mouth every evening.        BOTOX IJ      Inject 145 Units as directed        cholecalciferol 1000 UNIT tablet    vitamin D3     Take 1 tablet by mouth daily.         clonazePAM 0.5 MG tablet    klonoPIN    90 tablet    Take 1 tablet (0.5 mg) by mouth At Bedtime    Spasmodic torticollis       gabapentin 300 MG capsule    NEURONTIN     Take 300 mg by mouth        ketoconazole 2 % cream    NIZORAL    30 g    Apply to AA BID PRN    Dermatitis, seborrheic       levothyroxine 137 MCG tablet    SYNTHROID/LEVOTHROID    30 tablet    Take 1 tablet (137 mcg) by mouth daily NEEDS OFFICE VISIT & LABS FOR FURTHER REFILLS    Hypothyroidism, unspecified type       methocarbamol 500 MG tablet    ROBAXIN    60 tablet    Take 2 tablets (1,000 mg) by mouth 3 times daily as needed for muscle spasms    Cervical dystonia       nicotine 14 MG/24HR 24 hr patch    NICODERM CQ    30 patch    Place 1 patch onto the skin every 24 hours    Encounter for smoking cessation counseling       ORENCIA 125 MG/ML Sosy pre-filled syringe   Generic drug:  Abatacept      Inject 125 mg Subcutaneous once a week        propranolol HCl 60 MG Tabs     1 tablet    Disregard Quntity- DENIED- Has not made follow up appt.    Essential tremor       THRIVE FOR LIFE WOMENS PO      Take 1 tablet by mouth daily        * varenicline 0.5 MG X 11 & 1 MG X 42 tablet    CHANTIX STARTING MONTH MARCO    53 tablet    Take 0.5 mg tab daily for 3 days, then 0.5 mg tab twice daily for 4 days, then 1 mg twice daily.    Encounter for smoking cessation counseling       * varenicline 1 MG tablet    CHANTIX    56 tablet    Take 1 tablet (1 mg) by mouth 2 times daily    Encounter for smoking cessation counseling       * Notice:  This list has 2 medication(s) that are the same as other medications prescribed for you. Read the directions carefully, and ask your doctor or other care provider to review them with you.

## 2018-09-17 NOTE — PATIENT INSTRUCTIONS
Thyroid  Lipid panel  Glucose, renal function    Chantix start at 0.5 mg daily, follow directions on the package

## 2018-09-27 DIAGNOSIS — G25.0 ESSENTIAL TREMOR: ICD-10-CM

## 2018-09-27 RX ORDER — PROPRANOLOL HYDROCHLORIDE 60 MG/1
TABLET ORAL
Qty: 1 TABLET | Refills: 0 | Status: SHIPPED | OUTPATIENT
Start: 2018-09-27 | End: 2019-01-10

## 2018-09-27 NOTE — TELEPHONE ENCOUNTER
Reason for Call:  Medication or medication refill:    Do you use a [facility]?  Name of the pharmacy and phone number for the current request:  [facility] (Ph: [phone]; Fax: [phone])    Name of the medication requested: Propranolol    Other request:   LAST REFILL: 10/09/2017  LOV: 08/01/2016    Can we leave a detailed message on this number? Not Applicable    Phone number patient can be reached at: Home number on file [phone] (home)    Best Time: NA    Call taken on 9/27/2018 at 10:15 AM by Denise Behrendt

## 2018-10-01 ENCOUNTER — TRANSFERRED RECORDS (OUTPATIENT)
Dept: HEALTH INFORMATION MANAGEMENT | Facility: CLINIC | Age: 53
End: 2018-10-01

## 2018-10-12 ENCOUNTER — OFFICE VISIT (OUTPATIENT)
Dept: FAMILY MEDICINE | Facility: CLINIC | Age: 53
End: 2018-10-12
Payer: COMMERCIAL

## 2018-10-12 ENCOUNTER — RADIANT APPOINTMENT (OUTPATIENT)
Dept: GENERAL RADIOLOGY | Facility: CLINIC | Age: 53
End: 2018-10-12
Attending: NURSE PRACTITIONER
Payer: COMMERCIAL

## 2018-10-12 VITALS
OXYGEN SATURATION: 95 % | BODY MASS INDEX: 25.11 KG/M2 | TEMPERATURE: 97.7 F | HEIGHT: 65 IN | HEART RATE: 77 BPM | DIASTOLIC BLOOD PRESSURE: 68 MMHG | SYSTOLIC BLOOD PRESSURE: 104 MMHG | WEIGHT: 150.7 LBS

## 2018-10-12 DIAGNOSIS — R07.1 CHEST PAIN ON BREATHING: ICD-10-CM

## 2018-10-12 DIAGNOSIS — E03.9 HYPOTHYROIDISM, UNSPECIFIED TYPE: Primary | ICD-10-CM

## 2018-10-12 DIAGNOSIS — R30.0 DYSURIA: ICD-10-CM

## 2018-10-12 LAB
ALBUMIN UR-MCNC: NEGATIVE MG/DL
APPEARANCE UR: CLEAR
BILIRUB UR QL STRIP: NEGATIVE
COLOR UR AUTO: YELLOW
GLUCOSE UR STRIP-MCNC: NEGATIVE MG/DL
HGB UR QL STRIP: NEGATIVE
KETONES UR STRIP-MCNC: NEGATIVE MG/DL
LEUKOCYTE ESTERASE UR QL STRIP: ABNORMAL
NITRATE UR QL: NEGATIVE
NON-SQ EPI CELLS #/AREA URNS LPF: ABNORMAL /LPF
PH UR STRIP: 6 PH (ref 5–7)
RBC #/AREA URNS AUTO: ABNORMAL /HPF
SOURCE: ABNORMAL
SP GR UR STRIP: <=1.005 (ref 1–1.03)
T4 FREE SERPL-MCNC: 1.16 NG/DL (ref 0.76–1.46)
TSH SERPL DL<=0.005 MIU/L-ACNC: 6.81 MU/L (ref 0.4–4)
UROBILINOGEN UR STRIP-ACNC: 0.2 EU/DL (ref 0.2–1)
WBC #/AREA URNS AUTO: ABNORMAL /HPF

## 2018-10-12 PROCEDURE — 81001 URINALYSIS AUTO W/SCOPE: CPT | Performed by: NURSE PRACTITIONER

## 2018-10-12 PROCEDURE — 84443 ASSAY THYROID STIM HORMONE: CPT | Performed by: NURSE PRACTITIONER

## 2018-10-12 PROCEDURE — 99214 OFFICE O/P EST MOD 30 MIN: CPT | Performed by: NURSE PRACTITIONER

## 2018-10-12 PROCEDURE — 84439 ASSAY OF FREE THYROXINE: CPT | Performed by: NURSE PRACTITIONER

## 2018-10-12 PROCEDURE — 36415 COLL VENOUS BLD VENIPUNCTURE: CPT | Performed by: NURSE PRACTITIONER

## 2018-10-12 PROCEDURE — 71046 X-RAY EXAM CHEST 2 VIEWS: CPT | Mod: FY

## 2018-10-12 RX ORDER — AMOXICILLIN 500 MG/1
500 CAPSULE ORAL 3 TIMES DAILY
Qty: 9 CAPSULE | Refills: 0 | Status: SHIPPED | OUTPATIENT
Start: 2018-10-12 | End: 2018-10-15

## 2018-10-12 RX ORDER — CELECOXIB 100 MG/1
100 CAPSULE ORAL 2 TIMES DAILY PRN
Qty: 60 CAPSULE | Refills: 2 | Status: SHIPPED | OUTPATIENT
Start: 2018-10-12 | End: 2019-01-18

## 2018-10-12 NOTE — MR AVS SNAPSHOT
"              After Visit Summary   10/12/2018    Tequila Neumann    MRN: [id number]           Patient Information     Date Of Birth          1965        Visit Information        Provider Department      10/12/2018 9:00 AM Emma Beltran APRN CNP [facility]        Today's Diagnoses     Hypothyroidism, unspecified type    -  1    Chest pain on breathing          Care Instructions    Chest Xray  Thyroid levels    Try Celebrex 100 mg twice daily as needed for pain              Follow-ups after your visit        Future tests that were ordered for you today     Open Future Orders        Priority Expected Expires Ordered    XR Chest 2 Views Routine 10/12/2018 10/12/2019 10/12/2018            Who to contact     If you have questions or need follow up information about today's clinic visit or your schedule please contact [facility] directly at [phone].  Normal or non-critical lab and imaging results will be communicated to you by [employer], letter or phone within 4 business days after the clinic has received the results. If you do not hear from us within 7 days, please contact the clinic through [employer] or phone. If you have a critical or abnormal lab result, we will notify you by phone as soon as possible.  Submit refill requests through [employer] or call your pharmacy and they will forward the refill request to us. Please allow 3 business days for your refill to be completed.          Additional Information About Your Visit        MyChart Information     [employer] lets you send messages to your doctor, view your test results, renew your prescriptions, schedule appointments and more. To sign up, go to [email or web address] . Click on \"Log in\" on the left side of the screen, which will take you to the Welcome page. Then click on \"Sign up Now\" on the right side of the page.     You will be asked to enter the access code listed below, as well as some personal information. Please " "follow the directions to create your username and password.     Your access code is: [email or web address]  Expires: 2018  7:09 AM     Your access code will  in 90 days. If you need help or a new code, please call your [place] clinic or [phone].        Care EveryWhere ID     This is your Care EveryWhere ID. This could be used by other organizations to access your [place] medical records  [id number]        Your Vitals Were     Pulse Temperature Height Last Period Pulse Oximetry BMI (Body Mass Index)    77 97.7  F (36.5  C) (Tympanic) 5' 5.25\" (1.657 m) 2015 (Exact Date) 95% 24.89 kg/m2       Blood Pressure from Last 3 Encounters:   10/12/18 104/68   18 102/66   18 106/73    Weight from Last 3 Encounters:   10/12/18 150 lb 11.2 oz (68.4 kg)   18 151 lb 9.6 oz (68.8 kg)   18 149 lb (67.6 kg)              We Performed the Following     TSH with free T4 reflex          Today's Medication Changes          These changes are accurate as of 10/12/18  9:37 AM.  If you have any questions, ask your nurse or doctor.               Start taking these medicines.        Dose/Directions    celecoxib 100 MG capsule   Commonly known as:  celeBREX   Used for:  Chest pain on breathing   Started by:  Emma Beltran APRN CNP        Dose:  100 mg   Take 1 capsule (100 mg) by mouth 2 times daily as needed for moderate pain   Quantity:  60 capsule   Refills:  2            Where to get your medicines      These medications were sent to [facility] [place], [place] - [address] [facility]  [address] [facility] [address]     Phone:  [phone]     celecoxib 100 MG capsule                Primary Care Provider Office Phone # Fax #    Tej Lindsay [phone] [address] [facility] [address] [facility] [address]        Equal Access to Services     SUMANTH ATWOOD AH: Hosea Manrique, argentina dunbar, qaybta kaalmada byron ragsdale" la'aan ah. So [facility] [phone].    ATENCIÓN: Si habla rachell, tiene a shelton disposición servicios gratuitos de asistencia lingüística. Johana buck [phone].    We comply with applicable federal civil rights laws and [place] laws. We do not discriminate on the basis of race, color, national origin, age, disability, sex, sexual orientation, or gender identity.            Thank you!     Thank you for choosing [facility]  for your care. Our goal is always to provide you with excellent care. Hearing back from our patients is one way we can continue to improve our services. Please take a few minutes to complete the written survey that you may receive in the mail after your visit with us. Thank you!             Your Updated Medication List - Protect others around you: Learn how to safely use, store and throw away your medicines at www.disposemymeds.org.          This list is accurate as of 10/12/18  9:37 AM.  Always use your most recent med list.                   Brand Name Dispense Instructions for use Diagnosis    azaTHIOprine 50 MG tablet    IMURAN     Take 50 mg by mouth every evening.        BOTOX IJ      Inject 145 Units as directed        celecoxib 100 MG capsule    celeBREX    60 capsule    Take 1 capsule (100 mg) by mouth 2 times daily as needed for moderate pain    Chest pain on breathing       cholecalciferol 1000 UNIT tablet    vitamin D3     Take 1 tablet by mouth daily.        clonazePAM 0.5 MG tablet    klonoPIN    90 tablet    Take 1 tablet (0.5 mg) by mouth At Bedtime    Spasmodic torticollis       gabapentin 300 MG capsule    NEURONTIN     Take 300 mg by mouth        ketoconazole 2 % cream    NIZORAL    30 g    Apply to AA BID PRN    Dermatitis, seborrheic       levothyroxine 150 MCG tablet    SYNTHROID/LEVOTHROID    30 tablet    Take 1 tablet (150 mcg) by mouth daily recheck thyroid levels in 2 months    Hypothyroidism, unspecified type       methocarbamol 500 MG tablet    ROBAXIN    60  tablet    Take 2 tablets (1,000 mg) by mouth 3 times daily as needed for muscle spasms    Cervical dystonia       nicotine 14 MG/24HR 24 hr patch    NICODERM CQ    30 patch    Place 1 patch onto the skin every 24 hours    Encounter for smoking cessation counseling       ORENCIA 125 MG/ML Sosy pre-filled syringe   Generic drug:  Abatacept      Inject 125 mg Subcutaneous once a week        propranolol HCl 60 MG Tabs     1 tablet    Disregard Quntity- DENIED- Has not made follow up appt.    Essential tremor       THRIVE FOR LIFE WOMENS PO      Take 1 tablet by mouth daily        * varenicline 0.5 MG X 11 & 1 MG X 42 tablet    CHANTIX STARTING MONTH MARCO    53 tablet    Take 0.5 mg tab daily for 3 days, then 0.5 mg tab twice daily for 4 days, then 1 mg twice daily.    Encounter for smoking cessation counseling       * varenicline 1 MG tablet    CHANTIX    56 tablet    Take 1 tablet (1 mg) by mouth 2 times daily    Encounter for smoking cessation counseling       * Notice:  This list has 2 medication(s) that are the same as other medications prescribed for you. Read the directions carefully, and ask your doctor or other care provider to review them with you.

## 2018-10-12 NOTE — PROGRESS NOTES
"  SUBJECTIVE:   Tequila Neumann is a 53 year old female who presents to clinic today for the following health issues:    Chief Complaint   Patient presents with     Chest Pain     having pain in her ribs for the past 6 months, having chest pain off and on for awhile      Urinary Problem     Since her last yearly exam, having pelvic pain, comes on sudden, decrease in urinary flow      CHEST PAIN     Onset: on and off for awhile     Description:   Location:  left side  Character: achey, sometimes wakes up suddenly in the middle of night, sometimes sharp pain, worse when lifting  Radiation: none   Duration: few minutes     Intensity: moderate    Progression of Symptoms:  worsening    Accompanying Signs & Symptoms:  Shortness of breath: no, has been coughing up clear/ brown phlegm   Sweating: YES  Nausea/vomiting: no   Lightheadedness: no   Palpitations: no  Fever/Chills: YES, chills   Cough: yes   Heartburn: no     History:   Family history of heart disease YES- father     Precipitating factors:   Worse with exertion: YES, 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  Additional history: as documented    Labs reviewed in EPIC    Reviewed and updated as needed this visit by clinical staff  Tobacco  Allergies  Meds  Med Hx  Surg Hx  Fam Hx  Soc Hx      Reviewed and updated as needed this visit by Provider         ROS:  Constitutional, HEENT, cardiovascular, pulmonary, gi and gu systems are negative, except as otherwise noted.    OBJECTIVE:     /68  Pulse 77  Temp 97.7  F (36.5  C) (Tympanic)  Ht 5' 5.25\" (1.657 m)  Wt 150 lb 11.2 oz (68.4 kg)  LMP 02/20/2015 (Exact Date)  SpO2 95%  BMI 24.89 kg/m2  Body mass index is 24.89 " kg/(m^2).  GENERAL: healthy, alert and no distress  RESP: lungs clear to auscultation - no rales, rhonchi or wheezes  CV: regular rate and rhythm, normal S1 S2, no S3 or S4, no murmur, click or rub, no peripheral edema and peripheral pulses strong  MS: no gross musculoskeletal defects noted, no edema, left side chest wall and sternum tenderness  PSYCH: mentation appears normal, affect normal/bright    Diagnostic Test Results:  Results for orders placed or performed in visit on 10/12/18 (from the past 24 hour(s))   UA with Microscopic reflex to Culture   Result Value Ref Range    Color Urine Yellow     Appearance Urine Clear     Glucose Urine Negative NEG^Negative mg/dL    Bilirubin Urine Negative NEG^Negative    Ketones Urine Negative NEG^Negative mg/dL    Specific Gravity Urine <=1.005 1.003 - 1.035    pH Urine 6.0 5.0 - 7.0 pH    Protein Albumin Urine Negative NEG^Negative mg/dL    Urobilinogen Urine 0.2 0.2 - 1.0 EU/dL    Nitrite Urine Negative NEG^Negative    Blood Urine Negative NEG^Negative    Leukocyte Esterase Urine Small (A) NEG^Negative    Source Midstream Urine     WBC Urine 5-10 (A) OTO5^0 - 5 /HPF    RBC Urine O - 2 OTO2^O - 2 /HPF    Squamous Epithelial /LPF Urine Few FEW^Few /LPF   TSH with free T4 reflex   Result Value Ref Range    TSH 6.81 (H) 0.40 - 4.00 mU/L       ASSESSMENT/PLAN:     1. Hypothyroidism, unspecified type  -clinically euthyroid  -thyroid levels improving on Levothyroxine 150 mcg daily, will continue same dose   - TSH with free T4 reflex  - T4 free    2. Chest pain on breathing  -chronic smoker, for about 15 years, highly recommended her to quit  -chest Xray clear  -chest pain due to costochondritis  -will try Celebrex twice daily as needed   - XR Chest 2 Views; Future  - celecoxib (CELEBREX) 100 MG capsule; Take 1 capsule (100 mg) by mouth 2 times daily as needed for moderate pain  Dispense: 60 capsule; Refill: 2    3. Dysuria  -due to mild UTI  -started Amoxil 500 mg 3 times daily  for 3 days  -drink more fluids   - UA with Microscopic reflex to Culture  - amoxicillin (AMOXIL) 500 MG capsule; Take 1 capsule (500 mg) by mouth 3 times daily for 3 days  Dispense: 9 capsule; Refill: 0    See Patient Instructions    RADHA Ernandez [facility]

## 2018-10-16 ENCOUNTER — TELEPHONE (OUTPATIENT)
Dept: FAMILY MEDICINE | Facility: CLINIC | Age: 53
End: 2018-10-16

## 2018-10-16 DIAGNOSIS — M41.24 OTHER IDIOPATHIC SCOLIOSIS, THORACIC REGION: Primary | ICD-10-CM

## 2018-10-16 NOTE — TELEPHONE ENCOUNTER
Reason for Call:  Other scoliosis    Detailed comments: pt calling to get her results and wanted us to ask you a question. She said she thinks her scoliosis is getting worse and is wondering if that could be contributing to her symptoms.     Phone Number Patient can be reached at: Home number on file [phone] (home)    Best Time: any    Can we leave a detailed message on this number? YES    Call taken on 10/16/2018 at 10:19 AM by Socorro Rice

## 2018-10-17 NOTE — TELEPHONE ENCOUNTER
Patient is called and told of the 2 referral and the number to call if needed. Elina ZARAGOZA RN

## 2018-10-17 NOTE — TELEPHONE ENCOUNTER
Emma,    Patient is called and she is wanting to see someone for her scoliosis now.  I have placed a ortho spine specialist. Elina ZARAGOZA RN

## 2018-11-06 ENCOUNTER — TRANSFERRED RECORDS (OUTPATIENT)
Dept: HEALTH INFORMATION MANAGEMENT | Facility: CLINIC | Age: 53
End: 2018-11-06

## 2018-11-13 ENCOUNTER — TELEPHONE (OUTPATIENT)
Dept: FAMILY MEDICINE | Facility: CLINIC | Age: 53
End: 2018-11-13

## 2018-11-13 DIAGNOSIS — E03.9 HYPOTHYROIDISM, UNSPECIFIED TYPE: ICD-10-CM

## 2018-11-13 NOTE — TELEPHONE ENCOUNTER
I left a message for the patient to return my call. Need to verify pharmacy, one selected cannot E-Prescribe Rx's  Prescription can be approved per [facility] Refill Protocol.  Reviewed 10/12/18 result note:  Notes Recorded by Emma Beltran APRN CNP on 10/12/2018 at 12:45 PM  Thyroid function improved, T 4 level normal, continue Levothyroxine 150 mcg daily     RADHA Ernandez CNP, RN

## 2018-11-13 NOTE — TELEPHONE ENCOUNTER
"Requested Prescriptions   Pending Prescriptions Disp Refills     levothyroxine (SYNTHROID/LEVOTHROID) 150 MCG tablet 30 tablet 2    Last Written Prescription Date:  9/17/18  Last Fill Quantity: 30,  # refills: 2   Last office visit: 10/12/2018 with prescribing provider:  Ruby   Future Office Visit:     Sig: Take 1 tablet (150 mcg) by mouth daily recheck thyroid levels in 2 months    Thyroid Protocol Failed    11/13/2018 12:56 PM       Failed - Normal TSH on file in past 12 months    Recent Labs   Lab Test  10/12/18   0949   TSH  6.81*             Passed - Patient is 12 years or older       Passed - Recent (12 mo) or future (30 days) visit within the authorizing provider's specialty    Patient had office visit in the last 12 months or has a visit in the next 30 days with authorizing provider or within the authorizing provider's specialty.  See \"Patient Info\" tab in inbasket, or \"Choose Columns\" in Meds & Orders section of the refill encounter.             Passed - No active pregnancy on record    If patient is pregnant or has had a positive pregnancy test, please check TSH.         Passed - No positive pregnancy test in past 12 months    If patient is pregnant or has had a positive pregnancy test, please check TSH.            "

## 2018-11-14 RX ORDER — LEVOTHYROXINE SODIUM 150 UG/1
150 TABLET ORAL DAILY
Qty: 90 TABLET | Refills: 2 | Status: SHIPPED | OUTPATIENT
Start: 2018-11-14 | End: 2019-07-05

## 2018-11-23 ENCOUNTER — TRANSFERRED RECORDS (OUTPATIENT)
Dept: HEALTH INFORMATION MANAGEMENT | Facility: CLINIC | Age: 53
End: 2018-11-23

## 2018-11-27 ENCOUNTER — TRANSFERRED RECORDS (OUTPATIENT)
Dept: HEALTH INFORMATION MANAGEMENT | Facility: CLINIC | Age: 53
End: 2018-11-27

## 2018-11-29 ENCOUNTER — MEDICAL CORRESPONDENCE (OUTPATIENT)
Dept: HEALTH INFORMATION MANAGEMENT | Facility: CLINIC | Age: 53
End: 2018-11-29

## 2018-11-29 ENCOUNTER — DOCUMENTATION ONLY (OUTPATIENT)
Dept: NEUROLOGY | Facility: CLINIC | Age: 53
End: 2018-11-29

## 2018-11-29 PROBLEM — M06.00 SERONEGATIVE RHEUMATOID ARTHRITIS (H): Status: ACTIVE | Noted: 2018-04-02

## 2018-11-29 PROBLEM — G24.3 TORTICOLLIS, SPASMODIC: Status: ACTIVE | Noted: 2017-11-07

## 2018-11-29 PROBLEM — M79.18 MYOFASCIAL MUSCLE PAIN: Status: ACTIVE | Noted: 2017-11-07

## 2018-11-29 RX ORDER — CHLORHEXIDINE GLUCONATE ORAL RINSE 1.2 MG/ML
SOLUTION DENTAL
Refills: 0 | COMMUNITY
Start: 2018-07-20 | End: 2019-01-10

## 2018-11-29 RX ORDER — DIAZEPAM 5 MG
TABLET ORAL
COMMUNITY
Start: 2018-11-09 | End: 2019-01-10

## 2018-11-29 RX ORDER — LEVOTHYROXINE SODIUM 137 UG/1
TABLET ORAL
Refills: 0 | COMMUNITY
Start: 2018-07-09 | End: 2019-01-10

## 2018-11-29 RX ORDER — HYDROCODONE BITARTRATE AND ACETAMINOPHEN 5; 325 MG/1; MG/1
TABLET ORAL
Refills: 0 | COMMUNITY
Start: 2018-07-20 | End: 2019-01-10

## 2018-11-29 RX ORDER — CELECOXIB 100 MG/1
CAPSULE ORAL
COMMUNITY
Start: 2018-10-12 | End: 2018-12-11

## 2018-11-29 RX ORDER — PENICILLIN V POTASSIUM 500 MG/1
TABLET, FILM COATED ORAL
Refills: 0 | COMMUNITY
Start: 2018-07-20 | End: 2019-01-10

## 2018-11-29 RX ORDER — AZATHIOPRINE 50 MG/1
100 TABLET ORAL DAILY
COMMUNITY
Start: 2018-10-01 | End: 2019-07-05

## 2018-11-29 RX ORDER — CLONAZEPAM 0.5 MG/1
0.5 TABLET ORAL
COMMUNITY
Start: 2018-08-24 | End: 2018-12-11

## 2018-12-06 NOTE — TELEPHONE ENCOUNTER
FUTURE VISIT INFORMATION      FUTURE VISIT INFORMATION:    Date: 12/18/18    Time: 1.40p    Location: [facility] [place]  REFERRAL INFORMATION:    Referring provider:  Dr. Deepak Camarillo     Referring providers clinic:  [facility]    Reason for visit/diagnosis  Deep Brain stimulation    RECORDS REQUESTED FROM:       Clinic name Comments Records Status Imaging Status   [facility] Dr. Camarillo Care Everywhere/  Received EPIC/PACS                                     12/6/18: External referral from Dr. Camarillo at [facility]. Records and imaging in EPIC/PACS.

## 2018-12-12 ENCOUNTER — PATIENT OUTREACH (OUTPATIENT)
Dept: CARE COORDINATION | Facility: CLINIC | Age: 53
End: 2018-12-12

## 2018-12-18 ENCOUNTER — PRE VISIT (OUTPATIENT)
Dept: NEUROLOGY | Facility: CLINIC | Age: 53
End: 2018-12-18

## 2019-01-10 ENCOUNTER — OFFICE VISIT (OUTPATIENT)
Dept: NEUROLOGY | Facility: CLINIC | Age: 54
End: 2019-01-10
Payer: COMMERCIAL

## 2019-01-10 VITALS
OXYGEN SATURATION: 97 % | DIASTOLIC BLOOD PRESSURE: 64 MMHG | HEART RATE: 67 BPM | SYSTOLIC BLOOD PRESSURE: 117 MMHG | BODY MASS INDEX: 24.44 KG/M2 | WEIGHT: 148 LBS

## 2019-01-10 DIAGNOSIS — G25.0 ESSENTIAL TREMOR: ICD-10-CM

## 2019-01-10 DIAGNOSIS — R94.31 ABNORMAL ELECTROCARDIOGRAM: ICD-10-CM

## 2019-01-10 DIAGNOSIS — R25.1 TREMOR: Primary | ICD-10-CM

## 2019-01-10 DIAGNOSIS — G24.1 DYSTONIA, TORSION, FRAGMENTS OF: ICD-10-CM

## 2019-01-10 DIAGNOSIS — G24.3 SPASMODIC TORTICOLLIS: ICD-10-CM

## 2019-01-10 DIAGNOSIS — Z84.89 FAMILY HISTORY OF GENETIC DISORDER: ICD-10-CM

## 2019-01-10 DIAGNOSIS — Z71.3 DIETARY RESTRICTION: ICD-10-CM

## 2019-01-10 DIAGNOSIS — K22.0 ACHALASIA: ICD-10-CM

## 2019-01-10 DIAGNOSIS — G24.3 CERVICAL DYSTONIA: ICD-10-CM

## 2019-01-10 RX ORDER — NICOTINE 14MG/24HR
PATCH, TRANSDERMAL 24 HOURS TRANSDERMAL
Refills: 3 | COMMUNITY
Start: 2019-01-10 | End: 2023-06-22

## 2019-01-10 RX ORDER — METHOCARBAMOL 500 MG/1
TABLET, FILM COATED ORAL
Qty: 180 TABLET | Refills: 3 | COMMUNITY
Start: 2019-01-10 | End: 2019-11-01

## 2019-01-10 RX ORDER — CLONAZEPAM 0.5 MG/1
TABLET ORAL
Qty: 90 TABLET | Refills: 3 | COMMUNITY
Start: 2019-01-10 | End: 2019-01-18

## 2019-01-10 RX ORDER — KETOCONAZOLE 20 MG/G
CREAM TOPICAL
COMMUNITY
Start: 2019-01-10 | End: 2021-01-11

## 2019-01-10 RX ORDER — PROPRANOLOL HYDROCHLORIDE 60 MG/1
TABLET ORAL
Qty: 180 TABLET | Refills: 3 | COMMUNITY
Start: 2019-01-10 | End: 2019-07-05

## 2019-01-10 ASSESSMENT — ENCOUNTER SYMPTOMS
POLYDIPSIA: 1
MEMORY LOSS: 0
COUGH: 1
WEAKNESS: 1
DIFFICULTY URINATING: 1
PANIC: 1
HEADACHES: 0
POLYPHAGIA: 1
INSOMNIA: 1
BACK PAIN: 0
DYSPNEA ON EXERTION: 0
LOSS OF CONSCIOUSNESS: 0
NECK PAIN: 1
SPEECH CHANGE: 0
WEIGHT LOSS: 0
HEMATURIA: 0
STIFFNESS: 1
NIGHT SWEATS: 0
SNORES LOUDLY: 0
DYSURIA: 0
FATIGUE: 1
NUMBNESS: 0
WEIGHT GAIN: 1
CHILLS: 0
MYALGIAS: 1
DEPRESSION: 1
FLANK PAIN: 0
SHORTNESS OF BREATH: 0
DIZZINESS: 0
HEMOPTYSIS: 0
MUSCLE WEAKNESS: 1
PARALYSIS: 0
NERVOUS/ANXIOUS: 1
HALLUCINATIONS: 0
ARTHRALGIAS: 1
SEIZURES: 0
DECREASED APPETITE: 0
JOINT SWELLING: 1
DISTURBANCES IN COORDINATION: 1
COUGH DISTURBING SLEEP: 1
INCREASED ENERGY: 0
FEVER: 0
MUSCLE CRAMPS: 0
TINGLING: 0
TREMORS: 1
ALTERED TEMPERATURE REGULATION: 0
DECREASED CONCENTRATION: 1
POSTURAL DYSPNEA: 0
WHEEZING: 0
SPUTUM PRODUCTION: 1

## 2019-01-10 ASSESSMENT — PAIN SCALES - GENERAL: PAINLEVEL: MODERATE PAIN (4)

## 2019-01-10 NOTE — PROGRESS NOTES
"    Summary and Recommendations:     Cervical dystonia/dystonic tremor - long standing  Neck pain. - dystonia and arthritis a factor.   Seen by me in  and     Citizen of [place] background. 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     Daughter Sonya may also be affected.     Seronegative rheumatoid arthritis.  On medications for this.  Has \"bad\" arthritis on her cervical mri at [facility].     Possible achalasia - ?nutcracker esophagus - noted in   Has not been back to see gastroenterology    PLAN  Pharmacy consultation to review medications in the past.   Has had sleeping issues.     Ongoing treatment for arthritis. Had epidural steroid.     DBS class    Consider a visit with gastroenterology.     Genetics consultation with Moreno Hamlin.    Would have to stop smoking prior to surgery if decides to do surgery.     She had a neck mri at [facility] and would need her images imported into our system.   Kassandra VAUGHAN dueñas =-  [phone]  Fax [phone]  [email or web address]      This was an 80 minute visit of which over 50% of the visit was spent in patient care and care coordination.   Tristin Murrieta MD  _____________________________________________________________________  PATIENT: Tequila Neumann  53 year old female   : 1965  BERNA: January 10, 2019    Consult requested by -         Outside records reviewed and revealed  - inserted.       History obtained from patient      History of Present Illness  52 yo woman dystonia/tremor    History of Present Illness  52 yo woman with tremor  Seronegative rheumatoid arthritis  Dystonia.  Lives in [place].    She has pain along the lateral posterior aspects of her neck  She has additional pain when she presses along the back of her neck.   Stiffness in her trapezii and along the side of neck. - both soreness and stiffness.     Scoliosis since childhood  Cervical dystonia since her 20s    Mother had " tremor.     Lasik surgery  Eyes are okay  Hearing okay  Smokes    Daughters are not living with her  She is working at [employer]  Horse is in the barn and has two dogs  No constipation or heartburn  She has thyroid problems and on medication for this  Borderline cholesterol  Thyroid medication was adjusted from 137 to 150  She has not had diabetes  No gyn - okay  Blood pressure has been good  Allergies methotrexate and sulfa  Id: see chart.   Mood - some depression  Sleep - terrible due to pain.   She sleeps in a bed  Does not use heat  Does not use ice  Hot flashes.   Menopause now.   Has tried accupuncture  Has tried melatonin at night.   She has not been on an antidepressant and not sure   Last ECG was 2015.   She has cut out wheat/gluten and refined sugar  Citrus can cause stomach attacks.   Bladder function - hard to make it to the bathroom in time.   Has urinary frequency. Has a small bladder. Has gotten worse.     She had an EGD in 2012 - possible achalasia noted     [employer] is her username for [employer].     2018 April  XR CERVICAL SPINE W OBL FLEX/EXT 6 OR GREATER VIEWS4/2/2018  Feedback & [facility]  Result Narrative   EXAM: XR CERVICAL SPINE W OBL FLEX/EXT 6 OR GREATER VIEWS    CLINICAL HISTORY: Seronegative rheumatoid arthritis (HC)     COMPARISON: None    TECHNIQUE: XR CERVICAL SPINE W OBL FLEX/EXT 6 OR GREATER VIEWS    FINDINGS: AP lateral, flexion and extension views of the cervical spine are submitted. Mild scoliosis convex to the right with the apex at C3-C4 identified on AP imaging. Neutral lateral imaging demonstrates a lordosis from C6 to C6. [place] is at C4. This subtends an angle of 12 degrees when measured along the posterior longitudinal ligament with the apex at the C3-C4 junction. Moderate anterior osteophytosis is noted at C4-C5. Additional anterior osteophytosis at C5-C6 and C6-C7 is seen.    Bilateral foraminal imaging demonstrates moderate foraminal hypertrophy at C3-C4  and C5-C6 on the right. No significant foraminal narrowing on the left is identified.    Flexion and extension views demonstrate mild anterolisthesis of C3 on C4 measuring 2 mm. 2 mm retrolisthesis of C4 on C5 is seen both on extension and flexion imaging and remains unchanged with motion.     IMPRESSION: Loss of normal alignment in neutral imaging as described above. Mild exaggeration of anterolisthesis of C3 on C4 during flexion imaging. Foraminal narrowing of the right C3-C4 and C5-C6 foramen are noted due to facet hypertrophy or uncovertebral hypertrophy.    Electronically signed by:  Manuel Banda on 4/4/2018 12:28 CDT   Status            Medications                 Abeteacept orencia 125mg/ml weekly             Azathioprine imuran 50mg             Celecoxib celebrex 100mg             Chlorhexidine peridex 0.12% solutino             Cholecalciferol vitamin d 1000 units              Clonazepam klonopin 0.5mg             Diazepam valium 5mg             Gabapentin neurontin 300mg             Hydrocodone-acetaminophen 5-235mg             Ketoconazole nizoral 2% cream             Levothyroxine synthroid/levothyroid 137 mcg tablet             Levothyroxine synthroid/levothyroid 150 mcg tablet             Methocarbamol robaxin 500mg             MVI             Nicotine nicoderm cq 14 mg/24 hr 24 hr ptach             Onabotulinum toxin             Penicillin V veetid 500mg             Propranolol 60mg              Varencicline chantix             Varerncicline chantix.                                                                           14 Review of systems  are negative except for   Patient Active Problem List   Diagnosis     ESSENTIAL TREMOR      Migraine     Hypothyroidism     Tobacco use disorder     RA (rheumatoid arthritis) (H)     CARDIOVASCULAR SCREENING; LDL GOAL LESS THAN 160     Hx of LASIK     Nutcracker esophagus     Insomnia     Cervical dystonia     Symptomatic menopausal or female climacteric states      ASCUS with positive high risk HPV cervical     Myofascial muscle pain     Seronegative rheumatoid arthritis (H)     Torticollis, spasmodic        Allergies   Allergen Reactions     Methotrexate Hives     Sulfa Drugs Nausea     Topamax      Personality change. Memory      Plaquenil [Hydroxychloroquine Sulfate] Rash     Past Surgical History:   Procedure Laterality Date     ARTHROSCOPY KNEE RT/LT  12/8/2005    Left medial meniscal tear bilat     COLONOSCOPY  7/11/2014    Procedure: COLONOSCOPY;  Surgeon: Randell Mcgarry MD;  Location: [facility]     D & C  x 2     ESOPHAGOSCOPY, GASTROSCOPY, DUODENOSCOPY (EGD), COMBINED  7/16/2012    Procedure: COMBINED ESOPHAGOSCOPY, GASTROSCOPY, DUODENOSCOPY (EGD);;  Surgeon: Lito Kwon MD;  Location:  GI     HC ESOPH/GAS REFLUX TEST W NASAL IMPED ELECTRODE  8/23/2012    Procedure: ESOPHAGEAL IMPEDENCE FUNCTION TEST 1 HOUR OR LESS;  Surgeon: Tequila Boone MD;  Location:  GI     LAPAROSCOPIC CHOLECYSTECTOMY  1/25/2012    Procedure:LAPAROSCOPIC CHOLECYSTECTOMY; Laparoscopic vs Open Cholecystectomy; Surgeon:RANDELL MCGARRY; Location:[facility]     [facility]  2000     LEEP TX, CERVICAL  1985     TUBAL LIGATION  1998     Past Medical History:   Diagnosis Date     Abnormal Pap smear of cervix 1985    s/p LEEP     Abnormal Pap smear of cervix 03/16/2018    See problem list     Calculus of GB w/ other cystitis 1/3/2012     Cervical high risk HPV (human papillomavirus) test positive 03/16/2018    See problem list     Social History     Socioeconomic History     Marital status: Single     Spouse name: Not on file     Number of children: 2     Years of education: Not on file     Highest education level: Not on file   Social Needs     Financial resource strain: Not on file     Food insecurity - worry: Not on file     Food insecurity - inability: Not on file     Transportation needs - medical: Not on file     Transportation needs - non-medical: Not on file    Occupational History     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 No   Social History Narrative    single. livews in [place]. Serene Cruz contact        Mother was 100% English    Thien is [place]         [place]    [place], [place] area            Tremor in her mother - had head tremor and hand tremor    Maternal grandmother with alzheimers disease    Parents disease  young    1 sister healthy    2: Biological children: 2 daughthers: 28 yrs and 29 yrs old: [place] [facility]    Lives with significant other in [facility] [place]    Sometimes drinks alcohol which helps her tremor     for [email or web address]; works in maple wood             Family History   Problem Relation Age of Onset     C.A.D. Father      Diabetes Father      Hypertension Father      Cerebrovascular Disease Father      Lipids Father      Breast Cancer Maternal Grandmother         age 80+     Alzheimer Disease Maternal Grandmother         age 70+     Cancer Mother         sarcoma, small intestinal     Neurologic Disorder Mother         tremor     Tremor Mother      Cerebrovascular Disease Sister         may have had mild strok     Colon Polyps Sister      Cancer - colorectal No family hx of      Prostate Cancer No family hx of      Current Outpatient Medications   Medication Sig Dispense Refill     Abatacept (ORENCIA) 125 MG/ML SOSY pre-filled syringe INJECT ONE SYRINGE (125 MG) SUBCUTANEOUSLY EVERY WEEK.  REFRIGERATE. ALLOW TO WARM TO ROOM TEMPERATURE PRIOR TO ADMINISTRATION.       Abatacept (ORENCIA) 125 MG/ML SOSY Inject 125 mg Subcutaneous once a week       azaTHIOprine (IMURAN) 50 MG tablet Take 100 mg by mouth daily       azaTHIOprine (IMURAN) 50 MG tablet Take 50 mg by mouth every evening.       celecoxib (CELEBREX) 100 MG capsule Take 1 capsule (100 mg) by mouth 2 times daily as needed for moderate pain 60 capsule 2     CHANTIX STARTING MONTH MARCO 0.5 MG X 11 & 1 MG X 42 tablet TAKE 0.5 MG TAB DAILY FOR 3 DAYS, THEN 0.5 MG TAB TWICE DAILY FOR 4 DAYS, THEN 1 MG TWICE DAILY.  0     chlorhexidine (PERIDEX) 0.12 % solution RINSE MOUTH TWICE A DAY WITH 15 CC FOR 30 SECONDS THEN EXPECTORATE  0     cholecalciferol (VITAMIN D) 1000 UNIT tablet Take 1 tablet by mouth daily.       clonazePAM (KLONOPIN) 0.5 MG tablet Take 1 tablet (0.5 mg) by mouth At Bedtime 90 tablet 2     [employer] NICOTINE 14 MG/24HR 24 hr patch PLACE 1 PATCH ONTO THE SKIN EVERY 24 HOURS  3     diazepam (VALIUM) 5 MG tablet        gabapentin (NEURONTIN) 300 MG capsule Take 300 mg by mouth       HYDROcodone-acetaminophen (NORCO) 5-325 MG tablet TAKE 1 TABLET BY MOUTH EVERY 4 TO 6 HOURS AS NEEDED FOR PAIN  0     ketoconazole (NIZORAL) 2 % external cream        levothyroxine (SYNTHROID/LEVOTHROID) 137 MCG tablet TAKE 1 TABLET (137 MCG) BY MOUTH DAILY NEEDS OFFICE VISIT & LABS FOR FURTHER REFILLS  0     levothyroxine (SYNTHROID/LEVOTHROID) 150 MCG tablet Take 1 tablet (150 mcg) by mouth daily 90 tablet 2     methocarbamol (ROBAXIN) 500 MG tablet Take 2 tablets (1,000 mg) by mouth 3 times daily as needed for muscle spasms 60 tablet 3     Multiple Vitamins-Minerals (THRIVE FOR LIFE WOMENS PO) Take 1 tablet by mouth daily       OnabotulinumtoxinA (BOTOX IJ) Inject 145 Units as directed       penicillin V (VEETID) 500 MG tablet TAKE 1 TABLET BY MOUTH 4 TIMES A DAY UNTIL GONE  0     propranolol HCl 60 MG TABS Disregard Quntity- DENIED- Has not made follow up appt. 1  tablet 0     Examination  B/P: 117/64, T: Data Unavailable, P: 67, R: Data Unavailable 148 lbs 0 oz  Blood pressure 117/64, pulse 67, weight 67.1 kg (148 lb), last menstrual period 02/20/2015, SpO2 97 %, not currently breastfeeding., Body mass index is 24.44 kg/m .    Vitals signs were added and reviewed if not above. Please refer to the chart from this visit.    General examination: well developed, nourished and normal affect  Carotid: No bruits. Chest CTA, Heart regular without gallops or murmurs. Abdomen soft nontender, no masses, bowel sounds intact. Periphery: normal pulses without edema. No skin lesions. MENTAL STATUS:  Alert, oriented x3.  Speech fluent with normal naming, repetition, comprehension.  Good right-left orientation, Can remember 2/3 objects.  5/5 on spelling world backwards.  CRANIAL NERVES:  Disks flat. Pupils are equal, round, reactive to light.  Normal vascularity and fields. Extraocular movements full.  Facial sensation and movement normal.  Hearing intact. Palate moves symmetrically.  Tongue midline.  Sternocleidomastoid and trapezius strength intact.  Neck strength was normal.  NEUROLOGIC:  Tone: normal. Motor in upper and lower extremities. 5/5.  Reflexes 2/4.  Toe signs downgoing.  Good finger-nose-finger, fine finger movement, heel-shin maneuver, sensation to light touch, position sense and vibration and temperature was normal. Gait normal. Romberg and postural stability normal Tremor dystonic head tremor. Left head tilt with no no tremor and rotation to the right. Has reduced right head rotation.     ----------------------------------------------------------------------------------------------------      Answers for HPI/ROS submitted by the patient on 1/10/2019   General Symptoms: Yes  Skin Symptoms: No  HENT Symptoms: No  EYE SYMPTOMS: No  HEART SYMPTOMS: No  LUNG SYMPTOMS: Yes  INTESTINAL SYMPTOMS: No  URINARY SYMPTOMS: Yes  GYNECOLOGIC SYMPTOMS: No  BREAST SYMPTOMS: No  SKELETAL  "SYMPTOMS: Yes  BLOOD SYMPTOMS: No  NERVOUS SYSTEM SYMPTOMS: Yes  MENTAL HEALTH SYMPTOMS: Yes  Fever: No  Loss of appetite: No  Weight loss: No  Weight gain: Yes  Fatigue: Yes  Night sweats: No  Chills: No  Increased stress: Yes  Excessive hunger: Yes  Excessive thirst: Yes  Feeling hot or cold when others believe the temperature is normal: No  Loss of height: Yes  Post-operative complications: No  Surgical site pain: No  Hallucinations: No  Change in or Loss of Energy: No  Hyperactivity: No  Confusion: No  Cough: Yes  Sputum or phlegm: Yes  Coughing up blood: No  Difficulty breating or shortness of breath: No  Snoring: No  Wheezing: No  Difficulty breathing on exertion: No  Nighttime Cough: Yes  Difficulty breathing when lying flat: No  Trouble holding urine or incontinence: Yes  Pain or burning: No  Trouble starting or stopping: No  Increased frequency of urination: Yes  Blood in urine: No  Decreased frequency of urination: No  Frequent nighttime urination: Yes  Flank pain: No  Difficulty emptying bladder: Yes  Back pain: No  Muscle aches: Yes  Neck pain: Yes  Swollen joints: Yes  Joint pain: Yes  Bone pain: No  Muscle cramps: No  Muscle weakness: Yes  Joint stiffness: Yes  Bone fracture: No  Trouble with coordination: Yes  Dizziness or trouble with balance: No  Fainting or black-out spells: No  Memory loss: No  Headache: No  Seizures: No  Speech problems: No  Tingling: No  Tremor: Yes  Weakness: Yes  Difficulty walking: No  Paralysis: No  Numbness: No  Nervous or Anxious: Yes  Depression: Yes  Trouble sleeping: Yes  Trouble thinking or concentrating: Yes  Mood changes: Yes  Panic attacks: Yes         Return movement disorders     She had tried topamax and did not like the personality change and so went off it.  She is on propranolol and is also on flexeril from her rheumatologist.  Her mother has \"essential tremor\"  The flexeril relaxes the muscles and helps the tremor but makes her tired and so it has some hang " over  She is seeing a chiropractor  She had seen Dr. Marie and the botox did not work so well.     Cc: 48 year old female   Issues discussed today        Patient was asked about 14 Review of systems including changes in vision (dry eyes, double vision), hearing, heart, lungs, musculoskeletal, depression, anxiety, snoring, RBD, insomnia, urinary frequency, urinary urgency, constipation, swallowing problems, hematological, ID, allergies, skin problems: seborrhea, endocrinological: thyroid, diabetes, cholesterol; balance, weight changes, and other neurological problems and these were not significant at this time except for       Patient Active Problem List   Diagnosis     ESSENTIAL TREMOR      MIGRAINES     HYPOTHYROIDISM      TOBACCO USE DISORDER     RA (rheumatoid arthritis)     Menorrhagia     CARDIOVASCULAR SCREENING; LDL GOAL LESS THAN 160     Calculus of GB w/ other cystitis     Family history of tremor     Wears glasses     Hx of LASIK     Nutcracker esophagus     Insomnia     Thyroid disease     Cervical dystonia                  Allergies   Allergen Reactions     Sulfa Drugs Nausea     Topamax         Personality change. Memory      Plaquenil (Hydroxychloroquine Sulfate) Rash      Past Surgical History         Past Surgical History   Procedure Date     Tubal ligation 1998     Lasik 2000     Leep tx, cervical 1985     D & c x 2     Laparoscopic cholecystectomy 1/25/2012       Procedure:LAPAROSCOPIC CHOLECYSTECTOMY; Laparoscopic vs Open Cholecystectomy; Surgeon:RANDELL MCGARRY; Location:[facility]     Esophagoscopy, gastroscopy, duodenoscopy (egd), combined 7/16/2012       Procedure: COMBINED ESOPHAGOSCOPY, GASTROSCOPY, DUODENOSCOPY (EGD);;  Surgeon: Lito Kwon MD;  Location:  GI     Arthroscopy knee rt/lt 12/8/2005       Left medial meniscal tear bilat     Hc esoph/gas reflux test w nasal imped electrode 8/23/2012       Procedure: ESOPHAGEAL IMPEDENCE FUNCTION TEST 1 HOUR OR LESS;   Surgeon: Tequila Boone MD;  Location: [facility]         Past Medical History         Past Medical History   Diagnosis Date     Essential and other specified forms of tremor       Depressive disorder, not elsewhere classified         w/anxiety     Migraine, unspecified, without mention of intractable migraine without mention of status migrainosus       Unspecified hypothyroidism       Abnormal Pap smear of cervix        s/p LEEP     Rheumatoid arthritis       Family history of tremor 10/11/2012     Wears glasses 10/11/2012     Hx of LASIK 10/11/2012     Nutcracker esophagus 10/11/2012     Insomnia 10/11/2012     Thyroid disease 10/11/2012     Cervical dystonia 2013         Social History   History            Social History     Marital Status: Single       Spouse Name: N/A       Number of Children: 2     Years of Education: N/A      Occupational History     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 No          Social History Narrative     Mother was 100% FinnishMarko is [place], [place] areaTremor in her mother - had head tremor and hand tremorMaternal grandmother with alzheimers diseaseParents disease  young1 sister healthy2: Biological children: 2 daughthers: 28 yrs and 29 yrs old: [place] and 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        History obtained from patient     Over 50% of this visit was spent in patient care and care coordination. Total visit times was 25 minutes     Summary and Recommendations:      Dystonic head tremor  She was unable to tolerate the topamax     She will try baclofen at 5-10mg three times a day as needed.  Rx sent to pharmacy.  Information given about the drug  She will start on 5mg initially and see if there is hangover.     Other options would be zanaflex (tizanidine) or artane (trihexyphenidyl)  website information:  [email or web address]  Cervical dystonia. Spasmodic torticollis websites as well.      She will return as needed.      Tristin Murrieta MD      Consultation      Tremor been present for years - back to her 20yrs which was more in her hand than head but now more head than hands. The head tremor has increased over the last year and has been increaed with stress and the propranolol will decrease her tremor. It varies from day to day. Alcohol helps the tremor.  She is on a betablocker that has waned in benefit. She has right head shift and does use the phone. She is resting her head backwards but this had not alleviate the shaking but does improve the pain.  When she saw Guerita and they recc a visit to [facility]     Family history of tremor in her mother who had hand tremor   Her daughter may have a tremor.  Her mother was [place]er and her Northern Irish grandmother had dementia     Social history, family history, allergy, medications etc and review of systems were reviewed.      Cc: 47 year old female      14 Review of systems  are negative except for       Patient Active Problem List   Diagnoses     ESSENTIAL TREMOR      MIGRAINES     HYPOTHYROIDISM      TOBACCO USE DISORDER     RA (rheumatoid arthritis)     Menorrhagia     CARDIOVASCULAR SCREENING; LDL GOAL LESS THAN 160     Calculus of GB w/ other cystitis "     Family history of tremor     Wears glasses     Hx of LASIK     Nutcracker esophagus     Insomnia     Thyroid disease         Allergies   Allergen Reactions     Sulfa Drugs Nausea     Plaquenil (Hydroxychloroquine Sulfate) Rash      Past Surgical History         Past Surgical History   Procedure Date     Tubal ligation 1998     Lasik 2000     Leep tx, cervical 1985     D & c x 2     Laparoscopic cholecystectomy 1/25/2012       Procedure:LAPAROSCOPIC CHOLECYSTECTOMY; Laparoscopic vs Open Cholecystectomy; Surgeon:RANDELL MCGARRY; Location:[place] [facility]     Esophagoscopy, gastroscopy, duodenoscopy (egd), combined 7/16/2012       Procedure: COMBINED ESOPHAGOSCOPY, GASTROSCOPY, DUODENOSCOPY (EGD);;  Surgeon: Lito Kwon MD;  Location:  GI     Arthroscopy knee rt/lt 12/8/2005       Left medial meniscal tear bilat     Hc esoph/gas reflux test w nasal imped electrode 8/23/2012       Procedure: ESOPHAGEAL IMPEDENCE FUNCTION TEST 1 HOUR OR LESS;  Surgeon: Tequila Boone MD;  Location:  GI         Past Medical History         Past Medical History   Diagnosis Date     Essential and other specified forms of tremor       Depressive disorder, not elsewhere classified         w/anxiety     Migraine, unspecified, without mention of intractable migraine without mention of status migrainosus       Unspecified hypothyroidism       Abnormal Pap smear of cervix 1985       s/p LEEP     Rheumatoid arthritis       Family history of tremor 10/11/2012     Wears glasses 10/11/2012     Hx of LASIK 10/11/2012     Nutcracker esophagus 10/11/2012     Insomnia 10/11/2012     Thyroid disease 10/11/2012         Social History   History            Social History     Marital Status: Single       Spouse Name: N/A       Number of Children: 2     Years of Education: N/A           Occupational History     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 Father       Diabetes Father       Hypertension Father       Stroke Father       Lipids Father       Breast CA Maternal Grandmother         age 80+      Alzheimers Maternal Grandmother         age 70+      Colon CA No family hx of       Prostatic CA No family hx of       Cancer Mother         sarcoma, small intestinal      Neurological Mother         tremor             Examination  B/P: 119/86, T: Data Unavailable, P: 62, R: Data Unavailable 140 lbs 0 oz  Blood pressure 119/86, pulse 62, height 1.651 m (5' 5\"), weight 63.504 kg (140 lb)., Body mass index is 23.30 kg/(m^2).     General examination: well developed, nourished and normal affect  Carotid: No bruits. Chest CTA, Heart regular without gallops or murmurs. Abdomen soft nontender, no masses, bowel sounds intact. Periphery: normal pulses without edema. No skin lesions. MENTAL STATUS:  Alert, oriented x3.  Speech fluent with normal naming, repetition, comprehension.  Good right-left orientation, Can remember 3/3 objects.   CRANIAL " NERVES:  Disks flat. Pupils are equal, round, reactive to light.  Normal vascularity and fields. Extraocular movements full.  Facial sensation and movement normal.  Hearing intact. Palate moves symmetrically.  Tongue midline.  Sternocleidomastoid and trapezius strength intact.  Neck strength was normal.  NEUROLOGIC:  Tone: normal. Motor in upper and lower extremities. 5/5.  Reflexes 2/4.  Toe signs downgoing.  Good finger-nose-finger, fine finger movement, heel-shin maneuver, sensation to light touch, position sense and vibration and temperature was normal. Gait normal. Romberg and postural stability - Tremor present - irregular head movement with right head shift      Outside records reviewed and revealed -  History obtained from patient     Summary and Recommendations:      Cervical dystonia with dystonic head tremor  She has minimal hand/action tremor     She is on beta blockers     She will try topamax (topiramate) with her propranolol      topiramate 25mg daily x 1wk; 25mg twice daily x 1 wk then 50mg twice daily x 1 wk then 75mg twice bruce x 1 week then 100mg twice daily  Very rare side effects are rash, eye pain, and more commonly are word finding problems and renal stones.      Call us to report back on how you are doing     Consider another treatment option if the topiramate did not work may consider a trial of primidone (mysoline) with the propranolol.     Would continue the botox depending on the response to topiramate     Return to see Eliz Kaplan in 4-8 weeks to review how you are doing.      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      Deepak Camarillo MD - 11/29/2018 10:39 AM CST  OFFICE CLINIC VISIT    Chief complaint: Tremors.     History of present illness: Ms. Neumann presents today and tells me that her tremors have continued. She does not feel that Botox is helping her enough. She says that there is some mild  improvement with Botox, but does not feel that it is worth the weakness or the injections themselves. She wants to discuss other avenues of treatment.     On examination she continues to have a dystonic/essential tremor, and increased tone in bilateral sternocleidomastoid and posterior neck muscles. I do not think that she is much changed as compared to her baseline.     Assessment and plan: Ms. Neumann presents today with some frustration in terms of her tremor management. She has struggled with this for years, and has tried Botox, propranolol, Topamax and clonazepam. She did not benefit very much from Topamax, and very recently we added gabapentin to her schedule. She thinks that gabapentin produces drowsiness, and it is difficult for her to take more than 300 mg two to three times a day. She does not think that it helps control her tremor at all.     Propranolol and clonazepam have worked best for her. She also has some myofascial pain and feels that tizanidine has helped with that to some extent.     I think that at this point we should probably discontinue Botox treatments given her lack of response and frustration with injections. She has been taking propranolol on a p.r.n. basis and I think that we should make it a regular scheduled medication at 60 mg twice daily. Her blood pressure today was 100/70 with a pulse rate of 58. I have asked her to maintain a blood pressure log as she starts taking propranolol on a regular basis. I think that gabapentin should be discontinued. She should monitor her symptoms, and we will probably either increase propranolol if her blood pressure allows, or add primidone or an alternative antiepileptic medication to her control.     I also think that she should visit with a deep brain stimulation specialist to discuss that possibility.     I will be referring her to the [facility] and the [facility] for further consultation and directional guidance.     I have  discussed the above details with Ms. Neumann at great length and she expressed understanding. She seemed satisfied with this conversation, and had no further queries as of now. She has our contact information and has been advised to stay in touch with us regarding any other issues that may arise.     Thank you for allowing me to participate in Ms. Neumann's care.     _______________________________________________________________________  MD FAN Berman/rb    DD: 11/27/2018 09:42:37  DT: 11/29/2018 09:39:50  Job #: [id number]      Back to top of Progress Notes  Deepak Camarillo MD - 11/23/2018 10:20 AM CST  Formatting of this note may be different from the original.  Patient Instructions   1. We will discontinue BOTOX treatments.   2. Discuss Deep Brain stimulation with specialists.   3. Take propranolol at 60 mg twice daily. Maintain BP log.  4. Discontinue Gabapentin.   5. Monitor symptoms. We may need to consider primidone in the future.  6. Please keep a log of any benefits/ side effects and schedule a follow up in 2m.     This note has been dictated. The encounter number is [phone]. A total time of 28 minutes spent with the patient, more than 50% of the time was spent counseling/education.         Back to top of Progress Notes      Nursing Notes  - in this encounter  Aubrie Bond - 11/23/2018 10:20 AM CST  Formatting of this note may be different from the original.  Allergies:  Methotrexate; Sulfasalazine; Topiramate; and Hydroxychloroquine sulfate  Current Outpatient Prescriptions   Medication Sig Dispense Refill     azaTHIOprine (IMURAN) 50 mg tablet Take 2 tablets by mouth once daily. 180 tablet 1     celecoxib (CELEBREX) 100 mg capsule     cholecalciferol (VITAMIN D) 1,000 unit tablet Take 1 tablet by mouth.     clonazePAM (KLONOPIN) 0.5 mg tablet TAKE 1 TABLET AT BEDTIME 30 tablet 1     gabapentin (NEURONTIN) 300 mg capsule TAKE 1 CAPSULE BY MOUTH 3 TIMES DAILY. 270 capsule 1     ketoconazole  2% topical (NIZORAL) cream     levothyroxine (SYNTHROID) 150 mcg tablet Take 150 mcg by mouth once daily. 2     methocarbamol (ROBAXIN) 500 mg tablet Take 1,000 mg by mouth.     nicotine 14 mg/24 hr (NICODERM; HABITROL) 14 mg/24 hr patch PLACE 1 PATCH ONTO THE SKIN EVERY 24 HOURS 3     ORENCIA subcutaneous syringe INJECT ONE SYRINGE (125 MG) SUBCUTANEOUSLY EVERY WEEK. REFRIGERATE. ALLOW TO WARM TO ROOM TEMPERATURE PRIOR TO ADMINISTRATION. 4 Syringe 5     propranolol (INDERAL) 60 mg tablet Take 1 tablet by mouth 2 times daily. 60 tablet 0     tiZANidine (ZANAFLEX) 2 mg tablet As needed     No current facility-administered medications for this visit.     Medications have been reviewed by me and are current to the best of my knowledge and ability.    Past Medical History:   Diagnosis Date     Cervical dystonia     Hypothyroidism     Rheumatoid arthritis(714.0)     Tremor, essential     Family History   Problem Relation Age of Onset     Cancer Mother     Heart attack Father     Cancer-breast Maternal Grandmother     Stroke Paternal Grandmother     Social History   Substance Use Topics     Smoking status: Current Every Day Smoker   Packs/day: 0.50     Smokeless tobacco: Never Used   Comment: aprox. 3 a day     Alcohol use Yes   Comment: a little bit     Brief Presenting History: Patient presents today for a follow up to discuss medication management for tremors. She states she has not been taking propranolol on a daily basis due to fatigue. She would like to discuss going off the medication. She also states she would like to discontinue Botox injections. She states her pain was well controlled with Botox however her tremor seemed to be worse and she had swallowing issues after injections each time.  Aubrie Bond LPN .................11/23/2018 11:10 AM       COMPLETE REVIEW OF SYSTEMS:   General: Denies general constitutional problems  Eyes: Denies problems  Ears/Nose/Throat: Denies problems  Cardiovascular: Denies  problems  Respiratory: Denies problems  Gastrointestinal: Denies problems  Musculoskeletal: NEGATIVE except for: neck pain, joint pain  Skin: Denies problems  Endocrine: Denies problems  Heme/Lymphatic: Denies problems  Allergic/Immunologic: Denies problems

## 2019-01-10 NOTE — NURSING NOTE
Chief Complaint   Patient presents with     New Patient     [facility] NEW MOVEMENT DISORDER DBS       Halley Linder, EMT

## 2019-01-10 NOTE — PATIENT INSTRUCTIONS
"Cervical dystonia/dystonic tremor - long standing  Neck pain. - dystonia and arthritis a factor.   Seen by me in 2012 and 2013    French background. 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     Daughter Sonya may also be affected.     Seronegative rheumatoid arthritis.  On medications for this.  Has \"bad\" arthritis on her cervical mri at [facility].     Possible achalasia - ?nutcracker esophagus - noted in 2012  Has not been back to see gastroenterology    PLAN  Pharmacy consultation to review medications in the past.   Has had sleeping issues.     Ongoing treatment for arthritis. Had epidural steroid.     DBS class    Consider a visit with gastroenterology.     Genetics consultation with Moreno Hamlin.    Would have to stop smoking prior to surgery if decides to do surgery.     She had a neck mri at [facility] and would need her images imported into our system.   Kassandra dueñas =-  [phone]  Fax [phone]  [email or web address]  "

## 2019-01-10 NOTE — LETTER
"1/10/2019       RE: Tequila Neumann  [address]  [place] [address]     Dear Colleague,    Thank you for referring your patient, Tequila Neumann, to the [facility] NEUROLOGY at [facility]. Please see a copy of my visit note below.        Agai    Summary and Recommendations:     Cervical dystonia/dystonic tremor - long standing  Neck pain. - dystonia and arthritis a factor.   Seen by me in  and     [place] background. 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     Daughter Sonya may also be affected.     Seronegative rheumatoid arthritis.  On medications for this.  [place] \"bad\" arthritis [place] [facility] [place]     Possible achalasia - ?nutcracker esophagus - noted in   Has not been back to see gastroenterology    PLAN  Pharmacy consultation to review medications in the past.   Has had sleeping issues.     Ongoing treatment for arthritis. Had epidural steroid.     DBS class    Consider a visit with gastroenterology.     Genetics consultation with Moreno Hamlin.    Would have to stop smoking prior to surgery if decides to do surgery.     She had a neck mri at [facility] and would need her images imported into our system.   Kassandra dueñas =-  [phone]  Fax [phone]  [email or web address]      This was an 80 minute visit of which over 50% of the visit was spent in patient care and care coordination.   Tristin Murrieta MD  _____________________________________________________________________  PATIENT: Tequila Neumann  53 year old female   : 1965  BERNA: January 10, 2019    Consult requested by -         Outside records reviewed and revealed  - inserted.       History obtained from patient      History of Present Illness  54 yo woman dystonia/tremor    History of Present Illness  54 yo woman with tremor  Seronegative rheumatoid arthritis  Dystonia.  Lives in [place].    She has pain " along the lateral posterior aspects of her neck  She has additional pain when she presses along the back of her neck.   Stiffness in her trapezii and along the side of neck. - both soreness and stiffness.     Scoliosis since childhood  Cervical dystonia since her 20s    Mother had tremor.     Lasik surgery  Eyes are okay  Hearing okay  Smokes    Daughters are not living with her  She is working at [employer]  Horse is in the barn and has two dogs  No constipation or heartburn  She has thyroid problems and on medication for this  Borderline cholesterol  Thyroid medication was adjusted from 137 to 150  She has not had diabetes  No gyn - okay  Blood pressure has been good  Allergies methotrexate and sulfa  Id: see chart.   Mood - some depression  Sleep - terrible due to pain.   She sleeps in a bed  Does not use heat  Does not use ice  Hot flashes.   Menopause now.   Has tried accupuncture  Has tried melatonin at night.   She has not been on an antidepressant and not sure   Last ECG was 2015.   She has cut out wheat/gluten and refined sugar  Citrus can cause stomach attacks.   Bladder function - hard to make it to the bathroom in time.   Has urinary frequency. Has a small bladder. Has gotten worse.     She had an EGD in 2012 - possible achalasia noted     Cambly is her username for [employer].     2018 April  XR CERVICAL SPINE W OBL FLEX/EXT 6 OR GREATER VIEWS4/2/2018  [facility]  Result Narrative   EXAM: XR CERVICAL SPINE W OBL FLEX/EXT 6 OR GREATER VIEWS    CLINICAL HISTORY: Seronegative rheumatoid arthritis (HC)     COMPARISON: None    TECHNIQUE: XR CERVICAL SPINE W OBL FLEX/EXT 6 OR GREATER VIEWS    FINDINGS: AP lateral, flexion and extension views of the cervical spine are submitted. Mild scoliosis convex to the right with the apex at C3-C4 identified on AP imaging. Neutral lateral imaging demonstrates a lordosis from C6 to C6. [place] is at C4. This subtends an angle of 12 degrees when  measured along the posterior longitudinal ligament with the apex at the C3-C4 junction. Moderate anterior osteophytosis is noted at C4-C5. Additional anterior osteophytosis at C5-C6 and C6-C7 is seen.    Bilateral foraminal imaging demonstrates moderate foraminal hypertrophy at C3-C4 and C5-C6 on the right. No significant foraminal narrowing on the left is identified.    Flexion and extension views demonstrate mild anterolisthesis of C3 on C4 measuring 2 mm. 2 mm retrolisthesis of C4 on C5 is seen both on extension and flexion imaging and remains unchanged with motion.     IMPRESSION: Loss of normal alignment in neutral imaging as described above. Mild exaggeration of anterolisthesis of C3 on C4 during flexion imaging. Foraminal narrowing of the right C3-C4 and C5-C6 foramen are noted due to facet hypertrophy or uncovertebral hypertrophy.    Electronically signed by:  Manuel Banda on 4/4/2018 12:28 CDT   Status            Medications                 Abeteacept orencia 125mg/ml weekly             Azathioprine imuran 50mg             Celecoxib celebrex 100mg             Chlorhexidine peridex 0.12% solutino             Cholecalciferol vitamin d 1000 units              Clonazepam klonopin 0.5mg             Diazepam valium 5mg             Gabapentin neurontin 300mg             Hydrocodone-acetaminophen 5-235mg             Ketoconazole nizoral 2% cream             Levothyroxine synthroid/levothyroid 137 mcg tablet             Levothyroxine synthroid/levothyroid 150 mcg tablet             Methocarbamol robaxin 500mg             MVI             Nicotine nicoderm cq 14 mg/24 hr 24 hr ptach             Onabotulinum toxin             Penicillin V veetid 500mg             Propranolol 60mg              Varencicline chantix             Varerncicline chantix.                                                                           14 Review of systems  are negative except for   Patient Active Problem List   Diagnosis     ESSENTIAL  TREMOR      Migraine     Hypothyroidism     Tobacco use disorder     RA (rheumatoid arthritis) (H)     CARDIOVASCULAR SCREENING; LDL GOAL LESS THAN 160     Hx of LASIK     Nutcracker esophagus     Insomnia     Cervical dystonia     Symptomatic menopausal or female climacteric states     ASCUS with positive high risk HPV cervical     Myofascial muscle pain     Seronegative rheumatoid arthritis (H)     Torticollis, spasmodic        Allergies   Allergen Reactions     Methotrexate Hives     Sulfa Drugs Nausea     Topamax      Personality change. Memory      Plaquenil [Hydroxychloroquine Sulfate] Rash     Past Surgical History:   Procedure Laterality Date     ARTHROSCOPY KNEE RT/LT  12/8/2005    Left medial meniscal tear bilat     COLONOSCOPY  7/11/2014    Procedure: COLONOSCOPY;  Surgeon: Randell Mcgarry MD;  Location: [facility]     D & C  x 2     ESOPHAGOSCOPY, GASTROSCOPY, DUODENOSCOPY (EGD), COMBINED  7/16/2012    Procedure: COMBINED ESOPHAGOSCOPY, GASTROSCOPY, DUODENOSCOPY (EGD);;  Surgeon: Lito Kwon MD;  Location: [facility]     HC ESOPH/GAS REFLUX TEST W NASAL IMPED ELECTRODE  8/23/2012    Procedure: ESOPHAGEAL IMPEDENCE FUNCTION TEST 1 HOUR OR LESS;  Surgeon: Tequila Boone MD;  Location: [facility]     LAPAROSCOPIC CHOLECYSTECTOMY  1/25/2012    Procedure:LAPAROSCOPIC CHOLECYSTECTOMY; Laparoscopic vs Open Cholecystectomy; Surgeon:RANDELL MCGARRY; Location:[facility]     [facility]  2000     LEEP TX, CERVICAL  1985     TUBAL LIGATION  1998     Past Medical History:   Diagnosis Date     Abnormal Pap smear of cervix 1985    s/p LEEP     Abnormal Pap smear of cervix 03/16/2018    See problem list     Calculus of GB w/ other cystitis 1/3/2012     Cervical high risk HPV (human papillomavirus) test positive 03/16/2018    See problem list     Social History     Socioeconomic History     Marital status: Single     Spouse name: Not on file     Number of children: 2     Years of education: Not on file      Highest education level: Not on file   Social Needs     Financial resource strain: Not on file     Food insecurity - worry: Not on file     Food insecurity - inability: Not on file     Transportation needs - medical: Not on file     Transportation needs - non-medical: Not on file   Occupational History     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 No   Social History Narrative    single. livews in [place]. Serene Cruz contact        Mother was 100% Fijian    Thien is Georgian         [place] Area    [place], [place] area            Tremor in her mother - had head tremor and hand tremor    Maternal grandmother with alzheimers disease    Parents disease  young    1 sister healthy    2: Biological children: 2 daughthers: 28 yrs and 29 yrs old: [place] and [facility]    Lives with significant other in [facility]    Sometimes drinks alcohol which helps her tremor     for [employer]; works in maple wood             Family History   Problem Relation Age of Onset     C.A.D. Father      Diabetes Father      Hypertension Father      Cerebrovascular Disease Father      Lipids Father      Breast Cancer Maternal Grandmother         age 80+     Alzheimer Disease Maternal Grandmother         age 70+     Cancer Mother         sarcoma, small intestinal     Neurologic Disorder Mother         tremor     Tremor Mother      Cerebrovascular Disease Sister         may have had mild  strok     Colon Polyps Sister      Cancer - colorectal No family hx of      Prostate Cancer No family hx of      Current Outpatient Medications   Medication Sig Dispense Refill     Abatacept (ORENCIA) 125 MG/ML SOSY pre-filled syringe INJECT ONE SYRINGE (125 MG) SUBCUTANEOUSLY EVERY WEEK. REFRIGERATE. ALLOW TO WARM TO ROOM TEMPERATURE PRIOR TO ADMINISTRATION.       Abatacept (ORENCIA) 125 MG/ML SOSY Inject 125 mg Subcutaneous once a week       azaTHIOprine (IMURAN) 50 MG tablet Take 100 mg by mouth daily       azaTHIOprine (IMURAN) 50 MG tablet Take 50 mg by mouth every evening.       celecoxib (CELEBREX) 100 MG capsule Take 1 capsule (100 mg) by mouth 2 times daily as needed for moderate pain 60 capsule 2     CHANTIX STARTING MONTH MARCO 0.5 MG X 11 & 1 MG X 42 tablet TAKE 0.5 MG TAB DAILY FOR 3 DAYS, THEN 0.5 MG TAB TWICE DAILY FOR 4 DAYS, THEN 1 MG TWICE DAILY.  0     chlorhexidine (PERIDEX) 0.12 % solution RINSE MOUTH TWICE A DAY WITH 15 CC FOR 30 SECONDS THEN EXPECTORATE  0     cholecalciferol (VITAMIN D) 1000 UNIT tablet Take 1 tablet by mouth daily.       clonazePAM (KLONOPIN) 0.5 MG tablet Take 1 tablet (0.5 mg) by mouth At Bedtime 90 tablet 2     [employer] NICOTINE 14 MG/24HR 24 hr patch PLACE 1 PATCH ONTO THE SKIN EVERY 24 HOURS  3     diazepam (VALIUM) 5 MG tablet        gabapentin (NEURONTIN) 300 MG capsule Take 300 mg by mouth       HYDROcodone-acetaminophen (NORCO) 5-325 MG tablet TAKE 1 TABLET BY MOUTH EVERY 4 TO 6 HOURS AS NEEDED FOR PAIN  0     ketoconazole (NIZORAL) 2 % external cream        levothyroxine (SYNTHROID/LEVOTHROID) 137 MCG tablet TAKE 1 TABLET (137 MCG) BY MOUTH DAILY NEEDS OFFICE VISIT & LABS FOR FURTHER REFILLS  0     levothyroxine (SYNTHROID/LEVOTHROID) 150 MCG tablet Take 1 tablet (150 mcg) by mouth daily 90 tablet 2     methocarbamol (ROBAXIN) 500 MG tablet Take 2 tablets (1,000 mg) by mouth 3 times daily as needed for muscle spasms 60 tablet 3     Multiple Vitamins-Minerals (THRIVE FOR  LIFE WOMENS PO) Take 1 tablet by mouth daily       OnabotulinumtoxinA (BOTOX IJ) Inject 145 Units as directed       penicillin V (VEETID) 500 MG tablet TAKE 1 TABLET BY MOUTH 4 TIMES A DAY UNTIL GONE  0     propranolol HCl 60 MG TABS Disregard Quntity- DENIED- Has not made follow up appt. 1 tablet 0     Examination  B/P: 117/64, T: Data Unavailable, P: 67, R: Data Unavailable 148 lbs 0 oz  Blood pressure 117/64, pulse 67, weight 67.1 kg (148 lb), last menstrual period 02/20/2015, SpO2 97 %, not currently breastfeeding., Body mass index is 24.44 kg/m .    Vitals signs were added and reviewed if not above. Please refer to the chart from this visit.    General examination: well developed, nourished and normal affect  Carotid: No bruits. Chest CTA, Heart regular without gallops or murmurs. Abdomen soft nontender, no masses, bowel sounds intact. Periphery: normal pulses without edema. No skin lesions. MENTAL STATUS:  Alert, oriented x3.  Speech fluent with normal naming, repetition, comprehension.  Good right-left orientation, Can remember 2/3 objects.  5/5 on spelling world backwards.  CRANIAL NERVES:  Disks flat. Pupils are equal, round, reactive to light.  Normal vascularity and fields. Extraocular movements full.  Facial sensation and movement normal.  Hearing intact. Palate moves symmetrically.  Tongue midline.  Sternocleidomastoid and trapezius strength intact.  Neck strength was normal.  NEUROLOGIC:  Tone: normal. Motor in upper and lower extremities. 5/5.  Reflexes 2/4.  Toe signs downgoing.  Good finger-nose-finger, fine finger movement, heel-shin maneuver, sensation to light touch, position sense and vibration and temperature was normal. Gait normal. Romberg and postural stability normal Tremor dystonic head tremor. Left head tilt with no no tremor and rotation to the right. Has reduced right head rotation.      ----------------------------------------------------------------------------------------------------      Answers for HPI/ROS submitted by the patient on 1/10/2019   General Symptoms: Yes  Skin Symptoms: No  HENT Symptoms: No  EYE SYMPTOMS: No  HEART SYMPTOMS: No  LUNG SYMPTOMS: Yes  INTESTINAL SYMPTOMS: No  URINARY SYMPTOMS: Yes  GYNECOLOGIC SYMPTOMS: No  BREAST SYMPTOMS: No  SKELETAL SYMPTOMS: Yes  BLOOD SYMPTOMS: No  NERVOUS SYSTEM SYMPTOMS: Yes  MENTAL HEALTH SYMPTOMS: Yes  Fever: No  Loss of appetite: No  Weight loss: No  Weight gain: Yes  Fatigue: Yes  Night sweats: No  Chills: No  Increased stress: Yes  Excessive hunger: Yes  Excessive thirst: Yes  Feeling hot or cold when others believe the temperature is normal: No  Loss of height: Yes  Post-operative complications: No  Surgical site pain: No  Hallucinations: No  Change in or Loss of Energy: No  Hyperactivity: No  Confusion: No  Cough: Yes  Sputum or phlegm: Yes  Coughing up blood: No  Difficulty breating or shortness of breath: No  Snoring: No  Wheezing: No  Difficulty breathing on exertion: No  Nighttime Cough: Yes  Difficulty breathing when lying flat: No  Trouble holding urine or incontinence: Yes  Pain or burning: No  Trouble starting or stopping: No  Increased frequency of urination: Yes  Blood in urine: No  Decreased frequency of urination: No  Frequent nighttime urination: Yes  Flank pain: No  Difficulty emptying bladder: Yes  Back pain: No  Muscle aches: Yes  Neck pain: Yes  Swollen joints: Yes  Joint pain: Yes  Bone pain: No  Muscle cramps: No  Muscle weakness: Yes  Joint stiffness: Yes  Bone fracture: No  Trouble with coordination: Yes  Dizziness or trouble with balance: No  Fainting or black-out spells: No  Memory loss: No  Headache: No  Seizures: No  Speech problems: No  Tingling: No  Tremor: Yes  Weakness: Yes  Difficulty walking: No  Paralysis: No  Numbness: No  Nervous or Anxious: Yes  Depression: Yes  Trouble sleeping: Yes  Trouble  "thinking or concentrating: Yes  Mood changes: Yes  Panic attacks: Yes         Return movement disorders     She had tried topamax and did not like the personality change and so went off it.  She is on propranolol and is also on flexeril from her rheumatologist.  Her mother has \"essential tremor\"  The flexeril relaxes the muscles and helps the tremor but makes her tired and so it has some hang over  She is seeing a chiropractor  She had seen Dr. Marie and the botox did not work so well.     Cc: 48 year old female   Issues discussed today        Patient was asked about 14 Review of systems including changes in vision (dry eyes, double vision), hearing, heart, lungs, musculoskeletal, depression, anxiety, snoring, RBD, insomnia, urinary frequency, urinary urgency, constipation, swallowing problems, hematological, ID, allergies, skin problems: seborrhea, endocrinological: thyroid, diabetes, cholesterol; balance, weight changes, and other neurological problems and these were not significant at this time except for       Patient Active Problem List   Diagnosis     ESSENTIAL TREMOR      MIGRAINES     HYPOTHYROIDISM      TOBACCO USE DISORDER     RA (rheumatoid arthritis)     Menorrhagia     CARDIOVASCULAR SCREENING; LDL GOAL LESS THAN 160     Calculus of GB w/ other cystitis     Family history of tremor     Wears glasses     Hx of LASIK     Nutcracker esophagus     Insomnia     Thyroid disease     Cervical dystonia                  Allergies   Allergen Reactions     Sulfa Drugs Nausea     Topamax         Personality change. Memory      Plaquenil (Hydroxychloroquine Sulfate) Rash      Past Surgical History         Past Surgical History   Procedure Date     Tubal ligation 1998     Lasik 2000     Leep tx, cervical 1985     D & c x 2     Laparoscopic cholecystectomy 1/25/2012       Procedure:LAPAROSCOPIC CHOLECYSTECTOMY; Laparoscopic vs Open Cholecystectomy; Surgeon:RANDELL MCGARRY; Location:[facility]     Esophagoscopy, " gastroscopy, duodenoscopy (egd), combined 7/16/2012       Procedure: COMBINED ESOPHAGOSCOPY, GASTROSCOPY, DUODENOSCOPY (EGD);;  Surgeon: Lito Kwon MD;  Location:  GI     Arthroscopy knee rt/lt 12/8/2005       Left medial meniscal tear bilat     Hc esoph/gas reflux test w nasal imped electrode 8/23/2012       Procedure: ESOPHAGEAL IMPEDENCE FUNCTION TEST 1 HOUR OR LESS;  Surgeon: Tequila Boone MD;  Location:  GI         Past Medical History         Past Medical History   Diagnosis Date     Essential and other specified forms of tremor       Depressive disorder, not elsewhere classified         w/anxiety     Migraine, unspecified, without mention of intractable migraine without mention of status migrainosus       Unspecified hypothyroidism       Abnormal Pap smear of cervix 1985       s/p LEEP     Rheumatoid arthritis       Family history of tremor 10/11/2012     Wears glasses 10/11/2012     Hx of LASIK 10/11/2012     Nutcracker esophagus 10/11/2012     Insomnia 10/11/2012     Thyroid disease 10/11/2012     Cervical dystonia 1/29/2013         Social History   History            Social History     Marital Status: Single       Spouse Name: N/A       Number of Children: 2     Years of Education: N/A           Occupational History     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        History obtained from patient     Over 50% of this visit was spent in patient care and care coordination. Total visit times was 25 minutes     Summary and Recommendations:      Dystonic head tremor  She was unable to tolerate the topamax     She will try baclofen at 5-10mg three times a day as needed.  Rx sent to pharmacy.  Information given about the drug  She will start on 5mg initially and see if there is hangover.     Other options would be zanaflex (tizanidine) or artane (trihexyphenidyl)  website information:  [email or web address]  Cervical dystonia. Spasmodic torticollis websites as well.      She will return as needed.      Tristin Murrieta MD      Consultation      Tremor been present for years - back to her 19yrs which was more in her hand than head but now more head than hands. The head tremor has increased over the last year and has been increaed with stress and the propranolol will decrease her tremor. It varies from day to day. Alcohol helps the tremor.  She is on a betablocker that has waned in benefit. She has right head shift and does use the phone. She is resting her head backwards but this had not alleviate the shaking but does improve the pain.  When she saw Guerita and they recc a [facility] to [facility]     Family history of tremor in her mother who had hand tremor   Her daughter may have a tremor.  Her mother was Monegasque and her Jamaican grandmother had dementia     Social history, family history, " allergy, medications etc and review of systems were reviewed.      Cc: 47 year old female      14 Review of systems  are negative except for       Patient Active Problem List   Diagnoses     ESSENTIAL TREMOR      MIGRAINES     HYPOTHYROIDISM      TOBACCO USE DISORDER     RA (rheumatoid arthritis)     Menorrhagia     CARDIOVASCULAR SCREENING; LDL GOAL LESS THAN 160     Calculus of GB w/ other cystitis     Family history of tremor     Wears glasses     Hx of LASIK     Nutcracker esophagus     Insomnia     Thyroid disease              Allergies   Allergen Reactions     Sulfa Drugs Nausea     Plaquenil (Hydroxychloroquine Sulfate) Rash      Past Surgical History         Past Surgical History   Procedure Date     Tubal ligation 1998     Lasik 2000     Leep tx, cervical 1985     D & c x 2     Laparoscopic cholecystectomy 1/25/2012       Procedure:LAPAROSCOPIC CHOLECYSTECTOMY; Laparoscopic vs Open Cholecystectomy; Surgeon:RANDELL MCGARRY; Location:[facility]     Esophagoscopy, gastroscopy, duodenoscopy (egd), combined 7/16/2012       Procedure: COMBINED ESOPHAGOSCOPY, GASTROSCOPY, DUODENOSCOPY (EGD);;  Surgeon: Lito Kwon MD;  Location:  GI     Arthroscopy knee rt/lt 12/8/2005       Left medial meniscal tear bilat     Hc esoph/gas reflux test w nasal imped electrode 8/23/2012       Procedure: ESOPHAGEAL IMPEDENCE FUNCTION TEST 1 HOUR OR LESS;  Surgeon: Tequila Boone MD;  Location:  GI         Past Medical History         Past Medical History   Diagnosis Date     Essential and other specified forms of tremor       Depressive disorder, not elsewhere classified         w/anxiety     Migraine, unspecified, without mention of intractable migraine without mention of status migrainosus       Unspecified hypothyroidism       Abnormal Pap smear of cervix 1985       s/p LEEP     Rheumatoid arthritis       Family history of tremor 10/11/2012     Wears glasses 10/11/2012     Hx of LASIK 10/11/2012      "Nutcracker esophagus 10/11/2012     Insomnia 10/11/2012     Thyroid disease 10/11/2012         Social History   History            Social History     Marital Status: Single       Spouse Name: N/A       Number of Children: 2     Years of Education: N/A           Occupational History     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 Father       Diabetes Father       Hypertension Father       Stroke Father       Lipids Father       Breast CA Maternal Grandmother         age 80+      Alzheimers Maternal Grandmother         age 70+      Colon CA No family hx of       Prostatic CA No family hx of       Cancer Mother         sarcoma, small intestinal      Neurological Mother         tremor             Examination  B/P: 119/86, T: Data Unavailable, P: 62, R: Data Unavailable 140 lbs 0 oz  Blood pressure 119/86, pulse 62, height 1.651 m (5' 5\"), weight 63.504 kg (140 lb)., Body mass index is 23.30 kg/(m^2).     General " examination: well developed, nourished and normal affect  Carotid: No bruits. Chest CTA, Heart regular without gallops or murmurs. Abdomen soft nontender, no masses, bowel sounds intact. Periphery: normal pulses without edema. No skin lesions. MENTAL STATUS:  Alert, oriented x3.  Speech fluent with normal naming, repetition, comprehension.  Good right-left orientation, Can remember 3/3 objects.   CRANIAL NERVES:  Disks flat. Pupils are equal, round, reactive to light.  Normal vascularity and fields. Extraocular movements full.  Facial sensation and movement normal.  Hearing intact. Palate moves symmetrically.  Tongue midline.  Sternocleidomastoid and trapezius strength intact.  Neck strength was normal.  NEUROLOGIC:  Tone: normal. Motor in upper and lower extremities. 5/5.  Reflexes 2/4.  Toe signs downgoing.  Good finger-nose-finger, fine finger movement, heel-shin maneuver, sensation to light touch, position sense and vibration and temperature was normal. Gait normal. Romberg and postural stability - Tremor present - irregular head movement with right head shift      Outside records reviewed and revealed -  History obtained from patient     Summary and Recommendations:      Cervical dystonia with dystonic head tremor  She has minimal hand/action tremor     She is on beta blockers     She will try topamax (topiramate) with her propranolol      topiramate 25mg daily x 1wk; 25mg twice daily x 1 wk then 50mg twice daily x 1 wk then 75mg twice bruce x 1 week then 100mg twice daily  Very rare side effects are rash, eye pain, and more commonly are word finding problems and renal stones.      Call us to report back on how you are doing     Consider another treatment option if the topiramate did not work may consider a trial of primidone (mysoline) with the propranolol.     Would continue the botox depending on the response to topiramate     Return to see Eliz Kaplan in 4-8 weeks to review how you are doing.      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      Deepak Camarillo MD - 11/29/2018 10:39 AM CST  OFFICE CLINIC VISIT    Chief complaint: Tremors.     History of present illness: Ms. Neumann presents today and tells me that her tremors have continued. She does not feel that Botox is helping her enough. She says that there is some mild improvement with Botox, but does not feel that it is worth the weakness or the injections themselves. She wants to discuss other avenues of treatment.     On examination she continues to have a dystonic/essential tremor, and increased tone in bilateral sternocleidomastoid and posterior neck muscles. I do not think that she is much changed as compared to her baseline.     Assessment and plan: Ms. Neumann presents today with some frustration in terms of her tremor management. She has struggled with this for years, and has tried Botox, propranolol, Topamax and clonazepam. She did not benefit very much from Topamax, and very recently we added gabapentin to her schedule. She thinks that gabapentin produces drowsiness, and it is difficult for her to take more than 300 mg two to three times a day. She does not think that it helps control her tremor at all.     Propranolol and clonazepam have worked best for her. She also has some myofascial pain and feels that tizanidine has helped with that to some extent.     I think that at this point we should probably discontinue Botox treatments given her lack of response and frustration with injections. She has been taking propranolol on a p.r.n. basis and I think that we should make it a regular scheduled medication at 60 mg twice daily. Her blood pressure today was 100/70 with a pulse rate of 58. I have asked her to maintain a blood pressure log as she starts taking propranolol on a regular basis. I think that gabapentin should be discontinued. She should monitor her symptoms, and we will  probably either increase propranolol if her blood pressure allows, or add primidone or an alternative antiepileptic medication to her control.     I also think that she should visit with a deep brain stimulation specialist to discuss that possibility.     I will be referring her to the [facility] and the [facility] for further consultation and directional guidance.     I have discussed the above details with Ms. Neumann at great length and she expressed understanding. She seemed satisfied with this conversation, and had no further queries as of now. She has our contact information and has been advised to stay in touch with us regarding any other issues that may arise.     Thank you for allowing me to participate in Ms. Neumann's care.     _______________________________________________________________________  MD FAN Berman/max    DD: 11/27/2018 09:42:37  DT: 11/29/2018 09:39:50  Job #: [id number]      Back to top of Progress Notes  Deepak Camarillo MD - 11/23/2018 10:20 AM CST  Formatting of this note may be different from the original.  Patient Instructions   1. We will discontinue BOTOX treatments.   2. Discuss Deep Brain stimulation with specialists.   3. Take propranolol at 60 mg twice daily. Maintain BP log.  4. Discontinue Gabapentin.   5. Monitor symptoms. We may need to consider primidone in the future.  6. Please keep a log of any benefits/ side effects and schedule a follow up in 2m.     This note has been dictated. The encounter number is [phone]. A total time of 28 minutes spent with the patient, more than 50% of the time was spent counseling/education.         Back to top of Progress Notes      Nursing Notes  - in this encounter  Aubrie Bond - 11/23/2018 10:20 AM CST  Formatting of this note may be different from the original.  Allergies:  Methotrexate; Sulfasalazine; Topiramate; and Hydroxychloroquine sulfate  Current Outpatient Prescriptions   Medication Sig Dispense Refill      azaTHIOprine (IMURAN) 50 mg tablet Take 2 tablets by mouth once daily. 180 tablet 1     celecoxib (CELEBREX) 100 mg capsule     cholecalciferol (VITAMIN D) 1,000 unit tablet Take 1 tablet by mouth.     clonazePAM (KLONOPIN) 0.5 mg tablet TAKE 1 TABLET AT BEDTIME 30 tablet 1     gabapentin (NEURONTIN) 300 mg capsule TAKE 1 CAPSULE BY MOUTH 3 TIMES DAILY. 270 capsule 1     ketoconazole 2% topical (NIZORAL) cream     levothyroxine (SYNTHROID) 150 mcg tablet Take 150 mcg by mouth once daily. 2     methocarbamol (ROBAXIN) 500 mg tablet Take 1,000 mg by mouth.     nicotine 14 mg/24 hr (NICODERM; HABITROL) 14 mg/24 hr patch PLACE 1 PATCH ONTO THE SKIN EVERY 24 HOURS 3     ORENCIA subcutaneous syringe INJECT ONE SYRINGE (125 MG) SUBCUTANEOUSLY EVERY WEEK. REFRIGERATE. ALLOW TO WARM TO ROOM TEMPERATURE PRIOR TO ADMINISTRATION. 4 Syringe 5     propranolol (INDERAL) 60 mg tablet Take 1 tablet by mouth 2 times daily. 60 tablet 0     tiZANidine (ZANAFLEX) 2 mg tablet As needed     No current facility-administered medications for this visit.     Medications have been reviewed by me and are current to the best of my knowledge and ability.    Past Medical History:   Diagnosis Date     Cervical dystonia     Hypothyroidism     Rheumatoid arthritis(714.0)     Tremor, essential     Family History   Problem Relation Age of Onset     Cancer Mother     Heart attack Father     Cancer-breast Maternal Grandmother     Stroke Paternal Grandmother     Social History   Substance Use Topics     Smoking status: Current Every Day Smoker   Packs/day: 0.50     Smokeless tobacco: Never Used   Comment: aprox. 3 a day     Alcohol use Yes   Comment: a little bit     Brief Presenting History: Patient presents today for a follow up to discuss medication management for tremors. She states she has not been taking propranolol on a daily basis due to fatigue. She would like to discuss going off the medication. She also states she would like to discontinue  Botox injections. She states her pain was well controlled with Botox however her tremor seemed to be worse and she had swallowing issues after injections each time.  Aubrie Bond LPN .................11/23/2018 11:10 AM       COMPLETE REVIEW OF SYSTEMS:   General: Denies general constitutional problems  Eyes: Denies problems  Ears/Nose/Throat: Denies problems  Cardiovascular: Denies problems  Respiratory: Denies problems  Gastrointestinal: Denies problems  Musculoskeletal: NEGATIVE except for: neck pain, joint pain  Skin: Denies problems  Endocrine: Denies problems  Heme/Lymphatic: Denies problems  Allergic/Immunologic: Denies problems               n, thank you for allowing me to participate in the care of your patient.      Sincerely,    Tristin Murrieta MD

## 2019-01-11 ENCOUNTER — PATIENT OUTREACH (OUTPATIENT)
Dept: NEUROSURGERY | Facility: CLINIC | Age: 54
End: 2019-01-11

## 2019-01-11 ENCOUNTER — TELEPHONE (OUTPATIENT)
Dept: NEUROLOGY | Facility: CLINIC | Age: 54
End: 2019-01-11

## 2019-01-11 NOTE — TELEPHONE ENCOUNTER
GENETIC COUNSELING-Neurology  I left a message for Tequila at the request of Dr. Tristin Murrieta to see if she would like to schedule a genetic consultation. I left my contact information for her.    Moreno Hamlin MS, [facility]  Licensed Genetic Counselor

## 2019-01-11 NOTE — PROGRESS NOTES
Per Dr. Murrieta, called pt to invite her to our deep brain stimulation class on 2/2/19 at 10:00. Pt would like to attend. Will send a letter with all pertinent information. No further questions at this time.

## 2019-01-18 ENCOUNTER — ALLIED HEALTH/NURSE VISIT (OUTPATIENT)
Dept: PHARMACY | Facility: CLINIC | Age: 54
End: 2019-01-18
Payer: COMMERCIAL

## 2019-01-18 DIAGNOSIS — G25.2 DYSTONIC TREMOR: ICD-10-CM

## 2019-01-18 DIAGNOSIS — F17.200 TOBACCO USE DISORDER: ICD-10-CM

## 2019-01-18 DIAGNOSIS — G47.00 INSOMNIA, UNSPECIFIED TYPE: ICD-10-CM

## 2019-01-18 DIAGNOSIS — M06.9 RHEUMATOID ARTHRITIS, INVOLVING UNSPECIFIED SITE, UNSPECIFIED RHEUMATOID FACTOR PRESENCE: ICD-10-CM

## 2019-01-18 DIAGNOSIS — G24.3 CERVICAL DYSTONIA: Primary | ICD-10-CM

## 2019-01-18 DIAGNOSIS — E55.9 VITAMIN D DEFICIENCY: ICD-10-CM

## 2019-01-18 DIAGNOSIS — E03.9 HYPOTHYROIDISM, UNSPECIFIED TYPE: ICD-10-CM

## 2019-01-18 DIAGNOSIS — G24.3 SPASMODIC TORTICOLLIS: ICD-10-CM

## 2019-01-18 DIAGNOSIS — R25.1 TREMOR: ICD-10-CM

## 2019-01-18 PROCEDURE — 99607 MTMS BY PHARM ADDL 15 MIN: CPT | Performed by: PHARMACIST

## 2019-01-18 PROCEDURE — 99605 MTMS BY PHARM NP 15 MIN: CPT | Performed by: PHARMACIST

## 2019-01-18 NOTE — PROGRESS NOTES
SUBJECTIVE/OBJECTIVE:                           Tequila Neumann is a 53 year old female called for an initial visit for Medication Therapy Management.  She was referred to me from Dr. Murrieta.    Chief Complaint: Initial MT visit     Allergies/ADRs: Reviewed in Epic  Tobacco: 0-1 pack per day - is interested in quitting; 5-6 cigarettes/day - has Chantix at home but not taking it  Alcohol: 1-3 beverages / week; doesn't sleep as well with alcohol but improves tremor  Caffeine: no caffeine and sugars; knows it makes her tremor worse  Activity: cancelled gym membership recently (hoping to restart soon); she works with her horses regularly  PMH: Reviewed in Epic    Medication Adherence/Access: no issues reported - pharmacy [facility]     Cervical dystonia/dystonic tremor: Takes propranolol 60 mg twice daily at 7 am and nighttime. She has cut out clonazepam recently; states she weaned herself off of it because she was concerned about dependence. Takes methocarbamal 500 mg once daily after work; states it does help with neck pain and wonders if she can take more than one tablet/day (has never tried this). If she takes it too late at night before bedtime it interferes with her sleep. Tremor is primarily in her head/neck; states it has gotten worse lately and this is her primary concern versus the pain. Per [facility] records, she has been on higher doses of propranolol (ie: 120 mg AM and 60 mg PM) in the past but there was concern for her blood pressure/pulse lowering too much with higher doses. She has taken propranolol for maybe 10 years. Patient has done physical therapy and states this is very helpful for stiffness and pain in her neck/back and has been unable to make time lately for this. Of note, it does not appear as though she has tried Artane or other anticholinergic medications in the past. Patient is interested in deep brain stimulation and will be attending the class next month.    Previous therapies for  "dystonia/tremor:   Botox - had mixed results; seemed like it weakened her muscles too much - tremor got worse and difficulty swallowing; wasn't happy with it at all   Gabapentin - mild drowsiness with doses > 900 mg/day per neurologist notes but patient denies this; states she was told she couldn't take it with propranolol   Topiramate - patient does not know the effect this medication had but per [facility] records she had personality changes when she took it in 2013  Tizanidine/cyclobenzaprine/baclofen - methocarbamol has been the most effective of the muscle relaxers; not a significant effect from the others  Acupuncture - may have helped a little     Insomnia: Not currently taking any medications.  She was previously taking clonazepam but has recently tapered off of it.  She had discussed with Dr. Murrieta considering a sedating antidepressant to help with sleep.  She is unsure which medication he was referring to.  Currently she is trying to read more at night to help with insomnia but has not tried meditation or other techniques.    Seronegative rheumatoid arthritis: Takes azathioprine 100 mg daily and abatacept 125 mg subcutaneous weekly.  States this regimen has worked well for her.    Vitamin D deficiency: Takes 1000 units daily. Last Vitamin D level was 40 micrograms/L in May 2017.     Tobacco use: Not currently taking any medications to quit smoking.  States that she has a Chantix prescription at home and she took one dose but states it made her feel \"weird.\"  She is concerned about Chantix interfering with her sleep or causing her night terrors.  She has used a nicotine patch in the past and states that it helps take the edge off but has not been effective and completely helping her quit.  She tried Wellbutrin in the past centimeter tremor worse.  Patient states she would like to quit smoking but right now her tremor, insomnia, and pain are more her priorities.    Hypothyroidism: Patient is taking levothyroxine " 150 mcg daily at noon. Patient is having the following symptoms: none.  She was unaware of the interaction between levothyroxine and food and has consistently been taking it with her lunch.  TSH   Date Value Ref Range Status   10/12/2018 6.81 (H) 0.40 - 4.00 mU/L Final     T4 Free   Date Value Ref Range Status   10/12/2018 1.16 0.76 - 1.46 ng/dL Final     Last vitals:   BP Readings from Last 1 Encounters:   01/10/19 117/64     Pulse Readings from Last 1 Encounters:   01/10/19 67       ASSESSMENT:                             Current medications were reviewed today.     Medication Adherence: excellent, no issues identified    Cervical dystonia/dystonic tremor: Needs improvement.  Patient has tried a number of medications for dystonia and tremor and she is currently pursuing deep brain stimulation but if there are other medications available she would like to try them first.  For her tremor, primidone may be a good medication to augment with propranolol.  The only drug interaction of note is that given with methocarbamol can cause CNS depression so we will need to monitor this.  For pain, methocarbamol seems to be working well so we will have her continue this as needed but encouraged her to just use it at night when she is at home rather than at work.  She has also never been on any anticholinergics so this could be considered in the future if primidone is not effective.  She would likely also benefit from restarting exercise regimen and pursuing physical therapy again.    Insomnia: Needs improvement.  It is possible that Dr. Murrieta had recommended mirtazapine to manage her insomnia.  However, patient is concerned about weight gain with this medication and therefore we will not start it today.  Her primary concern is tremor so we will focus on trying a different medication for tremor and using nonpharmacologic treatments for insomnia, including a meditation juan j.    Seronegative rheumatoid arthritis: Stable.    Vitamin  D deficiency: Stable.    Tobacco use: Needs improvement.  Discussed the risks of Chantix including night terrors but explained that she may not experience the side effects and may benefit from a longer trial.  We will discuss this at future visits.    Hypothyroidism: Stable. Although patient's TSH was elevated in October, her T4 was normal.  She may benefit from taking levothyroxine in the morning with the best absorption of this medication.    PLAN:                            1.  Consider starting a trial of primidone.  Will discuss with Dr. Murrieta.   2.  Consider using the Calm juan j for meditation and to help with insomnia.  3.  Encouraged patient to get back into her exercise regimen and pursue physical therapy again.  Addendum 1/23/19: Dr. Murrieta agreed that we can try primidone at 25 mg nightly with weekly 25 mg dose increase.     New medication schedule:   AM - levothyroxine, propranolol, azathioprine, primidone  PM - propranolol, methocarbamol, Vitamin D    I spent 45 minutes with this patient today. All changes were made via verbal approval with Dr. Murrieta. A copy of the visit note was provided to the patient's referring provider.    Will follow up in one month: 2/15/19.    The patient will be sent AVS via [employer] after discussing plan with Dr. Murrieta.     Chart documentation was completed in part with Dragon voice-recognition software. Even though reviewed, some grammatical, spelling, and word errors may remain.    Babs Harvey, Pharm.D.  Medication Therapy Management Pharmacist  Phone: [phone]

## 2019-01-18 NOTE — Clinical Note
She has tremor/dystonia. Tremor is her primary concern. She is going to go to the DBS class but is still interested in any medication treatments. She seems interested in trying primidone. What do you think? What dose would you start with?

## 2019-01-18 NOTE — PATIENT INSTRUCTIONS
Recommendations from today's MTM visit:                                                      1. Look into using the Calm juan j for meditation and to help with sleep.   2. Get back into exercise and consider doing PT again!  3. I'll let you know what Dr. Murrieta thinks of primidone for your tremor.    Next MTM visit: 2/15/19 at 8:30 am (I will call you)    To schedule another MTM appointment, please call the clinic directly or you may call the MTM scheduling line at [phone] or toll-free at [phone].     My Clinical Pharmacist's contact information:                                                      It was a pleasure talking with you today!  Please feel free to contact me with any questions or concerns you have.      Babs Harvey, Pharm.D.  Medication Therapy Management Pharmacist  Phone: [phone]    You may receive a survey about the MTM services you received.  I would appreciate your feedback to help me serve you better in the future. Please fill it out and return it when you can. Your comments will be anonymous.

## 2019-01-23 RX ORDER — PRIMIDONE 50 MG/1
TABLET ORAL
Qty: 60 TABLET | Refills: 11 | Status: SHIPPED | OUTPATIENT
Start: 2019-01-23 | End: 2019-07-05

## 2019-01-28 ENCOUNTER — TELEPHONE (OUTPATIENT)
Dept: NEUROLOGY | Facility: CLINIC | Age: 54
End: 2019-01-28

## 2019-01-28 NOTE — TELEPHONE ENCOUNTER
Health Call Center    Phone Message    May a detailed message be left on voicemail: yes    Reason for Call: Other: Norma from [facility] at the [address] [place] [address] reported that they sent an electronic request on 1/23/19 regarding a potential drug interaction. Please call back TODAY, ask for pharmacist Pablo, at [phone] regarding Rx primidone (MYSOLINE) 50 MG tablet by dr. Tristin Murrieta.  Thank you!     Action Taken: Message routed to:  [facility] ([facility]):  neurology clinic

## 2019-01-28 NOTE — TELEPHONE ENCOUNTER
Spoke with pharmacist, Pablo. He states that there was a concern for a drug interaction between primidone and propranolol that primidone may reduce the bioavailability of propranolol. Because she is taking both for termor, this effect may be negligible and we could always consider increasing the propranolol if needed. This is not a listed drug interaction in [employer] but did appear in his Clinical Pharmacology system. I recommended that he dispense these medications in combination and he agreed.    Babs Harvey, Pharm.D.  Medication Therapy Management Pharmacist  Phone: [phone]

## 2019-02-14 ENCOUNTER — TELEPHONE (OUTPATIENT)
Dept: FAMILY MEDICINE | Facility: CLINIC | Age: 54
End: 2019-02-14

## 2019-02-14 NOTE — TELEPHONE ENCOUNTER
Panel Management Review      Patient has the following on her problem list: None      Composite cancer screening  Chart review shows that this patient is due/due soon for the following Mammogram  Summary:    Patient is due/failing the following:   MAMMOGRAM    Action needed:   Patient needs to schedule a mammogram     Type of outreach:    Sent letter.    Questions for provider review:    None                                                                                                                                    Zainab Garcia

## 2019-02-14 NOTE — LETTER
February 14, 2019      Tequila Neumann  [address]  [place] [address]          Dear Tequila Neumann,     At [facility] we care about your health and are committed to providing quality patient care, which includes staying current on preventative cancer screenings.  You can increase your chances of finding and treating cancers through regular screenings.      Our records show that you are due for the following screening(s):      Mammogram for breast cancer - [phone] to schedule  Recommended every 1-2 years for women age 50 and older  Mammograms help detect breast cancer, which is the most common cancer among women in the [place].  You may need to start having mammograms earlier and more often if you have had breast cancer, breast problems, or a family history of breast cancer.     If you have a My-Chart Account, you also can schedule this appointment through there.    If you have already had one or all of the above screening tests at another facility, please call us so that we may update your chart.      Your partners in health,      Quality Committee   [facility]

## 2019-02-18 ENCOUNTER — TELEPHONE (OUTPATIENT)
Dept: FAMILY MEDICINE | Facility: CLINIC | Age: 54
End: 2019-02-18

## 2019-02-18 DIAGNOSIS — E03.9 HYPOTHYROIDISM, UNSPECIFIED TYPE: Primary | ICD-10-CM

## 2019-02-18 NOTE — TELEPHONE ENCOUNTER
Reason for Call:  Other orders    Detailed comments: Patient is asking for thyroid labs please.    Phone Number Patient can be reached at: Home number on file [phone] (home)    Best Time: any    Can we leave a detailed message on this number? YES    Call taken on 2/18/2019 at 8:59 AM by Melinda Howard

## 2019-02-18 NOTE — TELEPHONE ENCOUNTER
Patient is requesting thyroid labs. Last done 10/12/18  TSH   Date Value Ref Range Status   10/12/2018 6.81 (H) 0.40 - 4.00 mU/L Final     T4 Free   Date Value Ref Range Status   10/12/2018 1.16 0.76 - 1.46 ng/dL Final     LOV Ruby 10/12/18:    Notes Recorded by Emma Beltran APRN CNP on 10/12/2018 at 12:45 PM  Thyroid function improved, T 4 level normal, continue Levothyroxine 150 mcg daily     RADHA Ernandez CNP     JAYLEEN CrawfordN, RN

## 2019-02-19 DIAGNOSIS — E03.9 HYPOTHYROIDISM, UNSPECIFIED TYPE: ICD-10-CM

## 2019-02-19 LAB
T4 FREE SERPL-MCNC: 1.33 NG/DL (ref 0.76–1.46)
TSH SERPL DL<=0.005 MIU/L-ACNC: 7.57 MU/L (ref 0.4–4)

## 2019-02-19 PROCEDURE — 84439 ASSAY OF FREE THYROXINE: CPT | Performed by: NURSE PRACTITIONER

## 2019-02-19 PROCEDURE — 84443 ASSAY THYROID STIM HORMONE: CPT | Performed by: NURSE PRACTITIONER

## 2019-02-19 PROCEDURE — 36415 COLL VENOUS BLD VENIPUNCTURE: CPT | Performed by: NURSE PRACTITIONER

## 2019-02-20 ENCOUNTER — TELEPHONE (OUTPATIENT)
Dept: NEUROSURGERY | Facility: CLINIC | Age: 54
End: 2019-02-20

## 2019-02-20 NOTE — TELEPHONE ENCOUNTER
Pt called to say she received the message from Kim Gilbert that we are canceling the DBS class d/t the fact that 2 of the instructors are unable to be here because of the severe weather. I will send pt a letter with an alternate date (April 17). No further questions.

## 2019-02-26 ENCOUNTER — TELEPHONE (OUTPATIENT)
Dept: FAMILY MEDICINE | Facility: CLINIC | Age: 54
End: 2019-02-26

## 2019-02-26 DIAGNOSIS — E03.9 HYPOTHYROIDISM, UNSPECIFIED TYPE: Primary | ICD-10-CM

## 2019-02-26 NOTE — TELEPHONE ENCOUNTER
Results were discussed with patient about thyroid labs and dosage, she is wondering when she needs to her labs done for her thyroid again, please advise     Thank you     Zainab Garcia MA

## 2019-02-27 NOTE — TELEPHONE ENCOUNTER
Since levels were slightly off, TSH was slightly elevated, recommend to repeat in 2 months again, order in.    RADHA Ernandez CNP

## 2019-04-30 ENCOUNTER — TELEPHONE (OUTPATIENT)
Dept: OBGYN | Facility: CLINIC | Age: 54
End: 2019-04-30

## 2019-04-30 DIAGNOSIS — R87.610 ASCUS WITH POSITIVE HIGH RISK HPV CERVICAL: ICD-10-CM

## 2019-04-30 DIAGNOSIS — R87.810 ASCUS WITH POSITIVE HIGH RISK HPV CERVICAL: ICD-10-CM

## 2019-04-30 NOTE — TELEPHONE ENCOUNTER
Pt is past due for Pap follow up  Reminder letter has been sent  LMTC her clinic with any questions or to schedule    Sonya Curiel,   Pap Tracking

## 2019-05-10 ENCOUNTER — TELEPHONE (OUTPATIENT)
Dept: OBGYN | Facility: CLINIC | Age: 54
End: 2019-05-10

## 2019-07-05 ENCOUNTER — OFFICE VISIT (OUTPATIENT)
Dept: FAMILY MEDICINE | Facility: CLINIC | Age: 54
End: 2019-07-05
Payer: COMMERCIAL

## 2019-07-05 VITALS
DIASTOLIC BLOOD PRESSURE: 66 MMHG | OXYGEN SATURATION: 96 % | WEIGHT: 149.8 LBS | SYSTOLIC BLOOD PRESSURE: 94 MMHG | BODY MASS INDEX: 24.74 KG/M2 | HEART RATE: 72 BPM | RESPIRATION RATE: 12 BRPM | TEMPERATURE: 98.6 F

## 2019-07-05 DIAGNOSIS — E03.9 HYPOTHYROIDISM, UNSPECIFIED TYPE: Primary | ICD-10-CM

## 2019-07-05 DIAGNOSIS — G89.4 CHRONIC PAIN SYNDROME: ICD-10-CM

## 2019-07-05 DIAGNOSIS — M79.7 FIBROMYALGIA: ICD-10-CM

## 2019-07-05 PROBLEM — Z87.19 PERSONAL HISTORY OF OTHER DISEASES OF THE DIGESTIVE SYSTEM: Status: ACTIVE | Noted: 2019-06-10

## 2019-07-05 PROBLEM — F41.9 ANXIETY: Status: ACTIVE | Noted: 2019-06-10

## 2019-07-05 PROBLEM — G25.0 ESSENTIAL TREMOR: Status: ACTIVE | Noted: 2019-06-10

## 2019-07-05 PROBLEM — F32.1 MAJOR DEPRESSIVE DISORDER, SINGLE EPISODE, MODERATE (H): Status: ACTIVE | Noted: 2019-06-10

## 2019-07-05 PROBLEM — F17.210 TOBACCO DEPENDENCE DUE TO CIGARETTES: Status: ACTIVE | Noted: 2019-06-10

## 2019-07-05 PROCEDURE — 99214 OFFICE O/P EST MOD 30 MIN: CPT | Performed by: NURSE PRACTITIONER

## 2019-07-05 RX ORDER — NADOLOL 40 MG/1
TABLET ORAL
COMMUNITY
Start: 2019-06-27 | End: 2019-11-01

## 2019-07-05 RX ORDER — GABAPENTIN 100 MG/1
400 CAPSULE ORAL AT BEDTIME
COMMUNITY
Start: 2019-06-24 | End: 2019-11-01

## 2019-07-05 RX ORDER — LEVOTHYROXINE SODIUM 75 UG/1
75 TABLET ORAL DAILY
Qty: 30 TABLET | Refills: 1 | Status: SHIPPED | OUTPATIENT
Start: 2019-07-05 | End: 2019-07-29

## 2019-07-05 RX ORDER — TRAZODONE HYDROCHLORIDE 50 MG/1
50 TABLET, FILM COATED ORAL
COMMUNITY
Start: 2019-06-14 | End: 2020-04-24

## 2019-07-05 NOTE — NURSING NOTE
"Initial BP 94/66   Pulse 72   Temp 98.6  F (37  C) (Tympanic)   Resp 12   Wt 67.9 kg (149 lb 12.8 oz)   LMP 02/20/2015 (Exact Date)   SpO2 96%   BMI 24.74 kg/m   Estimated body mass index is 24.74 kg/m  as calculated from the following:    Height as of 10/12/18: 1.657 m (5' 5.25\").    Weight as of this encounter: 67.9 kg (149 lb 12.8 oz). .      "

## 2019-07-05 NOTE — PROGRESS NOTES
Subjective     Tequila Neumann is a 54 year old female who presents to clinic today for the following health issues: establish care. Follow up from [facility]. The patient reports that she has comprehensive care program at her work for healthcare and was referred to [facility] for evaluation, was there for 2 weeks, saw neurologist, rheumatologist and Gi doctor. Was told that she does not have RA and recommended to stop Orencia and Imuran, was advised that her chronic pain is due to fibromyalgia. Tested positive for Celiac, recommended EGD with biopsy for additional evaluation. Neurologist started patient on Nadolol for essential tremors, adjusted Synthroid to 75 mcg daily since 150 mcg daily was causing more tremors, she started new dose 3 weeks ago and needs follow up labs.  The patient is also asking for referrals to [facility] in [place] for physical therapy and psychologist, she would like to see Melissa Pavon, PhD who is helping patients with chronic pain. The patient also would like to have referral to the same clinic for physical therapy.     HPI       Chief Complaint   Patient presents with     [facility]     Referral     Requesting referral to psychiatrist (lenin Vincent [facility] for chronic pain and cognitive issue. Physical therapy for fibromyalgia. Past Pt of the [place]. Has thumb drive with records with her today.     Reviewed and updated as needed this visit by Provider         Review of Systems   ROS COMP: Constitutional, HEENT, cardiovascular, pulmonary, gi and gu systems are negative, except as otherwise noted.      Objective    BP 94/66   Pulse 72   Temp 98.6  F (37  C) (Tympanic)   Resp 12   Wt 67.9 kg (149 lb 12.8 oz)   LMP 02/20/2015 (Exact Date)   SpO2 96%   BMI 24.74 kg/m    Body mass index is 24.74 kg/m .  Physical Exam   GENERAL: healthy, alert and no distress  NEURO: Normal strength and tone, mentation intact and speech normal  PSYCH:  mentation appears normal, affect normal/bright    Diagnostic Test Results:  Labs reviewed in Epic  Recheck thyroid level in 1-2 weeks         Assessment & Plan     1. Hypothyroidism, unspecified type  -clinically euthyroid, started new Synthroid dose at 75 mcg 3 weeks ago, recommended to recheck thyroid labs again in 1-2 weeks, her TSH level about 3 weeks ago was 56 with borderline normal T 4 at 0.7  - TSH; Future  - T4, free; Future  - levothyroxine (SYNTHROID/LEVOTHROID) 75 MCG tablet; Take 1 tablet (75 mcg) by mouth daily  Dispense: 30 tablet; Refill: 1    2. Fibromyalgia  - PSYCHOLOGY REFERRAL  - PHYSICAL THERAPY REFERRAL; Future    3. Chronic pain syndrome  - PSYCHOLOGY REFERRAL  - PHYSICAL THERAPY REFERRAL; Future     See Patient Instructions    Return in about 1 week (around 7/12/2019) for Lab Work.    RADHA Ernandez [facility]

## 2019-07-29 DIAGNOSIS — E03.9 HYPOTHYROIDISM, UNSPECIFIED TYPE: ICD-10-CM

## 2019-07-29 LAB
T4 FREE SERPL-MCNC: 0.7 NG/DL (ref 0.76–1.46)
TSH SERPL DL<=0.005 MIU/L-ACNC: 49.22 MU/L (ref 0.4–4)
TSH SERPL DL<=0.005 MIU/L-ACNC: 49.22 MU/L (ref 0.4–4)

## 2019-07-29 PROCEDURE — 36415 COLL VENOUS BLD VENIPUNCTURE: CPT | Performed by: NURSE PRACTITIONER

## 2019-07-29 PROCEDURE — 84443 ASSAY THYROID STIM HORMONE: CPT | Performed by: NURSE PRACTITIONER

## 2019-07-29 PROCEDURE — 84439 ASSAY OF FREE THYROXINE: CPT | Performed by: NURSE PRACTITIONER

## 2019-07-29 RX ORDER — LEVOTHYROXINE SODIUM 88 UG/1
88 TABLET ORAL DAILY
Qty: 30 TABLET | Refills: 1 | Status: SHIPPED | OUTPATIENT
Start: 2019-07-29 | End: 2019-08-23

## 2019-08-01 DIAGNOSIS — E03.9 HYPOTHYROIDISM, UNSPECIFIED TYPE: ICD-10-CM

## 2019-08-01 RX ORDER — LEVOTHYROXINE SODIUM 75 UG/1
TABLET ORAL
Qty: 30 TABLET | Refills: 1 | OUTPATIENT
Start: 2019-08-01

## 2019-08-01 NOTE — TELEPHONE ENCOUNTER
"Requested Prescriptions   Pending Prescriptions Disp Refills     levothyroxine (SYNTHROID/LEVOTHROID) 75 MCG tablet [Pharmacy Med Name: LEVOTHYROXINE 75 MCG TABLET] 30 tablet 1     Sig: TAKE 1 TABLET BY MOUTH EVERY DAY       Thyroid Protocol Failed - 8/1/2019  7:33 AM        Failed - Normal TSH on file in past 12 months     Recent Labs   Lab Test 07/29/19  1623   TSH 49.22*  49.22*              Passed - Patient is 12 years or older        Passed - Recent (12 mo) or future (30 days) visit within the authorizing provider's specialty     Patient had office visit in the last 12 months or has a visit in the next 30 days with authorizing provider or within the authorizing provider's specialty.  See \"Patient Info\" tab in inbasket, or \"Choose Columns\" in Meds & Orders section of the refill encounter.              Passed - Medication is active on med list        Passed - No active pregnancy on record     If patient is pregnant or has had a positive pregnancy test, please check TSH.          Passed - No positive pregnancy test in past 12 months     If patient is pregnant or has had a positive pregnancy test, please check TSH.          Last Written Prescription Date:  7/29/19  Last Fill Quantity: 30,  # refills: 1   Last office visit: 7/5/2019 with prescribing provider:  Ruby   Future Office Visit:      "

## 2019-08-14 ENCOUNTER — TELEPHONE (OUTPATIENT)
Dept: FAMILY MEDICINE | Facility: CLINIC | Age: 54
End: 2019-08-14

## 2019-08-14 NOTE — LETTER
August 14, 2019      Tequila Neumann  [address] [place] [address]        Dear Tequila Neumann,     At [facility] we care about your health and are committed to providing quality patient care, which includes staying current on preventative cancer screenings.  You can increase your chances of finding and treating cancers through regular screenings.      Our records show that you are due for the following screening(s):      Colonoscopy for colon cancer - Call [phone] to schedule   Recommended every ten years for everyone age 50 and older  Please take a moment to read over the enclosed information packet about colon cancer screening.   We strongly urge our patient's to consider having a colonoscopy done, which is the best screening test available and only needs to be done every 10 years if normal.      Mammogram for breast cancer - [phone] to schedule  Recommended every 1-2 years for women age 50 and older  Mammograms help detect breast cancer, which is the most common cancer among women in the [place].  You may need to start having mammograms earlier and more often if you have had breast cancer, breast problems, or a family history of breast cancer.     Pap Smear for cervical cancer - [phone] to schedule  Recommended every three years for women 21 and older  A Pap test is used to detect cervical cancer.  The test should be taken at least once every three years but women who are at a greater risk for cervical cancer may need to have the test more often.      You are at a greater risk for cervical cancer if:   - You have had a sexually transmitted disease   - You have had more than one sex partner   - You have had an abnormal pap test in the past    If you have a My-Chart Account, you also can schedule this appointment through there.    If you have already had one or all of the above screening tests at another facility, please call us so that we may update your chart.       Your partners in health,      Quality Committee   [facility]

## 2019-08-14 NOTE — TELEPHONE ENCOUNTER
Panel Management Review      Patient has the following on her problem list: None      Composite cancer screening  Chart review shows that this patient is due/due soon for the following Pap Smear, Mammogram, Colonoscopy and Fecal Colorectal (FIT)  Summary:    Patient is due/failing the following:   COLONOSCOPY, FIT, MAMMOGRAM and PAP    Action needed:   Patient needs referral/order: PE with pap. Needs to schedule a mammogram and colonoscopy/ fit test     Type of outreach:    Sent letter.    Questions for provider review:    None                                                                                                                                    Zainab Garcia

## 2019-08-23 DIAGNOSIS — E03.9 HYPOTHYROIDISM, UNSPECIFIED TYPE: ICD-10-CM

## 2019-08-23 LAB
T4 FREE SERPL-MCNC: 0.89 NG/DL (ref 0.76–1.46)
TSH SERPL DL<=0.005 MIU/L-ACNC: 33.44 MU/L (ref 0.4–4)

## 2019-08-23 PROCEDURE — 84443 ASSAY THYROID STIM HORMONE: CPT | Performed by: NURSE PRACTITIONER

## 2019-08-23 PROCEDURE — 84439 ASSAY OF FREE THYROXINE: CPT | Performed by: NURSE PRACTITIONER

## 2019-08-23 PROCEDURE — 36415 COLL VENOUS BLD VENIPUNCTURE: CPT | Performed by: NURSE PRACTITIONER

## 2019-08-23 RX ORDER — LEVOTHYROXINE SODIUM 100 UG/1
100 TABLET ORAL DAILY
Qty: 30 TABLET | Refills: 2 | Status: SHIPPED | OUTPATIENT
Start: 2019-08-23 | End: 2019-10-23

## 2019-09-17 DIAGNOSIS — E03.9 HYPOTHYROIDISM, UNSPECIFIED TYPE: ICD-10-CM

## 2019-09-17 RX ORDER — LEVOTHYROXINE SODIUM 100 UG/1
TABLET ORAL
Qty: 30 TABLET | Refills: 2 | OUTPATIENT
Start: 2019-09-17

## 2019-09-17 NOTE — TELEPHONE ENCOUNTER
S:  Refill request for levothyroxine 100mcg daily [facility]    B:  LOV 8/23/19  Levothyroxine 100mcg daily last written 8/23/19 for 3 month supply sent to [facility]     A:  This is a duplicate order   Current Rx sent on 8/23/19    R:  Refused per duplicate    No further action necessary.  Encounter closed.    Christophe Alfonso RN

## 2019-09-25 DIAGNOSIS — E03.9 HYPOTHYROIDISM, UNSPECIFIED TYPE: ICD-10-CM

## 2019-09-25 LAB
T4 FREE SERPL-MCNC: 1.07 NG/DL (ref 0.76–1.46)
TSH SERPL DL<=0.005 MIU/L-ACNC: 27.33 MU/L (ref 0.4–4)

## 2019-09-25 PROCEDURE — 84439 ASSAY OF FREE THYROXINE: CPT | Performed by: NURSE PRACTITIONER

## 2019-09-25 PROCEDURE — 84443 ASSAY THYROID STIM HORMONE: CPT | Performed by: NURSE PRACTITIONER

## 2019-09-25 PROCEDURE — 36415 COLL VENOUS BLD VENIPUNCTURE: CPT | Performed by: NURSE PRACTITIONER

## 2019-10-23 DIAGNOSIS — E03.9 HYPOTHYROIDISM, UNSPECIFIED TYPE: ICD-10-CM

## 2019-10-23 NOTE — TELEPHONE ENCOUNTER
"Requested Prescriptions   Pending Prescriptions Disp Refills     levothyroxine (SYNTHROID/LEVOTHROID) 100 MCG tablet [Pharmacy Med Name: LEVOTHYROXINE 100 MCG TABLET] 30 tablet 2     Sig: TAKE 1 TABLET BY MOUTH EVERY DAY       Thyroid Protocol Failed - 10/23/2019 10:33 AM        Failed - Normal TSH on file in past 12 months     Recent Labs   Lab Test 09/25/19  1553   TSH 27.33*              Passed - Patient is 12 years or older        Passed - Recent (12 mo) or future (30 days) visit within the authorizing provider's specialty     Patient has had an office visit with the authorizing provider or a provider within the authorizing providers department within the previous 12 mos or has a future within next 30 days. See \"Patient Info\" tab in inbasket, or \"Choose Columns\" in Meds & Orders section of the refill encounter.              Passed - Medication is active on med list        Passed - No active pregnancy on record     If patient is pregnant or has had a positive pregnancy test, please check TSH.          Passed - No positive pregnancy test in past 12 months     If patient is pregnant or has had a positive pregnancy test, please check TSH.          Last Written Prescription Date:  8/23/19  Last Fill Quantity: 30,  # refills: 2   Last office visit: 7/5/2019 with prescribing provider:  Ruby   Future Office Visit:   Next 5 appointments (look out 90 days)    Nov 01, 2019 10:00 AM CDT  [facility] with RADHA Saez CNP  [facility]  Arrive at: Clinic [facility] [address] [address]  [phone]   Nov 13, 2019  3:00 PM CST  PHYSICAL with Carmenza Hooks MD  [facility] [address] [address]  [phone]           "

## 2019-10-24 RX ORDER — LEVOTHYROXINE SODIUM 100 UG/1
TABLET ORAL
Qty: 30 TABLET | Refills: 1 | Status: SHIPPED | OUTPATIENT
Start: 2019-10-24 | End: 2019-11-01

## 2019-10-24 NOTE — TELEPHONE ENCOUNTER
Refilled for 2 months, I recommended patient follow up when I sent her last thyroid results.     RADHA Ernandez CNP

## 2019-10-27 ENCOUNTER — APPOINTMENT (OUTPATIENT)
Dept: GENERAL RADIOLOGY | Facility: CLINIC | Age: 54
End: 2019-10-27
Attending: PHYSICIAN ASSISTANT
Payer: COMMERCIAL

## 2019-10-27 ENCOUNTER — NURSE TRIAGE (OUTPATIENT)
Dept: NURSING | Facility: CLINIC | Age: 54
End: 2019-10-27

## 2019-10-27 ENCOUNTER — HOSPITAL ENCOUNTER (EMERGENCY)
Facility: CLINIC | Age: 54
Discharge: HOME | End: 2019-10-27
Attending: PHYSICIAN ASSISTANT | Admitting: PHYSICIAN ASSISTANT
Payer: COMMERCIAL

## 2019-10-27 VITALS
HEIGHT: 65 IN | TEMPERATURE: 98 F | OXYGEN SATURATION: 96 % | SYSTOLIC BLOOD PRESSURE: 130 MMHG | BODY MASS INDEX: 24.49 KG/M2 | DIASTOLIC BLOOD PRESSURE: 76 MMHG | WEIGHT: 147 LBS

## 2019-10-27 DIAGNOSIS — M79.672 LEFT FOOT PAIN: ICD-10-CM

## 2019-10-27 PROCEDURE — G0463 HOSPITAL OUTPT CLINIC VISIT: HCPCS | Performed by: PHYSICIAN ASSISTANT

## 2019-10-27 PROCEDURE — 73630 X-RAY EXAM OF FOOT: CPT | Mod: LT

## 2019-10-27 PROCEDURE — 99214 OFFICE O/P EST MOD 30 MIN: CPT | Mod: Z6 | Performed by: PHYSICIAN ASSISTANT

## 2019-10-27 ASSESSMENT — ENCOUNTER SYMPTOMS: CONSTITUTIONAL NEGATIVE: 1

## 2019-10-27 ASSESSMENT — MIFFLIN-ST. JEOR: SCORE: 1262.67

## 2019-10-27 NOTE — ED AVS SNAPSHOT
[facility] Emergency Department  [address] [address]  Phone:  [phone]  Fax:  [phone]                                    Tequila Neumann   MRN: [id number]    Department:  [facility] Emergency Department   Date of Visit:  10/27/2019           After Visit Summary Signature Page    I have received my discharge instructions, and my questions have been answered. I have discussed any challenges I see with this plan with the nurse or doctor.    ..........................................................................................................................................  Patient/Patient Representative Signature      ..........................................................................................................................................  Patient Representative Print Name and Relationship to Patient    ..................................................               ................................................  Date                                   Time    ..........................................................................................................................................  Reviewed by Signature/Title    ...................................................              ..............................................  Date                                               Time          22EPIC Rev 08/18

## 2019-10-27 NOTE — TELEPHONE ENCOUNTER
Patient asking if she could be seen at [facility] for possible broken foot.  FNA provided hours for [facility] and also information for [facility], as patient states her insurance doesn't require a referral.

## 2019-10-27 NOTE — ED PROVIDER NOTES
History     Chief Complaint   Patient presents with     Foot Pain     left foot pain for 2 weeks     HPI  Tequila Neumann is a 54 year old female who presents with complaints of left foot pain for the past 2 weeks since a 2 x 4 piece of wood fell onto this foot.  She states they were working to remodel their house when this occurred.  She states that she has had persistent pain throughout her left foot since that time despite wrapping the area.  Patient states her pain is worse with weightbearing.  She also has some associated swelling.      Allergies:  Allergies   Allergen Reactions     Methotrexate Hives     Primidone      Made her feel strange and out of it     Sulfa Drugs Nausea     Topamax      Personality change; memory issues     Topiramate Other (See Comments)     Personality change. Memory   Personality change; memory issues     Plaquenil [Hydroxychloroquine Sulfate] Rash       Problem List:    Patient Active Problem List    Diagnosis Date Noted     Tobacco dependence due to cigarettes 06/10/2019     Priority: Medium     Personal history of other diseases of the digestive system 06/10/2019     Priority: Medium     Fibromyalgia 06/10/2019     Priority: Medium     Major depressive disorder, single episode, moderate (H) 06/10/2019     Priority: Medium     Essential tremor 06/10/2019     Priority: Medium     Anxiety 06/10/2019     Priority: Medium     Avoids wheat/gluten and refined sugar and feeling better 01/10/2019     Priority: Medium     EGD suggestive of achalasia 01/10/2019     Priority: Medium     Familial dystonia 01/10/2019     Priority: Medium     Seronegative rheumatoid arthritis (H) 04/02/2018     Priority: Medium     ASCUS with positive high risk HPV cervical 03/16/2018     Priority: Medium     3/16/18 ASCUS pap,  + HR HPV (not 16/18). Plan colp.  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 Provider to review       Hypothyroidism 05/13/2005     Priority: Medium     Synthroid   Problem list name updated by automated process. Provider to review          Past Medical History:    Past Medical History:   Diagnosis Date     Abnormal Pap smear of cervix 1985     Abnormal Pap smear of cervix 03/16/2018     Avoids wheat/gluten and refined sugar and feeling better 1/10/2019     Calculus of GB w/ other cystitis 1/3/2012     Cervical high risk HPV (human papillomavirus) test positive 03/16/2018     EGD suggestive of achalasia 1/10/2019     Familial dystonia 1/10/2019     Fibromyalgia        Past Surgical History:    Past Surgical History:   Procedure Laterality Date     ARTHROSCOPY KNEE RT/LT Left 12/08/2005    Left medial meniscal tear bilat     COLONOSCOPY  7/11/2014    Procedure: COLONOSCOPY;  Surgeon: Viridiana Bonilla MD;  Location: [facility]     D & C  x 2     ESOPHAGOSCOPY, GASTROSCOPY, DUODENOSCOPY (EGD), COMBINED  7/16/2012    Procedure: COMBINED ESOPHAGOSCOPY, GASTROSCOPY, DUODENOSCOPY (EGD);;  Surgeon: Lito Kwon MD;  Location:  GI     HC ESOPH/GAS REFLUX TEST W NASAL IMPED ELECTRODE  8/23/2012    Procedure:  ESOPHAGEAL IMPEDENCE FUNCTION TEST 1 HOUR OR LESS;  Surgeon: Tequila Boone MD;  Location: [facility]     LAPAROSCOPIC CHOLECYSTECTOMY  1/25/2012    Procedure:LAPAROSCOPIC CHOLECYSTECTOMY; Laparoscopic vs Open Cholecystectomy; Surgeon:RANDELL MCGARRY; Location:[facility] OR     LASIK Bilateral 2000     LEEP TX, CERVICAL  1985     TUBAL LIGATION  1998       Family History:    Family History   Problem Relation Age of Onset     C.A.D. Father      Diabetes Father      Hypertension Father      Cerebrovascular Disease Father      Lipids Father      Breast Cancer Maternal Grandmother         age 80+     Alzheimer Disease Maternal Grandmother         age 70+     Cancer Mother         sarcoma, small intestinal     Neurologic Disorder Mother         tremor     Tremor Mother      Dystonia Mother      Colon Polyps Sister      Other - See Comments Sister         no clear stroke     Dystonia Daughter      Cancer - colorectal No family hx of      Prostate Cancer No family hx of        Social History:  Marital Status:  Single [1]  Social History     Tobacco Use     Smoking status: Current Every Day Smoker     Packs/day: 0.50     Years: 30.00     Pack years: 15.00     Types: Cigarettes     Smokeless tobacco: Never Used     Tobacco comment: 1/2 ppd   Substance Use Topics     Alcohol use: Yes     Comment: 3 drinks/week - helps      Drug use: No        Medications:    cholecalciferol (VITAMIN D) 1000 UNIT tablet  [facility] NICOTINE 14 MG/24HR 24 hr patch  gabapentin (NEURONTIN) 100 MG capsule  ketoconazole (NIZORAL) 2 % external cream  levothyroxine (SYNTHROID/LEVOTHROID) 100 MCG tablet  methocarbamol (ROBAXIN) 500 MG tablet  nadolol (CORGARD) 40 MG tablet  traZODone (DESYREL) 50 MG tablet          Review of Systems   Constitutional: Negative.    Musculoskeletal:        Left foot pain   Skin: Negative.    All other systems reviewed and are negative.      Physical Exam   BP: 130/76  Heart Rate: 69  Temp: 98  F (36.7  C)  Height: 165.1 cm  "(5' 5\")  Weight: 66.7 kg (147 lb)  SpO2: 96 %      Physical Exam  Constitutional:       General: She is not in acute distress.     Appearance: She is well-developed.   HENT:      Head: Normocephalic and atraumatic.   Eyes:      Conjunctiva/sclera: Conjunctivae normal.   Neck:      Musculoskeletal: Neck supple.   Cardiovascular:      Pulses: Normal pulses.   Pulmonary:      Effort: Pulmonary effort is normal.   Musculoskeletal: Normal range of motion.      Left foot: Normal range of motion and normal capillary refill. Tenderness, bony tenderness and swelling present. No crepitus, deformity or laceration.      Comments: Tenderness and swelling along dorsal mid-foot.  No overlying skin changes.   Skin:     General: Skin is warm and dry.      Capillary Refill: Capillary refill takes less than 2 seconds.      Findings: No rash.   Neurological:      Mental Status: She is alert.      Sensory: Sensation is intact.      Motor: Motor function is intact.         ED Course        Procedures    Results for orders placed or performed during the hospital encounter of 10/27/19 (from the past 24 hour(s))   Foot XR, G/E 3 views, left    Narrative    FOOT LEFT THREE OR MORE VIEWS  10/27/2019 12:55 PM     HISTORY: 2x4 dropped onto foot two weeks ago, persistent pain  throughout dorsal mid-foot.    COMPARISON: None.      Impression    IMPRESSION: No acute bony or soft tissue abnormality. Early  degenerative change at the first metatarsal-phalangeal joint.    PIERRE TOMAS MD       Medications - No data to display    Assessments & Plan (with Medical Decision Making)     Pt is a 54 year old female who presents with complaints of left foot pain for the past 2 weeks since a 2 x 4 piece of wood fell onto this foot.  She states they were working to remodel their house when this occurred.  She states that she has had persistent pain throughout her left foot since that time despite wrapping the area.  Patient states her pain is worse with " weightbearing.  She also has some associated swelling.  Pt is afebrile on arrival.  Exam as above.  X-rays of left foot were negative for fracture or acute pathology.  Discussed results with patient.  Encouraged symptomatic treatments at home.  Patient was provided with a walking boot for symptomatic treatment per her request.  Return precautions were reviewed.  Hand-outs were provided.    Patient was instructed to follow-up with PCP if no improvement in 5-7 days for continued care and management or sooner if new or worsening symptoms.  She is to return to the ED for persistent and/or worsening symptoms.  Patient expressed understanding of the diagnosis and plan and was discharged home in good condition.    I have reviewed the nursing notes.    I have reviewed the findings, diagnosis, plan and need for follow up with the patient.    Discharge Medication List as of 10/27/2019  1:29 PM          Final diagnoses:   Left foot pain       10/27/2019   [facility] EMERGENCY DEPARTMENT      Disclaimer:  This note consists of symbols derived from keyboarding, dictation and/or voice recognition software.  As a result, there may be errors in the script that have gone undetected.  Please consider this when interpreting information found in this chart.     Alejandra Mckeon PA-C  10/27/19 2051

## 2019-10-31 NOTE — PROGRESS NOTES
"Subjective     Tequila Neumann is a 54 year old female who presents to clinic today for the following health issues: complains of fatigue, hair loss. The patient reports that over the last 30 days she was taking Synthroid daily, consistently in the morning hours on empty stomach.   Complains of chronic pain, has history of fibromyalgia, tried physical therapy without improvement.  The patient noted hair loss about a month ago.     HPI   Hypothyroidism Follow-up      Since last visit, patient describes the following symptoms: Weight stable,  no skin changes, no constipation, no loose stools. Is having hair loss and fatigue.     Reviewed and updated as needed this visit by Provider  Allergies         Review of Systems   ROS COMP: Constitutional, HEENT, cardiovascular, pulmonary, gi and gu systems are negative, except as otherwise noted.      Objective    /78 (BP Location: Right arm, Patient Position: Sitting, Cuff Size: Adult Regular)   Pulse 73   Ht 1.651 m (5' 5\")   Wt 66.7 kg (147 lb)   LMP 02/20/2015 (Exact Date)   SpO2 95%   BMI 24.46 kg/m    Body mass index is 24.46 kg/m .  Physical Exam   GENERAL: healthy, alert and no distress  SKIN: no suspicious lesions or rashes and hair qualitythin  NEURO: Normal strength and tone, mentation intact and speech normal  PSYCH: mentation appears normal, affect normal/bright    Diagnostic Test Results:  Labs reviewed in Epic  Results for orders placed or performed in visit on 11/01/19 (from the past 24 hour(s))   Lipid panel reflex to direct LDL Fasting   Result Value Ref Range    Cholesterol 275 (H) <200 mg/dL    Triglycerides 163 (H) <150 mg/dL    HDL Cholesterol 61 >49 mg/dL    LDL Cholesterol Calculated 181 (H) <100 mg/dL    Non HDL Cholesterol 214 (H) <130 mg/dL   TSH   Result Value Ref Range    TSH 13.78 (H) 0.40 - 4.00 mU/L   T4, free   Result Value Ref Range    T4 Free 1.23 0.76 - 1.46 ng/dL   Basic metabolic panel   Result Value Ref Range    Sodium 136 133 " - 144 mmol/L    Potassium 3.8 3.4 - 5.3 mmol/L    Chloride 103 94 - 109 mmol/L    Carbon Dioxide 30 20 - 32 mmol/L    Anion Gap 3 3 - 14 mmol/L    Glucose 87 70 - 99 mg/dL    Urea Nitrogen 13 7 - 30 mg/dL    Creatinine 0.64 0.52 - 1.04 mg/dL    GFR Estimate >90 >60 mL/min/[1.73_m2]    GFR Estimate If Black >90 >60 mL/min/[1.73_m2]    Calcium 9.0 8.5 - 10.1 mg/dL   Ferritin   Result Value Ref Range    Ferritin 122 8 - 252 ng/mL   CBC with platelets   Result Value Ref Range    WBC 9.8 4.0 - 11.0 10e9/L    RBC Count 4.50 3.8 - 5.2 10e12/L    Hemoglobin 15.1 11.7 - 15.7 g/dL    Hematocrit 45.3 35.0 - 47.0 %     (H) 78 - 100 fl    MCH 33.6 (H) 26.5 - 33.0 pg    MCHC 33.3 31.5 - 36.5 g/dL    RDW 12.6 10.0 - 15.0 %    Platelet Count 189 150 - 450 10e9/L           Assessment & Plan     1. Hypothyroidism, unspecified type  -complains of fatigue and hair loss, thyroid function still abnormal, not adequately replaced, TSH level still slightly elevated with normal T 4. Recommended to increase Levothyroxine to 112 mcg daily AM and recheck thyroid labs again in 4-6 weeks   - TSH  - T4, free  - levothyroxine (SYNTHROID/LEVOTHROID) 112 MCG tablet; Take 1 tablet (112 mcg) by mouth daily  Dispense: 30 tablet; Refill: 2  - TSH with free T4 reflex; Future    2. Special screening for malignant neoplasms, colon    - Fecal colorectal cancer screen (FIT); Future    3. Screening for hyperlipidemia  -elevated LDL, started Lipitor  - Lipid panel reflex to direct LDL Fasting    4. Hair loss  -likely due to hypothyroidism   - Vitamin B12  - Vitamin D Deficiency  - Basic metabolic panel  - Ferritin  - CBC with platelets    5. Fibromyalgia    - DULoxetine (CYMBALTA) 30 MG capsule; Take 1 capsule (30 mg) by mouth daily  Dispense: 30 capsule; Refill: 5  - gabapentin (NEURONTIN) 100 MG capsule; Take 4 capsules (400 mg) by mouth At Bedtime  Dispense: 120 capsule; Refill: 2    6. Cervical dystonia    - methocarbamol (ROBAXIN) 500 MG tablet; 1-2  x 500mg tab by mouth nightly as needed  Dispense: 180 tablet; Refill: 3  - gabapentin (NEURONTIN) 100 MG capsule; Take 4 capsules (400 mg) by mouth At Bedtime  Dispense: 120 capsule; Refill: 2    7. Essential tremor    - propranolol (INDERAL) 60 MG tablet; Take 1 tablet (60 mg) by mouth 2 times daily  Dispense: 60 tablet; Refill: 5    8. Hyperlipidemia LDL goal <100    - atorvastatin (LIPITOR) 10 MG tablet; Take 1 tablet (10 mg) by mouth daily  Dispense: 30 tablet; Refill: 5  - Lipid panel reflex to direct LDL Fasting; Future    9. Macrocytosis without anemia    - Vitamin B12     See Patient Instructions    Return in about 4 weeks (around 11/29/2019) for Lab Work.    RADHA Ernandez [facility]

## 2019-11-01 ENCOUNTER — OFFICE VISIT (OUTPATIENT)
Dept: FAMILY MEDICINE | Facility: CLINIC | Age: 54
End: 2019-11-01
Payer: COMMERCIAL

## 2019-11-01 VITALS
OXYGEN SATURATION: 95 % | HEART RATE: 73 BPM | HEIGHT: 65 IN | WEIGHT: 147 LBS | SYSTOLIC BLOOD PRESSURE: 122 MMHG | BODY MASS INDEX: 24.49 KG/M2 | DIASTOLIC BLOOD PRESSURE: 78 MMHG

## 2019-11-01 DIAGNOSIS — Z12.11 SPECIAL SCREENING FOR MALIGNANT NEOPLASMS, COLON: ICD-10-CM

## 2019-11-01 DIAGNOSIS — G25.0 ESSENTIAL TREMOR: ICD-10-CM

## 2019-11-01 DIAGNOSIS — Z13.220 SCREENING FOR HYPERLIPIDEMIA: ICD-10-CM

## 2019-11-01 DIAGNOSIS — L65.9 HAIR LOSS: ICD-10-CM

## 2019-11-01 DIAGNOSIS — G24.3 CERVICAL DYSTONIA: ICD-10-CM

## 2019-11-01 DIAGNOSIS — D75.89 MACROCYTOSIS WITHOUT ANEMIA: ICD-10-CM

## 2019-11-01 DIAGNOSIS — E03.9 HYPOTHYROIDISM, UNSPECIFIED TYPE: Primary | ICD-10-CM

## 2019-11-01 DIAGNOSIS — E78.5 HYPERLIPIDEMIA LDL GOAL <100: ICD-10-CM

## 2019-11-01 DIAGNOSIS — M79.7 FIBROMYALGIA: ICD-10-CM

## 2019-11-01 LAB
ANION GAP SERPL CALCULATED.3IONS-SCNC: 3 MMOL/L (ref 3–14)
BUN SERPL-MCNC: 13 MG/DL (ref 7–30)
CALCIUM SERPL-MCNC: 9 MG/DL (ref 8.5–10.1)
CHLORIDE SERPL-SCNC: 103 MMOL/L (ref 94–109)
CHOLEST SERPL-MCNC: 275 MG/DL
CO2 SERPL-SCNC: 30 MMOL/L (ref 20–32)
CREAT SERPL-MCNC: 0.64 MG/DL (ref 0.52–1.04)
DEPRECATED CALCIDIOL+CALCIFEROL SERPL-MC: 72 UG/L (ref 20–75)
ERYTHROCYTE [DISTWIDTH] IN BLOOD BY AUTOMATED COUNT: 12.6 % (ref 10–15)
FERRITIN SERPL-MCNC: 122 NG/ML (ref 8–252)
GFR SERPL CREATININE-BSD FRML MDRD: >90 ML/MIN/{1.73_M2}
GLUCOSE SERPL-MCNC: 87 MG/DL (ref 70–99)
HCT VFR BLD AUTO: 45.3 % (ref 35–47)
HDLC SERPL-MCNC: 61 MG/DL
HGB BLD-MCNC: 15.1 G/DL (ref 11.7–15.7)
LDLC SERPL CALC-MCNC: 181 MG/DL
MCH RBC QN AUTO: 33.6 PG (ref 26.5–33)
MCHC RBC AUTO-ENTMCNC: 33.3 G/DL (ref 31.5–36.5)
MCV RBC AUTO: 101 FL (ref 78–100)
NONHDLC SERPL-MCNC: 214 MG/DL
PLATELET # BLD AUTO: 189 10E9/L (ref 150–450)
POTASSIUM SERPL-SCNC: 3.8 MMOL/L (ref 3.4–5.3)
RBC # BLD AUTO: 4.5 10E12/L (ref 3.8–5.2)
SODIUM SERPL-SCNC: 136 MMOL/L (ref 133–144)
T4 FREE SERPL-MCNC: 1.23 NG/DL (ref 0.76–1.46)
TRIGL SERPL-MCNC: 163 MG/DL
TSH SERPL DL<=0.005 MIU/L-ACNC: 13.78 MU/L (ref 0.4–4)
VIT B12 SERPL-MCNC: 324 PG/ML (ref 193–986)
WBC # BLD AUTO: 9.8 10E9/L (ref 4–11)

## 2019-11-01 PROCEDURE — 84443 ASSAY THYROID STIM HORMONE: CPT | Performed by: NURSE PRACTITIONER

## 2019-11-01 PROCEDURE — 80048 BASIC METABOLIC PNL TOTAL CA: CPT | Performed by: NURSE PRACTITIONER

## 2019-11-01 PROCEDURE — 82607 VITAMIN B-12: CPT | Performed by: NURSE PRACTITIONER

## 2019-11-01 PROCEDURE — 85027 COMPLETE CBC AUTOMATED: CPT | Performed by: NURSE PRACTITIONER

## 2019-11-01 PROCEDURE — 82728 ASSAY OF FERRITIN: CPT | Performed by: NURSE PRACTITIONER

## 2019-11-01 PROCEDURE — 84439 ASSAY OF FREE THYROXINE: CPT | Performed by: NURSE PRACTITIONER

## 2019-11-01 PROCEDURE — 36415 COLL VENOUS BLD VENIPUNCTURE: CPT | Performed by: NURSE PRACTITIONER

## 2019-11-01 PROCEDURE — 82306 VITAMIN D 25 HYDROXY: CPT | Performed by: NURSE PRACTITIONER

## 2019-11-01 PROCEDURE — 80061 LIPID PANEL: CPT | Performed by: NURSE PRACTITIONER

## 2019-11-01 PROCEDURE — 99214 OFFICE O/P EST MOD 30 MIN: CPT | Performed by: NURSE PRACTITIONER

## 2019-11-01 RX ORDER — PROPRANOLOL HYDROCHLORIDE 60 MG/1
1 TABLET ORAL 2 TIMES DAILY
Qty: 60 TABLET | Refills: 5 | Status: SHIPPED | OUTPATIENT
Start: 2019-11-01 | End: 2021-12-06

## 2019-11-01 RX ORDER — METHOCARBAMOL 500 MG/1
TABLET, FILM COATED ORAL
Qty: 180 TABLET | Refills: 3 | Status: SHIPPED | OUTPATIENT
Start: 2019-11-01 | End: 2024-05-31

## 2019-11-01 RX ORDER — LEVOTHYROXINE SODIUM 112 UG/1
112 TABLET ORAL DAILY
Qty: 30 TABLET | Refills: 2 | Status: SHIPPED | OUTPATIENT
Start: 2019-11-01 | End: 2020-02-03

## 2019-11-01 RX ORDER — ATORVASTATIN CALCIUM 10 MG/1
10 TABLET, FILM COATED ORAL DAILY
Qty: 30 TABLET | Refills: 5 | Status: SHIPPED | OUTPATIENT
Start: 2019-11-01 | End: 2020-01-10

## 2019-11-01 RX ORDER — DULOXETIN HYDROCHLORIDE 30 MG/1
30 CAPSULE, DELAYED RELEASE ORAL DAILY
Qty: 30 CAPSULE | Refills: 5 | Status: SHIPPED | OUTPATIENT
Start: 2019-11-01 | End: 2020-04-17

## 2019-11-01 RX ORDER — PROPRANOLOL HYDROCHLORIDE 60 MG/1
1 TABLET ORAL 2 TIMES DAILY
Refills: 3 | COMMUNITY
Start: 2019-09-23 | End: 2019-11-01

## 2019-11-01 RX ORDER — GABAPENTIN 100 MG/1
400 CAPSULE ORAL AT BEDTIME
Qty: 120 CAPSULE | Refills: 2 | Status: SHIPPED | OUTPATIENT
Start: 2019-11-01 | End: 2020-01-02

## 2019-11-01 ASSESSMENT — ANXIETY QUESTIONNAIRES
5. BEING SO RESTLESS THAT IT IS HARD TO SIT STILL: NEARLY EVERY DAY
2. NOT BEING ABLE TO STOP OR CONTROL WORRYING: MORE THAN HALF THE DAYS
3. WORRYING TOO MUCH ABOUT DIFFERENT THINGS: SEVERAL DAYS
IF YOU CHECKED OFF ANY PROBLEMS ON THIS QUESTIONNAIRE, HOW DIFFICULT HAVE THESE PROBLEMS MADE IT FOR YOU TO DO YOUR WORK, TAKE CARE OF THINGS AT HOME, OR GET ALONG WITH OTHER PEOPLE: EXTREMELY DIFFICULT
7. FEELING AFRAID AS IF SOMETHING AWFUL MIGHT HAPPEN: NOT AT ALL
GAD7 TOTAL SCORE: 14
1. FEELING NERVOUS, ANXIOUS, OR ON EDGE: NEARLY EVERY DAY
6. BECOMING EASILY ANNOYED OR IRRITABLE: MORE THAN HALF THE DAYS

## 2019-11-01 ASSESSMENT — PATIENT HEALTH QUESTIONNAIRE - PHQ9
5. POOR APPETITE OR OVEREATING: NEARLY EVERY DAY
SUM OF ALL RESPONSES TO PHQ QUESTIONS 1-9: 18

## 2019-11-01 ASSESSMENT — MIFFLIN-ST. JEOR: SCORE: 1262.67

## 2019-11-01 NOTE — PATIENT INSTRUCTIONS
Try Cymbalta 30 mg daily, can always up to 60 mg daily, or try medication in addition called Lyrica instead of Gabapentin        Recheck labs    Thyroid, will consider increasing to 112 mcg

## 2019-11-02 ASSESSMENT — ANXIETY QUESTIONNAIRES: GAD7 TOTAL SCORE: 14

## 2019-11-04 ENCOUNTER — TELEPHONE (OUTPATIENT)
Dept: FAMILY MEDICINE | Facility: CLINIC | Age: 54
End: 2019-11-04

## 2019-11-04 NOTE — TELEPHONE ENCOUNTER
Reason for Call:  Test Results    Detailed comments: Patient got a letter in the mail re: her resent test results.  She had questions about her platelet count.      Phone Number Patient can be reached at: Home number on file [phone] (home)    Best Time: Any    Can we leave a detailed message on this number? YES    Call taken on 11/4/2019 at 10:06 AM by Sis Bowen

## 2019-11-05 DIAGNOSIS — E78.5 HYPERLIPIDEMIA LDL GOAL <100: ICD-10-CM

## 2019-11-05 NOTE — TELEPHONE ENCOUNTER
"Requested Prescriptions   Pending Prescriptions Disp Refills     atorvastatin (LIPITOR) 10 MG tablet 30 tablet 5     Sig: Take 1 tablet (10 mg) by mouth daily   Last Written Prescription Date:  11/1/19  Last Fill Quantity: 30 tab,  # refills: 5   Last office visit: 11/1/2019 with prescribing provider:  Emma Beltran     Future Office Visit:   Next 5 appointments (look out 90 days)    Nov 13, 2019  3:00 PM CST  PHYSICAL with Carmenza Hooks MD  [facility] ([facility] [address] [address]  [phone]             Statins Protocol Passed - 11/5/2019 10:28 AM        Passed - LDL on file in past 12 months     Recent Labs   Lab Test 11/01/19  1029   *             Passed - No abnormal creatine kinase in past 12 months     No lab results found.             Passed - Recent (12 mo) or future (30 days) visit within the authorizing provider's specialty     Patient has had an office visit with the authorizing provider or a provider within the authorizing providers department within the previous 12 mos or has a future within next 30 days. See \"Patient Info\" tab in inbasket, or \"Choose Columns\" in Meds & Orders section of the refill encounter.              Passed - Medication is active on med list        Passed - Patient is age 18 or older        Passed - No active pregnancy on record        Passed - No positive pregnancy test in past 12 months          "

## 2019-11-06 RX ORDER — ATORVASTATIN CALCIUM 10 MG/1
10 TABLET, FILM COATED ORAL DAILY
Qty: 30 TABLET | Refills: 5 | OUTPATIENT
Start: 2019-11-06

## 2019-11-07 NOTE — TELEPHONE ENCOUNTER
Patient does not return call to clinic after several attempts. Closing encounter.      Marsha FAGAN, BSN, RN

## 2019-11-11 ENCOUNTER — TELEPHONE (OUTPATIENT)
Dept: FAMILY MEDICINE | Facility: CLINIC | Age: 54
End: 2019-11-11

## 2019-11-11 DIAGNOSIS — R53.83 FATIGUE, UNSPECIFIED TYPE: Primary | ICD-10-CM

## 2019-11-11 NOTE — TELEPHONE ENCOUNTER
Pt is concerned about her Platelets being on the low end of normal.  Pts mom  of cancer and she had low platelets.  Pt would like any recommendations or plan.  Advise.  Alvaro

## 2019-11-11 NOTE — TELEPHONE ENCOUNTER
Platelet count was normal. We can repeat CBC again. There are other causes except for cancer (possibly mom had leukemia)  That can make low platelet: chronic use of NSAID's, liver disease, alcohol use, etc.       Please reassure patient her platelet count was normal. I signed order to repeat CBC with dif.    RADHA Ernandez CNP

## 2019-11-13 ENCOUNTER — OFFICE VISIT (OUTPATIENT)
Dept: OBGYN | Facility: CLINIC | Age: 54
End: 2019-11-13
Payer: COMMERCIAL

## 2019-11-13 VITALS
HEART RATE: 63 BPM | RESPIRATION RATE: 16 BRPM | TEMPERATURE: 97.6 F | WEIGHT: 149.9 LBS | DIASTOLIC BLOOD PRESSURE: 74 MMHG | BODY MASS INDEX: 24.97 KG/M2 | HEIGHT: 65 IN | SYSTOLIC BLOOD PRESSURE: 121 MMHG

## 2019-11-13 DIAGNOSIS — Z00.00 ROUTINE GENERAL MEDICAL EXAMINATION AT A HEALTH CARE FACILITY: Primary | ICD-10-CM

## 2019-11-13 DIAGNOSIS — Z83.719 FAMILY HISTORY OF POLYPS IN THE COLON: ICD-10-CM

## 2019-11-13 PROCEDURE — 90686 IIV4 VACC NO PRSV 0.5 ML IM: CPT | Performed by: OBSTETRICS & GYNECOLOGY

## 2019-11-13 PROCEDURE — 99396 PREV VISIT EST AGE 40-64: CPT | Mod: 25 | Performed by: OBSTETRICS & GYNECOLOGY

## 2019-11-13 PROCEDURE — 87624 HPV HI-RISK TYP POOLED RSLT: CPT | Performed by: OBSTETRICS & GYNECOLOGY

## 2019-11-13 PROCEDURE — 88175 CYTOPATH C/V AUTO FLUID REDO: CPT | Performed by: OBSTETRICS & GYNECOLOGY

## 2019-11-13 PROCEDURE — 90471 IMMUNIZATION ADMIN: CPT | Performed by: OBSTETRICS & GYNECOLOGY

## 2019-11-13 ASSESSMENT — MIFFLIN-ST. JEOR: SCORE: 1279.78

## 2019-11-13 NOTE — PATIENT INSTRUCTIONS
Preventive Health Recommendations  Female Ages 50 - 64    Yearly exam: See your health care provider every year in order to  o Review health changes.   o Discuss preventive care.    o Review your medicines if your doctor has prescribed any.      Get a Pap test every three years (unless you have an abnormal result and your provider advises testing more often).    If you get Pap tests with HPV test, you only need to test every 5 years, unless you have an abnormal result.     You do not need a Pap test if your uterus was removed (hysterectomy) and you have not had cancer.    You should be tested each year for STDs (sexually transmitted diseases) if you're at risk.     Have a mammogram every 1 to 2 years.    Have a colonoscopy at age 50, or have a yearly FIT test (stool test). These exams screen for colon cancer.      Have a cholesterol test every 5 years, or more often if advised.    Have a diabetes test (fasting glucose) every three years. If you are at risk for diabetes, you should have this test more often.     If you are at risk for osteoporosis (brittle bone disease), think about having a bone density scan (DEXA).    Shots: Get a flu shot each year. Get a tetanus shot every 10 years.    Nutrition:     Eat at least 5 servings of fruits and vegetables each day.    Eat whole-grain bread, whole-wheat pasta and brown rice instead of white grains and rice.    Get adequate Calcium and Vitamin D.     Lifestyle    Exercise at least 150 minutes a week (30 minutes a day, 5 days a week). This will help you control your weight and prevent disease.    Limit alcohol to one drink per day.    No smoking.  [place] can help you stop smoking; ask about smoking cessation program.    Wear sunscreen to prevent skin cancer.     See your dentist every six months for an exam and cleaning.    See your eye doctor every 1 to 2 years.    Take a calcium/Vitamin D supplement daily to reduce risk of osteopenia (low bone density).  Bone density  tests start at age 65 (without risk factors)   I recommend 400-600 mg Calcium and 400-800 mIU Vitamin D, daily     Due to family history of colonic polyps a colonoscopy is recommended every 5 years.  Your last colonoscopy was in 2014 so a referral has been made for another one.

## 2019-11-13 NOTE — PROGRESS NOTES
SUBJECTIVE:   CC: Tequila Neumann is an 54 year old woman who presents for preventive health visit.  Last year she had an ASCUS Pap smear and + HPV test so she had a colposcopy.  The exam and biopsies were benign.      Healthy Habits:    Do you get at least three servings of calcium containing foods daily (dairy, green leafy vegetables, etc.)? no    Amount of exercise or daily activities, outside of work: 3 day(s) per week    Problems taking medications regularly No    Medication side effects: No    Have you had an eye exam in the past two years? yes    Do you see a dentist twice per year? yes    Do you have sleep apnea, excessive snoring or daytime drowsiness?no          Today's PHQ-2 Score:   PHQ-2 ( 1999 [employer]) 11/13/2019 3/16/2018   Q1: Little interest or pleasure in doing things 1 1   Q2: Feeling down, depressed or hopeless 1 1   PHQ-2 Score 2 2       Abuse: Current or Past(Physical, Sexual or Emotional)- No  Do you feel safe in your environment? Yes    Have you ever done Advance Care Planning? (For example, a Health Directive, POLST, or a discussion with a medical provider or your loved ones about your wishes): No, advance care planning information given to patient to review.  Patient plans to discuss their wishes with loved ones or provider.      Social History     Tobacco Use     Smoking status: Current Every Day Smoker     Packs/day: 0.50     Years: 30.00     Pack years: 15.00     Types: Cigarettes     Smokeless tobacco: Never Used     Tobacco comment: 1/2 ppd   Substance Use Topics     Alcohol use: Yes     Comment: 3 drinks/week - helps      If you drink alcohol do you typically have >3 drinks per day or >7 drinks per week? No                     Reviewed orders with patient.  Reviewed health maintenance and updated orders accordingly -         Mammogram Screening: Patient over age 50, mutual decision to screen reflected in health maintenance.    Pertinent mammograms are reviewed under the imaging  "tab.  History of abnormal Pap smear: YES - updated in Problem List and Health Maintenance accordingly  PAP / HPV Latest Ref Rng & Units 3/16/2018 7/28/2014 1/17/2011   PAP - ASC-US(A) OTHER-NIL, See Result NIL   HPV 16 DNA NEG:Negative Negative - -   HPV 18 DNA NEG:Negative Negative - -   OTHER HR HPV NEG:Negative Positive(A) - -     Reviewed and updated as needed this visit by clinical staff  Tobacco  Allergies  Meds  Med Hx  Surg Hx  Fam Hx  Soc Hx        Reviewed and updated as needed this visit by Provider        Past Medical History:   Diagnosis Date     Abnormal Pap smear of cervix 1985    s/p LEEP     Abnormal Pap smear of cervix 03/16/2018    See problem list     Avoids wheat/gluten and refined sugar and feeling better 1/10/2019     Calculus of GB w/ other cystitis 1/3/2012     Cervical high risk HPV (human papillomavirus) test positive 03/16/2018    See problem list     EGD suggestive of achalasia 1/10/2019     Familial dystonia 1/10/2019     Fibromyalgia         ROS:  CONSTITUTIONAL: NEGATIVE for fever, chills, change in weight  INTEGUMENTARY/SKIN: NEGATIVE for worrisome rashes, moles or lesions  EYES: NEGATIVE for vision changes or irritation  ENT: NEGATIVE for ear, mouth and throat problems  RESP: NEGATIVE for significant cough or SOB  BREAST: NEGATIVE for masses, tenderness or discharge  CV: NEGATIVE for chest pain, palpitations or peripheral edema  GI: NEGATIVE for nausea, abdominal pain, heartburn, or change in bowel habits  : NEGATIVE for unusual urinary or vaginal symptoms. No vaginal bleeding.  MUSCULOSKELETAL: NEGATIVE for significant arthralgias or myalgia  NEURO: NEGATIVE for weakness, dizziness or paresthesias  PSYCHIATRIC: NEGATIVE for changes in mood or affect     OBJECTIVE:   /74   Pulse 63   Temp 97.6  F (36.4  C)   Resp 16   Ht 1.657 m (5' 5.25\")   Wt 68 kg (149 lb 14.4 oz)   LMP 02/20/2015 (Exact Date)   BMI 24.75 kg/m    EXAM:  GENERAL: healthy, alert and no " "distress  EYES: Eyes grossly normal to inspection, PERRL and conjunctivae and sclerae normal  HENT: ear canals and TM's normal, nose and mouth without ulcers or lesions  NECK: no adenopathy, no asymmetry, masses, or scars and thyroid normal to palpation  RESP: lungs clear to auscultation - no rales, rhonchi or wheezes  BREAST: normal without masses, tenderness or nipple discharge and no palpable axillary masses or adenopathy  CV: regular rate and rhythm, normal S1 S2, no S3 or S4, no murmur, click or rub, no peripheral edema and peripheral pulses strong  ABDOMEN: soft, nontender, no hepatosplenomegaly, no masses and bowel sounds normal   (female): normal female external genitalia, normal urethral meatus, vaginal mucosa pink, moist, well rugated, and normal cervix/adnexa/uterus without masses or discharge  MS: no gross musculoskeletal defects noted, no edema  SKIN: no suspicious lesions or rashes  NEURO: Normal strength and tone, mentation intact and speech normal  PSYCH: mentation appears normal, affect normal/bright    Diagnostic Test Results:  Labs reviewed in Epic  none     ASSESSMENT/PLAN:   1. Routine general medical examination at a health care facility    - HC FLU VAC PRESRV FREE QUAD SPLIT VIR > 6 MONTHS IM [[id number]]  - Pap imaged thin layer screen with HPV - recommended age 30 - 65 years (select HPV order below)  - HPV High Risk Types DNA Cervical    2. Family history of polyps in the colon    Recommend colonoscopy (she has a referral)       COUNSELING:   Reviewed preventive health counseling, as reflected in patient instructions    Estimated body mass index is 24.75 kg/m  as calculated from the following:    Height as of this encounter: 1.657 m (5' 5.25\").    Weight as of this encounter: 68 kg (149 lb 14.4 oz).         reports that she has been smoking cigarettes. She has a 15.00 pack-year smoking history. She has never used smokeless tobacco.  Smoking cessation offered in AVS.      Counseling " Resources:  ATP IV Guidelines  Pooled Cohorts Equation Calculator  Breast Cancer Risk Calculator  FRAX Risk Assessment  ICSI Preventive Guidelines  Dietary Guidelines for Americans, 2010  USDA's MyPlate  ASA Prophylaxis  Lung CA Screening    Carmenza Hooks MD  [facility]

## 2019-11-18 LAB
COPATH REPORT: NORMAL
PAP: NORMAL

## 2019-11-19 LAB
FINAL DIAGNOSIS: NORMAL
HPV HR 12 DNA CVX QL NAA+PROBE: NEGATIVE
HPV16 DNA SPEC QL NAA+PROBE: NEGATIVE
HPV18 DNA SPEC QL NAA+PROBE: NEGATIVE
SPECIMEN DESCRIPTION: NORMAL
SPECIMEN SOURCE CVX/VAG CYTO: NORMAL

## 2019-12-03 ENCOUNTER — TRANSFERRED RECORDS (OUTPATIENT)
Dept: HEALTH INFORMATION MANAGEMENT | Facility: CLINIC | Age: 54
End: 2019-12-03

## 2019-12-31 ENCOUNTER — TRANSFERRED RECORDS (OUTPATIENT)
Dept: HEALTH INFORMATION MANAGEMENT | Facility: CLINIC | Age: 54
End: 2019-12-31

## 2020-01-02 DIAGNOSIS — E03.9 HYPOTHYROIDISM, UNSPECIFIED TYPE: ICD-10-CM

## 2020-01-02 DIAGNOSIS — G24.3 CERVICAL DYSTONIA: ICD-10-CM

## 2020-01-02 DIAGNOSIS — M79.7 FIBROMYALGIA: ICD-10-CM

## 2020-01-02 RX ORDER — GABAPENTIN 100 MG/1
CAPSULE ORAL
Qty: 120 CAPSULE | Refills: 2 | Status: SHIPPED | OUTPATIENT
Start: 2020-01-02 | End: 2020-03-24

## 2020-01-02 NOTE — TELEPHONE ENCOUNTER
"Requested Prescriptions   Pending Prescriptions Disp Refills     levothyroxine (SYNTHROID/LEVOTHROID) 112 MCG tablet [Pharmacy Med Name: LEVOTHYROXINE 112 MCG TABLET] 30 tablet 2     Sig: TAKE 1 TABLET BY MOUTH DAILY       Thyroid Protocol Failed - 1/2/2020  1:35 PM        Failed - Normal TSH on file in past 12 months     Recent Labs   Lab Test 11/01/19  1029   TSH 13.78*              Passed - Patient is 12 years or older        Passed - Recent (12 mo) or future (30 days) visit within the authorizing provider's specialty     Patient has had an office visit with the authorizing provider or a provider within the authorizing providers department within the previous 12 mos or has a future within next 30 days. See \"Patient Info\" tab in inbasket, or \"Choose Columns\" in Meds & Orders section of the refill encounter.              Passed - Medication is active on med list        Passed - No active pregnancy on record     If patient is pregnant or has had a positive pregnancy test, please check TSH.          Passed - No positive pregnancy test in past 12 months     If patient is pregnant or has had a positive pregnancy test, please check TSH.          Last Written Prescription Date:  11/1/2019  Last Fill Quantity: 30,  # refills: 2   Last office visit: 11/1/2019 with prescribing provider:  Ruby   Future Office Visit:      "

## 2020-01-02 NOTE — TELEPHONE ENCOUNTER
Requested Prescriptions   Pending Prescriptions Disp Refills     gabapentin (NEURONTIN) 100 MG capsule [Pharmacy Med Name: GABAPENTIN 100 MG CAPSULE] 120 capsule 2     Sig: TAKE 4 CAPSULES BY MOUTH EVERY DAY AT BEDTIME       There is no refill protocol information for this order              Last Written Prescription Date:  11/1/2019  Last Fill Quantity: 120,   # refills: 2  Last Office Visit: 11/1/2019 with Ruby   Future Office visit:       Routing refill request to provider for review/approval because:  Drug not on the G, P or [facility] refill protocol or controlled substance

## 2020-01-03 RX ORDER — LEVOTHYROXINE SODIUM 112 UG/1
TABLET ORAL
Qty: 30 TABLET | Refills: 0 | OUTPATIENT
Start: 2020-01-03

## 2020-01-03 NOTE — TELEPHONE ENCOUNTER
Called pt, says she has enough and does not need a refill right now. Discussed that she needs to come in fasting for labs (has future orders for TSH w/ Free T4, lipids, and CBC w/ PLT & diff). She said she will do this, and advised she do it soon, and certainly before she runs out of her medication. She said she will call to schedule a lab appt.     Last lab note (from 11/1/19 labs:  '..recommend to increase Synthroid to 112 mcg daily AM and recheck thyroid labs in 4-6 weeks.'      Larissa HEAD RN

## 2020-01-08 ENCOUNTER — TRANSFERRED RECORDS (OUTPATIENT)
Dept: HEALTH INFORMATION MANAGEMENT | Facility: CLINIC | Age: 55
End: 2020-01-08

## 2020-01-10 DIAGNOSIS — E03.9 HYPOTHYROIDISM, UNSPECIFIED TYPE: ICD-10-CM

## 2020-01-10 DIAGNOSIS — R53.83 FATIGUE, UNSPECIFIED TYPE: ICD-10-CM

## 2020-01-10 DIAGNOSIS — E78.5 HYPERLIPIDEMIA LDL GOAL <100: ICD-10-CM

## 2020-01-10 LAB
BASOPHILS # BLD AUTO: 0 10E9/L (ref 0–0.2)
BASOPHILS NFR BLD AUTO: 0.5 %
CHOLEST SERPL-MCNC: 249 MG/DL
DIFFERENTIAL METHOD BLD: NORMAL
EOSINOPHIL # BLD AUTO: 0.2 10E9/L (ref 0–0.7)
EOSINOPHIL NFR BLD AUTO: 2.2 %
ERYTHROCYTE [DISTWIDTH] IN BLOOD BY AUTOMATED COUNT: 12.7 % (ref 10–15)
HCT VFR BLD AUTO: 46.2 % (ref 35–47)
HDLC SERPL-MCNC: 67 MG/DL
HGB BLD-MCNC: 15 G/DL (ref 11.7–15.7)
IMM GRANULOCYTES # BLD: 0.1 10E9/L (ref 0–0.4)
IMM GRANULOCYTES NFR BLD: 0.6 %
LDLC SERPL CALC-MCNC: 145 MG/DL
LYMPHOCYTES # BLD AUTO: 4.4 10E9/L (ref 0.8–5.3)
LYMPHOCYTES NFR BLD AUTO: 51.2 %
MCH RBC QN AUTO: 31.6 PG (ref 26.5–33)
MCHC RBC AUTO-ENTMCNC: 32.5 G/DL (ref 31.5–36.5)
MCV RBC AUTO: 97 FL (ref 78–100)
MONOCYTES # BLD AUTO: 0.7 10E9/L (ref 0–1.3)
MONOCYTES NFR BLD AUTO: 7.7 %
NEUTROPHILS # BLD AUTO: 3.2 10E9/L (ref 1.6–8.3)
NEUTROPHILS NFR BLD AUTO: 37.8 %
NONHDLC SERPL-MCNC: 182 MG/DL
NRBC # BLD AUTO: 0 10*3/UL
NRBC BLD AUTO-RTO: 0 /100
PLATELET # BLD AUTO: 241 10E9/L (ref 150–450)
RBC # BLD AUTO: 4.75 10E12/L (ref 3.8–5.2)
T4 FREE SERPL-MCNC: 1.23 NG/DL (ref 0.76–1.46)
TRIGL SERPL-MCNC: 186 MG/DL
TSH SERPL DL<=0.005 MIU/L-ACNC: 12.7 MU/L (ref 0.4–4)
WBC # BLD AUTO: 8.6 10E9/L (ref 4–11)

## 2020-01-10 PROCEDURE — 84443 ASSAY THYROID STIM HORMONE: CPT | Performed by: NURSE PRACTITIONER

## 2020-01-10 PROCEDURE — 36415 COLL VENOUS BLD VENIPUNCTURE: CPT | Performed by: NURSE PRACTITIONER

## 2020-01-10 PROCEDURE — 85025 COMPLETE CBC W/AUTO DIFF WBC: CPT | Performed by: NURSE PRACTITIONER

## 2020-01-10 PROCEDURE — 80061 LIPID PANEL: CPT | Performed by: NURSE PRACTITIONER

## 2020-01-10 PROCEDURE — 84439 ASSAY OF FREE THYROXINE: CPT | Performed by: NURSE PRACTITIONER

## 2020-01-27 ENCOUNTER — TELEPHONE (OUTPATIENT)
Dept: FAMILY MEDICINE | Facility: CLINIC | Age: 55
End: 2020-01-27

## 2020-01-27 ENCOUNTER — TRANSFERRED RECORDS (OUTPATIENT)
Dept: HEALTH INFORMATION MANAGEMENT | Facility: CLINIC | Age: 55
End: 2020-01-27

## 2020-01-31 ENCOUNTER — TELEPHONE (OUTPATIENT)
Dept: FAMILY MEDICINE | Facility: CLINIC | Age: 55
End: 2020-01-31

## 2020-01-31 DIAGNOSIS — E03.9 HYPOTHYROIDISM, UNSPECIFIED TYPE: ICD-10-CM

## 2020-01-31 NOTE — TELEPHONE ENCOUNTER
Pt has cbc with plt, lipids, tsh and ft4 on 1/10/20 result notes from provider:  Pt responded to these comments in message below this message.  Emma Beltran ordered the lab work.  Dr Lockett provided the result notes.  Vy Harmon, RN        Patient Result Comments     Written by Richard Lockett MD on 1/10/2020 12:02 PM   Hi Deana,    The TSH thyroid test is improving slightly down from 13.78 to 12.70 now, but still above goal.  My question is if you take Biotin a hair/skin/nail supplement as this can interfere with the TSH lab test and make it higher than it really is.  We'll wait for the T4 to see what to do with your thyroid dosing.   Cholesterol has improved since 2 months ago which is good to see, not too high to need medication. So I agree with stopping the Lipitor if you haven't been taking it.  If you are currently taking the lipitor then do continue as this has improved cholesterol then.   To improve your cholesterol exercise 30 minutes per day five days a week, increase eating fresh or frozen fruits and vegetables at least 5 per day, increase eating lean meats like fish, and avoid fried foods.   Blood counts are all normal.     Covering for your clinician.   Thanks,   Richard Lockett MD   [facility]       The 10-year ASCVD risk score (Cheng DC Jr., et al., 2013) is: 4.5%     Values used to calculate the score:       Age: 54 years       Sex: Female       Is Non- : No       Diabetic: No       Tobacco smoker: Yes       Systolic Blood Pressure: 121 mmHg       Is BP treated: No       HDL Cholesterol: 67 mg/dL       Total Cholesterol: 249 mg/dL     T4 is in middle range of normal which is good.   Please let us know if you are taking Biotin or a supplement containing biotin.   And if you are taking the Lipitor or not currently.   Thanks,   Richard Lockett MD

## 2020-01-31 NOTE — TELEPHONE ENCOUNTER
Reason for Call:  Answers to question on labs    Detailed comments: Patient was on Biotene, and NO she does not take the Lipitor anymore. Please advise. On what to do next about her TSH T4.    Phone Number Patient can be reached at: Home number on file [phone] (home)    Best Time: any    Can we leave a detailed message on this number? YES   Eloisa Sultana  [facility] [place]       Call taken on 1/31/2020 at 3:53 PM by Eloisa [address]

## 2020-01-31 NOTE — TELEPHONE ENCOUNTER
Routed to Emma Beltran who ordered the lab work.  Dr Lockett is out of clinic now.  Yes, pt is taking biotin for skin and hair.  No, not taking Lipitor.    What to do about thyroid dosing?    Vy Harmon RN

## 2020-02-03 RX ORDER — LEVOTHYROXINE SODIUM 125 UG/1
125 TABLET ORAL DAILY
Qty: 30 TABLET | Refills: 1 | Status: SHIPPED | OUTPATIENT
Start: 2020-02-03 | End: 2020-03-20

## 2020-02-03 NOTE — TELEPHONE ENCOUNTER
Recommend to increase to 125 mcg daily, hold Biotin supplement or 2 weeks before next thyroid labs, recheck thyroid function in 4-6 weeks, lab only    RADHA Ernandez CNP

## 2020-02-06 ENCOUNTER — TRANSFERRED RECORDS (OUTPATIENT)
Dept: HEALTH INFORMATION MANAGEMENT | Facility: CLINIC | Age: 55
End: 2020-02-06

## 2020-02-20 ENCOUNTER — TRANSFERRED RECORDS (OUTPATIENT)
Dept: HEALTH INFORMATION MANAGEMENT | Facility: CLINIC | Age: 55
End: 2020-02-20

## 2020-02-20 ENCOUNTER — HOSPITAL LABORATORY (OUTPATIENT)
Dept: OTHER | Facility: CLINIC | Age: 55
End: 2020-02-20

## 2020-02-20 LAB
APPEARANCE FLD: NORMAL
COLOR FLD: YELLOW
CRYSTALS SNV MICRO: NORMAL
GRAM STN SPEC: NORMAL
GRAM STN SPEC: NORMAL
LYMPHOCYTES NFR FLD MANUAL: 13 %
Lab: NORMAL
MONOS+MACROS NFR FLD MANUAL: 14 %
NEUTS BAND NFR FLD MANUAL: 73 %
SPECIMEN SOURCE FLD: NORMAL
SPECIMEN SOURCE SNV: NORMAL
SPECIMEN SOURCE: NORMAL
WBC # FLD AUTO: NORMAL /UL

## 2020-02-23 LAB
B BURGDOR DNA SPEC QL NAA+PROBE: NOT DETECTED
SPECIMEN SOURCE: NORMAL

## 2020-02-25 LAB
BACTERIA SPEC CULT: NO GROWTH
Lab: NORMAL
SPECIMEN SOURCE: NORMAL

## 2020-03-04 DIAGNOSIS — M79.7 FIBROMYALGIA: ICD-10-CM

## 2020-03-04 DIAGNOSIS — E03.9 HYPOTHYROIDISM, UNSPECIFIED TYPE: ICD-10-CM

## 2020-03-04 RX ORDER — LEVOTHYROXINE SODIUM 125 UG/1
TABLET ORAL
Qty: 30 TABLET | Refills: 1 | OUTPATIENT
Start: 2020-03-04

## 2020-03-04 RX ORDER — DULOXETIN HYDROCHLORIDE 30 MG/1
CAPSULE, DELAYED RELEASE ORAL
Qty: 90 CAPSULE | Refills: 1 | OUTPATIENT
Start: 2020-03-04

## 2020-03-04 NOTE — TELEPHONE ENCOUNTER
"Requested Prescriptions   Pending Prescriptions Disp Refills     DULoxetine (CYMBALTA) 30 MG capsule [Pharmacy Med Name: DULOXETINE HCL DR 30 MG CAP]  Last Written Prescription Date:  11/1/19  Last Fill Quantity: 30,  # refills: 5   Last Office Visit with [facility], [facility] or [facility] prescribing provider:  11/1/19   Future Office Visit:      90 capsule 1     Sig: TAKE 1 CAPSULE BY MOUTH EVERY DAY       Serotonin-Norepinephrine Reuptake Inhibitors  Passed - 3/4/2020  7:32 AM        Passed - Blood pressure under 140/90 in past 12 months     BP Readings from Last 3 Encounters:   11/13/19 121/74   11/01/19 122/78   10/27/19 130/76                 Passed - Recent (12 mo) or future (30 days) visit within the authorizing provider's specialty     Patient has had an office visit with the authorizing provider or a provider within the authorizing providers department within the previous 12 mos or has a future within next 30 days. See \"Patient Info\" tab in inbasket, or \"Choose Columns\" in Meds & Orders section of the refill encounter.              Passed - Medication is active on med list        Passed - Patient is age 18 or older        Passed - No active pregnancy on record        Passed - No positive pregnancy test in past 12 months        levothyroxine (SYNTHROID/LEVOTHROID) 125 MCG tablet [Pharmacy Med Name: LEVOTHYROXINE 125 MCG TABLET]  Last Written Prescription Date:  2/3/20  Last Fill Quantity: 30,  # refills: 1   Last Office Visit with [facility] or [facility] prescribing provider:  11/1/19   Future Office Visit:      30 tablet 1     Sig: TAKE 1 TABLET BY MOUTH EVERY DAY       Thyroid Protocol Failed - 3/4/2020  7:32 AM        Failed - Normal TSH on file in past 12 months     Recent Labs   Lab Test 01/10/20  1013   TSH 12.70*              Passed - Patient is 12 years or older        Passed - Recent (12 mo) or future (30 days) visit within the authorizing provider's specialty     Patient has had an office visit with the " "authorizing provider or a provider within the authorizing providers department within the previous 12 mos or has a future within next 30 days. See \"Patient Info\" tab in inbasket, or \"Choose Columns\" in Meds & Orders section of the refill encounter.              Passed - Medication is active on med list        Passed - No active pregnancy on record     If patient is pregnant or has had a positive pregnancy test, please check TSH.          Passed - No positive pregnancy test in past 12 months     If patient is pregnant or has had a positive pregnancy test, please check TSH.            "

## 2020-03-05 LAB
BACTERIA SPEC CULT: NORMAL
Lab: NORMAL
SPECIMEN SOURCE: NORMAL

## 2020-03-19 ENCOUNTER — TELEPHONE (OUTPATIENT)
Dept: FAMILY MEDICINE | Facility: CLINIC | Age: 55
End: 2020-03-19

## 2020-03-19 ENCOUNTER — OFFICE VISIT (OUTPATIENT)
Dept: FAMILY MEDICINE | Facility: CLINIC | Age: 55
End: 2020-03-19
Payer: COMMERCIAL

## 2020-03-19 VITALS
HEIGHT: 65 IN | SYSTOLIC BLOOD PRESSURE: 126 MMHG | BODY MASS INDEX: 25.56 KG/M2 | DIASTOLIC BLOOD PRESSURE: 74 MMHG | RESPIRATION RATE: 16 BRPM | OXYGEN SATURATION: 95 % | WEIGHT: 153.4 LBS | HEART RATE: 87 BPM | TEMPERATURE: 98.1 F

## 2020-03-19 DIAGNOSIS — M25.462 BILATERAL KNEE SWELLING: Primary | ICD-10-CM

## 2020-03-19 DIAGNOSIS — E03.9 HYPOTHYROIDISM, UNSPECIFIED TYPE: ICD-10-CM

## 2020-03-19 DIAGNOSIS — Z12.11 SPECIAL SCREENING FOR MALIGNANT NEOPLASMS, COLON: ICD-10-CM

## 2020-03-19 DIAGNOSIS — M25.461 BILATERAL KNEE SWELLING: Primary | ICD-10-CM

## 2020-03-19 LAB
BASOPHILS # BLD AUTO: 0 10E9/L (ref 0–0.2)
BASOPHILS NFR BLD AUTO: 0.3 %
CRP SERPL-MCNC: 15.6 MG/L (ref 0–8)
DIFFERENTIAL METHOD BLD: NORMAL
EOSINOPHIL # BLD AUTO: 0.1 10E9/L (ref 0–0.7)
EOSINOPHIL NFR BLD AUTO: 1.7 %
ERYTHROCYTE [DISTWIDTH] IN BLOOD BY AUTOMATED COUNT: 14.7 % (ref 10–15)
ERYTHROCYTE [SEDIMENTATION RATE] IN BLOOD BY WESTERGREN METHOD: 14 MM/H (ref 0–30)
HCT VFR BLD AUTO: 43.1 % (ref 35–47)
HGB BLD-MCNC: 14.2 G/DL (ref 11.7–15.7)
LYMPHOCYTES # BLD AUTO: 3.2 10E9/L (ref 0.8–5.3)
LYMPHOCYTES NFR BLD AUTO: 41.1 %
MCH RBC QN AUTO: 31.9 PG (ref 26.5–33)
MCHC RBC AUTO-ENTMCNC: 32.9 G/DL (ref 31.5–36.5)
MCV RBC AUTO: 97 FL (ref 78–100)
MONOCYTES # BLD AUTO: 0.7 10E9/L (ref 0–1.3)
MONOCYTES NFR BLD AUTO: 9.1 %
NEUTROPHILS # BLD AUTO: 3.8 10E9/L (ref 1.6–8.3)
NEUTROPHILS NFR BLD AUTO: 47.8 %
PLATELET # BLD AUTO: 250 10E9/L (ref 150–450)
RBC # BLD AUTO: 4.45 10E12/L (ref 3.8–5.2)
T4 FREE SERPL-MCNC: 1.17 NG/DL (ref 0.76–1.46)
TSH SERPL DL<=0.005 MIU/L-ACNC: 9.03 MU/L (ref 0.4–4)
WBC # BLD AUTO: 7.8 10E9/L (ref 4–11)

## 2020-03-19 PROCEDURE — 99214 OFFICE O/P EST MOD 30 MIN: CPT | Performed by: NURSE PRACTITIONER

## 2020-03-19 PROCEDURE — 85025 COMPLETE CBC W/AUTO DIFF WBC: CPT | Performed by: NURSE PRACTITIONER

## 2020-03-19 PROCEDURE — 84443 ASSAY THYROID STIM HORMONE: CPT | Performed by: NURSE PRACTITIONER

## 2020-03-19 PROCEDURE — 36415 COLL VENOUS BLD VENIPUNCTURE: CPT | Performed by: NURSE PRACTITIONER

## 2020-03-19 PROCEDURE — 86140 C-REACTIVE PROTEIN: CPT | Performed by: NURSE PRACTITIONER

## 2020-03-19 PROCEDURE — 85652 RBC SED RATE AUTOMATED: CPT | Performed by: NURSE PRACTITIONER

## 2020-03-19 PROCEDURE — 84439 ASSAY OF FREE THYROXINE: CPT | Performed by: NURSE PRACTITIONER

## 2020-03-19 RX ORDER — HYDROCODONE BITARTRATE AND ACETAMINOPHEN 5; 325 MG/1; MG/1
1 TABLET ORAL EVERY 6 HOURS PRN
Qty: 20 TABLET | Refills: 0 | Status: SHIPPED | OUTPATIENT
Start: 2020-03-19 | End: 2020-04-17

## 2020-03-19 RX ORDER — CELECOXIB 100 MG/1
100 CAPSULE ORAL 2 TIMES DAILY
Qty: 60 CAPSULE | Refills: 3 | Status: SHIPPED | OUTPATIENT
Start: 2020-03-19 | End: 2020-04-17

## 2020-03-19 ASSESSMENT — MIFFLIN-ST. JEOR: SCORE: 1290.66

## 2020-03-19 NOTE — PROGRESS NOTES
Subjective     Tequila Neumann is a 55 year old female who presents to clinic today for the following health issues: complains of bilateral knee edema, states left knee edema resolved, but she still have right knee swelling. The patient reports she recently saw ortho and finished a series of HA injections. She saw Dr. Pennington last week and had her knee aspirated and injected with Cortisol. Synovial fluid analysis came back positive for moderately elevated WBC count, no crystal sen and negative culture. Deana states her pain started in June-July last summer after she stopped Orencia, she was treated with Abatacept for seronegative RA, the reason why she stopped Orencia is because she saw [facility] rheumatologist who told her that she does not have RA.      HPI   EDEMA       Duration: off and on since December     Description (location/character/radiation): Bilateral knee swelling- right knee is worse.     Intensity:  8/10 the past 2 nights- is hard to sleep     Accompanying signs and symptoms: She saw ortho the beginning of January, had an mri and xrays done through [facility]. She had 3 HA and steroid  injections done on both of her knees. After 3rd injection, her right knee swelled. Waited a week and went in to have the right knee aspirated and another steroid injection. AND a week later the left knee swelled up and had the same thing done. They sent the fluid into the lab to be tested. WBC was elevated.     History (similar episodes/previous evaluation): YES     Precipitating or alleviating factors: ice, ibuprofen, elevating her legs     Therapies tried and outcome: ice, ibuprofen, staying off her feet, elevating her legs        Reviewed and updated as needed this visit by Provider  Allergies  Meds         Review of Systems   ROS COMP: Constitutional, HEENT, cardiovascular, pulmonary, gi and gu systems are negative, except as otherwise noted.      Objective    /74 (BP Location: Right arm, Patient Position:  "Sitting, Cuff Size: Adult Regular)   Pulse 87   Temp 98.1  F (36.7  C) (Tympanic)   Resp 16   Ht 1.657 m (5' 5.25\")   Wt 69.6 kg (153 lb 6.4 oz)   LMP 02/20/2015 (Exact Date)   SpO2 95%   BMI 25.33 kg/m    Body mass index is 25.33 kg/m .  Physical Exam   GENERAL: healthy, alert and no distress  MS: right knee moderate edema, medial side somewhat tender to palpation, full ROM  SKIN: no suspicious lesions or rashes  NEURO: Normal strength and tone, mentation intact and speech normal  PSYCH: mentation appears normal, affect normal/bright    Diagnostic Test Results:  Labs reviewed in Epic  Results for orders placed or performed in visit on 03/19/20   TSH with free T4 reflex     Status: Abnormal   Result Value Ref Range    TSH 9.03 (H) 0.40 - 4.00 mU/L   CRP, inflammation     Status: Abnormal   Result Value Ref Range    CRP Inflammation 15.6 (H) 0.0 - 8.0 mg/L   Erythrocyte sedimentation rate auto     Status: None   Result Value Ref Range    Sed Rate 14 0 - 30 mm/h   CBC with platelets and differential     Status: None   Result Value Ref Range    WBC 7.8 4.0 - 11.0 10e9/L    RBC Count 4.45 3.8 - 5.2 10e12/L    Hemoglobin 14.2 11.7 - 15.7 g/dL    Hematocrit 43.1 35.0 - 47.0 %    MCV 97 78 - 100 fl    MCH 31.9 26.5 - 33.0 pg    MCHC 32.9 31.5 - 36.5 g/dL    RDW 14.7 10.0 - 15.0 %    Platelet Count 250 150 - 450 10e9/L    % Neutrophils 47.8 %    % Lymphocytes 41.1 %    % Monocytes 9.1 %    % Eosinophils 1.7 %    % Basophils 0.3 %    Absolute Neutrophil 3.8 1.6 - 8.3 10e9/L    Absolute Lymphocytes 3.2 0.8 - 5.3 10e9/L    Absolute Monocytes 0.7 0.0 - 1.3 10e9/L    Absolute Eosinophils 0.1 0.0 - 0.7 10e9/L    Absolute Basophils 0.0 0.0 - 0.2 10e9/L    Diff Method Automated Method    T4 free     Status: None   Result Value Ref Range    T4 Free 1.17 0.76 - 1.46 ng/dL           Assessment & Plan     1. Bilateral knee swelling  -knee osteoarthritis vs spondyloarthritis, recommended rheumatology consult    - TSH with free " T4 reflex  - CRP, inflammation  - Erythrocyte sedimentation rate auto  - celecoxib (CELEBREX) 100 MG capsule; Take 1 capsule (100 mg) by mouth 2 times daily  Dispense: 60 capsule; Refill: 3  - CBC with platelets and differential  - HYDROcodone-acetaminophen (NORCO) 5-325 MG tablet; Take 1 tablet by mouth every 6 hours as needed for severe pain  Dispense: 20 tablet; Refill: 0  - RHEUMATOLOGY REFERRAL    2. Hypothyroidism, unspecified type    - levothyroxine (SYNTHROID/LEVOTHROID) 125 MCG tablet; Take 125 mcg every day except twice per week, Mondays and Sundays take 137 mcg.  Dispense: 30 tablet; Refill: 3  - levothyroxine (SYNTHROID/LEVOTHROID) 137 MCG tablet; Take 125 mcg every day except twice per week, Mondays and Sundays take 137 mcg.  Dispense: 10 tablet; Refill: 3  - TSH with free T4 reflex FUTURE 2mo; Future    3. Special screening for malignant neoplasms, colon    - Fecal colorectal cancer screen (FIT); Future           See Patient Instructions      RADHA Ernandez [facility]

## 2020-03-19 NOTE — TELEPHONE ENCOUNTER
Reason for Call: knee pain    Detailed comments: patient is calling and stating that she went thru the [facility] for testing and it turns out, she does not have RA, and has been on medication for this for the last 10 years. She has been seeing ortho, and they have taken fluid off of her knee a few times and last time tested it, as it was cloudy. They told her it had a high white count. Patient is wondering what or who she should see now. Please advise.    Phone Number Patient can be reached at: Home number on file [phone] (home)    Best Time: any    Can we leave a detailed message on this number? YES   Eloisa Sultana  [facility] [place]       Call taken on 3/19/2020 at 10:34 AM by Eloisa Street

## 2020-03-19 NOTE — PATIENT INSTRUCTIONS
Possible spondyloarthritis?  Synovial fluid analysis negative for crystals which means you don't have gout or pseudogout  WBC can be elevated due to other inflammatory conditions not neccessary due to infection  Bacterial culture was negative which is good     Will check labs today: inflammatory markers, CBC.     Will also check thyroid function     Recommend to try Celebrex 100 mg twice daily    Norco as needed for severe pain

## 2020-03-19 NOTE — TELEPHONE ENCOUNTER
Patient states Kaibeto decided she does not have RA (back in June), Orencia was stopped.  July she started to have bilateral knee problems.  Saw ortho - steroid inj and (3) HA injections were done.  After 3rd injection, RT knee swelled - clear fluid was aspirated.  Then Left knee swelled - cloudy fluid aspirated, tested and elevated WBC was noted.  Patient states ortho said, see your RA Doc who she does not see anymore.  She did not feel ortho was helpful with this recommendation.  Currently she has bilateral knee swelling again, Right worse than left.  Denies calf tenderness, pain, redness or warmth.  Appt scheduled for this afternoon.  Stefanie GASPAR RN

## 2020-03-20 RX ORDER — LEVOTHYROXINE SODIUM 137 UG/1
TABLET ORAL
Qty: 10 TABLET | Refills: 3 | Status: SHIPPED | OUTPATIENT
Start: 2020-03-20 | End: 2020-05-21

## 2020-03-20 RX ORDER — LEVOTHYROXINE SODIUM 125 UG/1
TABLET ORAL
Qty: 30 TABLET | Refills: 3 | Status: SHIPPED | OUTPATIENT
Start: 2020-03-20 | End: 2020-06-09

## 2020-03-23 ENCOUNTER — OFFICE VISIT (OUTPATIENT)
Dept: FAMILY MEDICINE | Facility: CLINIC | Age: 55
End: 2020-03-23
Payer: COMMERCIAL

## 2020-03-23 ENCOUNTER — HOSPITAL ENCOUNTER (OUTPATIENT)
Dept: ULTRASOUND IMAGING | Facility: CLINIC | Age: 55
Discharge: HOME | End: 2020-03-23
Attending: INTERNAL MEDICINE | Admitting: INTERNAL MEDICINE
Payer: COMMERCIAL

## 2020-03-23 VITALS
OXYGEN SATURATION: 93 % | HEIGHT: 65 IN | TEMPERATURE: 98.4 F | SYSTOLIC BLOOD PRESSURE: 114 MMHG | DIASTOLIC BLOOD PRESSURE: 68 MMHG | HEART RATE: 75 BPM | WEIGHT: 154 LBS | BODY MASS INDEX: 25.66 KG/M2

## 2020-03-23 DIAGNOSIS — M71.22 BAKER'S CYST OF KNEE, LEFT: Primary | ICD-10-CM

## 2020-03-23 PROCEDURE — 99214 OFFICE O/P EST MOD 30 MIN: CPT | Performed by: INTERNAL MEDICINE

## 2020-03-23 PROCEDURE — 93971 EXTREMITY STUDY: CPT | Mod: LT

## 2020-03-23 ASSESSMENT — MIFFLIN-ST. JEOR: SCORE: 1293.38

## 2020-03-23 NOTE — PROGRESS NOTES
Subjective     Tequila Neumann is a 55 year old female who presents to clinic today for the following health issues:      Chief Complaint   Patient presents with     Musculoskeletal Problem     left leg area of swelling, pain , warmth. patient is worried about possible blood clot .          Onset: x worse 3 weeks     Description:   Location: Left leg, upper medial calf area   Character: Sharp and Cramping, on and off    Intensity: mild to severe     Progression of Symptoms: worse in the mornings     Accompanying Signs & Symptoms:  Other symptoms: warmth, swelling- firm and tender lump this morning, and redness    History:   Previous similar pain: no       Precipitating factors:   Trauma or overuse: thinks she may be trying to compensate for her right knee pain    Alleviating factors:  Improved by: nothing    Therapies Tried and outcome: prescribed meds - no relief       Deana was seen on 3/19 for R>L knee swelling.  She has been seeing ortho and had steroid injections in both knees.  Arthrocentesis has been performed without crystals or infection.  She was referred to rheum for possible inflammatory arthritis and given a small rx for Norco on 3/19 as well as Celebrex.  She also has prescriptions for duloxetine, gabapentin, and Robaxin.        Patient Active Problem List   Diagnosis     ESSENTIAL TREMOR      Migraine     Hypothyroidism     Tobacco use disorder     RA (rheumatoid arthritis) (H)     CARDIOVASCULAR SCREENING; LDL GOAL LESS THAN 160     Hx of LASIK     Nutcracker esophagus     Insomnia     Cervical dystonia     Symptomatic menopausal or female climacteric states     ASCUS with positive high risk HPV cervical     Myofascial muscle pain     Seronegative rheumatoid arthritis (H)     Torticollis, spasmodic     Avoids wheat/gluten and refined sugar and feeling better     EGD suggestive of achalasia     Familial dystonia     Tobacco dependence due to cigarettes     Personal history of other diseases of the  digestive system     Fibromyalgia     Major depressive disorder, single episode, moderate (H)     Essential tremor     Anxiety     Hyperlipidemia LDL goal <100     Past Surgical History:   Procedure Laterality Date     ARTHROSCOPY KNEE RT/LT Left 12/08/2005    Left medial meniscal tear bilat     COLONOSCOPY  7/11/2014    Procedure: COLONOSCOPY;  Surgeon: Randell Mcgarry MD;  Location: [facility]     D & C  x 2     ESOPHAGOSCOPY, GASTROSCOPY, DUODENOSCOPY (EGD), COMBINED  7/16/2012    Procedure: COMBINED ESOPHAGOSCOPY, GASTROSCOPY, DUODENOSCOPY (EGD);;  Surgeon: Lito Kwon MD;  Location:  GI     HC ESOPH/GAS REFLUX TEST W NASAL IMPED ELECTRODE  8/23/2012    Procedure: ESOPHAGEAL IMPEDENCE FUNCTION TEST 1 HOUR OR LESS;  Surgeon: Tequila Boone MD;  Location:  GI     LAPAROSCOPIC CHOLECYSTECTOMY  1/25/2012    Procedure:LAPAROSCOPIC CHOLECYSTECTOMY; Laparoscopic vs Open Cholecystectomy; Surgeon:RANDELL MCGARRY; Location:[facility] OR     LASIK Bilateral 2000     LEEP TX, CERVICAL  1985     TUBAL LIGATION  1998       Social History     Tobacco Use     Smoking status: Current Every Day Smoker     Packs/day: 0.50     Years: 30.00     Pack years: 15.00     Types: Cigarettes     Smokeless tobacco: Never Used     Tobacco comment: 1/2 ppd   Substance Use Topics     Alcohol use: Yes     Comment: 3 drinks/week - helps      Family History   Problem Relation Age of Onset     C.A.D. Father      Diabetes Father      Hypertension Father      Cerebrovascular Disease Father      Lipids Father      Breast Cancer Maternal Grandmother         age 80+     Alzheimer Disease Maternal Grandmother         age 70+     Cancer Mother         sarcoma, small intestinal     Neurologic Disorder Mother         tremor     Tremor Mother      Dystonia Mother      Colon Polyps Sister      Other - See Comments Sister         no clear stroke     Dystonia Daughter      Cancer - colorectal No family hx of      Prostate  Cancer No family hx of          Current Outpatient Medications   Medication Sig Dispense Refill     celecoxib (CELEBREX) 100 MG capsule Take 1 capsule (100 mg) by mouth 2 times daily 60 capsule 3     cholecalciferol (VITAMIN D) 1000 UNIT tablet Take 1 tablet by mouth daily.       [employer] NICOTINE 14 MG/24HR 24 hr patch As needed  3     DULoxetine (CYMBALTA) 30 MG capsule Take 1 capsule (30 mg) by mouth daily 30 capsule 5     gabapentin (NEURONTIN) 100 MG capsule TAKE 4 CAPSULES BY MOUTH EVERY DAY AT BEDTIME (Patient taking differently: Patient has been taking 1 capsule at bedtime) 120 capsule 2     HYDROcodone-acetaminophen (NORCO) 5-325 MG tablet Take 1 tablet by mouth every 6 hours as needed for severe pain 20 tablet 0     ketoconazole (NIZORAL) 2 % external cream As needed       levothyroxine (SYNTHROID/LEVOTHROID) 125 MCG tablet Take 125 mcg every day except twice per week, Mondays and Sundays take 137 mcg. 30 tablet 3     levothyroxine (SYNTHROID/LEVOTHROID) 137 MCG tablet Take 125 mcg every day except twice per week, Mondays and Sundays take 137 mcg. 10 tablet 3     methocarbamol (ROBAXIN) 500 MG tablet 1-2 x 500mg tab by mouth nightly as needed 180 tablet 3     propranolol (INDERAL) 60 MG tablet Take 1 tablet (60 mg) by mouth 2 times daily 60 tablet 5     traZODone (DESYREL) 50 MG tablet Take 50 mg by mouth       Allergies   Allergen Reactions     Methotrexate Hives     Primidone      Made her feel strange and out of it     Sulfa Drugs Nausea     Topamax      Personality change; memory issues     Topiramate Other (See Comments)     Personality change. Memory   Personality change; memory issues     Plaquenil [Hydroxychloroquine Sulfate] Rash       Reviewed and updated as needed this visit by Provider         Review of Systems   ROS COMP: Constitutional, MSK systems are negative, except as otherwise noted.      Objective    /68 (BP Location: Right arm, Patient Position: Chair, Cuff Size: Adult Regular)    "Pulse 75   Temp 98.4  F (36.9  C) (Tympanic)   Ht 1.657 m (5' 5.25\")   Wt 69.9 kg (154 lb)   LMP 02/20/2015 (Exact Date)   SpO2 93%   Breastfeeding No   BMI 25.43 kg/m    Body mass index is 25.43 kg/m .  Physical Exam   GENERAL: healthy, alert and no distress  MS: left medial calf swelling noted without erythema but with some tenderness, mild generalized lower leg edema noted.  Calf diameter 34cm on R and 34.5cm on L    Diagnostic Test Results:  Labs reviewed in Epic  Results for orders placed or performed in visit on 03/23/20   US Lower Extremity Venous Duplex Left     Status: None    Narrative    US LOWER EXTREMITY VENOUS DUPLEX LEFT 3/23/2020 12:38 PM    HISTORY: Calf pain. Lump.    TECHNIQUE: Color flow and doppler spectral waveform analysis of deep  venous structures is performed.  Imaged deep venous structures of the  left lower extremity include the left common femoral vein, femoral  vein, popliteal vein, and visualized posterior deep calf veins.    COMPARISON: None.    FINDINGS: No DVT is demonstrated. There is a a complex structure in  the popliteal fossa that measures 4.6 x 3.2 cm. This is most  consistent with a complex/collapsed Baker's cyst. A mass would be  considered less likely. Down lower at the level of the mid calf an  elliptical shaped fluid collection is present that measures 7.1 x 1.5  x 4.6 cm. This does not obviously connect/communicate with the upper  lesion but a Baker's cyst extension at times can occur with a thin  neck which is not visualized with ultrasound.    Impression    IMPRESSION:  1. Negative left lower extremity venous Doppler for DVT.  2. A complex Baker's cyst is present, as described above    SHERWIN CHIN MD           Assessment & Plan     1. Baker's cyst of knee, left    Deana presents with 3 weeks of left calf pain and swelling.  Stat U/S was negative for DVT but shows Baker's cyst and fluid collect in calf, likely resultant from Baker's cyst.  Cyst in knee is " collapsed, so I'm not sure how much fluid aspiration would be able to get off, but advised her to f/u with ortho to see if they would recommend procedure.  Otherwise continue home cares.     - US Lower Extremity Venous Duplex Left     Kanu Romero MD  [facility]

## 2020-03-23 NOTE — PATIENT INSTRUCTIONS
Thank you for choosing [facility]  You may be receiving an email and/or telephone survey request from [facility] Customer Experience regarding your visit today.  Please take a few minutes to respond to the survey to let us know how we are doing.      If you have questions or concerns, please contact us via [employer] or you can contact your care team at [phone].    Our Clinic hours are:  Monday 6:40 am  to 7:00 pm  Tuesday -Friday 6:40 am to 5:00 pm    The [place] outpatient lab hours are:  Monday - Friday 6:10 am to 4:45 pm  Saturdays 7:00 am to 11:00 am  Appointments are required, call [phone]    If you have clinical questions after hours or would like to schedule an appointment,  call the clinic at [phone].    Patient Education     Damon s Cyst    You have a Baker s cyst. This is a lump or bulge in the back of your knee. It is caused when extra joint fluid flows into a small sac behind the knee. The extra fluid occurs because arthritis or a torn cartilage irritates the knee joint.  A small Damon s cyst often has no symptoms. A larger cyst can cause some knee pain or a feeling of pressure behind your knee when you try to fully straighten or bend that joint. A Baker s cyst can leak, leading fluid to move down into your lower leg. This causes swelling, pain, and redness.  Treatment may involve draining the extra fluid. Or medicine may be injected to reduce redness and swelling. If the extra fluid is caused by a torn cartilage, then surgery to repair the cartilage may be the best treatment option. If arthritis is the cause, and it does not get better with treatment, surgery may need to address the arthritis and cyst.  Home care    If you have knee pain, stay off the affected leg as much as possible until the pain eases.    Apply an ice pack to the painful area for no more than 20 minutes. Do this every 3 to 6 hours for the first 24 to 48 hours. Keep using ice packs 3 to 4 times a day for the  next few days, as needed for pain. To make an ice pack, put ice cubes in a sealed zip-lock plastic bag. Wrap the bag in a clean, thin towel or cloth. Never put ice or an ice pack directly on the skin.    If you were given a hook-and-loop knee brace, you may open the brace to apply ice. Unless told otherwise, you may remove the brace to bathe and sleep.    You may use over-the-counter pain medicine to control pain, unless another medicine was prescribed. Talk with your provider before using these medicines if you have chronic liver or kidney disease, or have ever had a stomach ulcer or gastrointestinal bleeding.       If crutches or a walker have been recommended, don t bear full weight on your injured leg until you can do so without pain. Check with your provider before returning to sports or full work duties.    Follow-up care  Follow up with your healthcare provider within 1 to 2 weeks, or as advised.  If X-rays were taken, you will be notified of any new findings that may affect your care.  When to seek medical advice  Call your healthcare provider right away if any of these occur:    Toes or foot become swollen, cold, blue, numb or tingly    Pain or swelling increases    Warmth or redness appears over the knee    Redness, swelling or pain occurs in the calf or lower leg    Fever or chills  Date Last Reviewed: 5/1/2018 2000-2019 [employer]. [address] [place] [id number]. All rights reserved. This information is not intended as a substitute for professional medical care. Always follow your healthcare professional's instructions.

## 2020-03-24 ENCOUNTER — TELEPHONE (OUTPATIENT)
Dept: FAMILY MEDICINE | Facility: CLINIC | Age: 55
End: 2020-03-24

## 2020-03-24 DIAGNOSIS — M17.10 ARTHRITIS OF KNEE: ICD-10-CM

## 2020-03-24 DIAGNOSIS — M79.7 FIBROMYALGIA: ICD-10-CM

## 2020-03-24 DIAGNOSIS — M25.461 BILATERAL KNEE SWELLING: Primary | ICD-10-CM

## 2020-03-24 DIAGNOSIS — M25.462 BILATERAL KNEE SWELLING: Primary | ICD-10-CM

## 2020-03-24 DIAGNOSIS — G24.3 CERVICAL DYSTONIA: ICD-10-CM

## 2020-03-24 RX ORDER — GABAPENTIN 100 MG/1
CAPSULE ORAL
Qty: 360 CAPSULE | Refills: 0 | Status: SHIPPED | OUTPATIENT
Start: 2020-03-24 | End: 2021-01-22

## 2020-03-24 NOTE — TELEPHONE ENCOUNTER
Routing refill request to provider for review/approval because:  Drug not on the [facility] refill protocol     Requests prednisone as discussed at clinic visit. States she is still having problem with knees but cannot get in to rheumatology until end of June.      JAYLEEN CrawfordN, RN

## 2020-03-24 NOTE — TELEPHONE ENCOUNTER
Reason for Call:  Medication or medication refill:    Do you use a [facility]?  Name of the pharmacy and phone number for the current request:  [facility] [phone]    Name of the medication requested: Prednisone - Pt states that she saw Emma in clinic 3/19 and was referred to a Rheumatologist in June.  Pt wants Rx for prednisone to get her through until then?  Please call patient and advise.      Other request:     Can we leave a detailed message on this number? YES    Phone number patient can be reached at: Home number on file [phone] (home)    Best Time: any    Call taken on 3/24/2020 at 1:01 PM by Celeste Marks

## 2020-03-25 RX ORDER — PREDNISONE 10 MG/1
TABLET ORAL
Qty: 40 TABLET | Refills: 0 | Status: SHIPPED | OUTPATIENT
Start: 2020-03-25 | End: 2020-04-17

## 2020-03-25 NOTE — TELEPHONE ENCOUNTER
I signed prescription for Prednisone: 40 mg daily for 5 days, then 30 mg daily for 3 days, 20 mg daily for 3 days and 10 mg daily for 5 more days and stop.  Stop Celebrex while taking Prednisone.    RADHA Ernandez CNP

## 2020-04-17 ENCOUNTER — VIRTUAL VISIT (OUTPATIENT)
Dept: FAMILY MEDICINE | Facility: CLINIC | Age: 55
End: 2020-04-17
Payer: COMMERCIAL

## 2020-04-17 DIAGNOSIS — G89.4 CHRONIC PAIN SYNDROME: ICD-10-CM

## 2020-04-17 DIAGNOSIS — M19.90 INFLAMMATORY ARTHRITIS: Primary | ICD-10-CM

## 2020-04-17 PROCEDURE — 99213 OFFICE O/P EST LOW 20 MIN: CPT | Mod: GT | Performed by: NURSE PRACTITIONER

## 2020-04-17 RX ORDER — DICLOFENAC SODIUM 75 MG/1
75 TABLET, DELAYED RELEASE ORAL 2 TIMES DAILY PRN
Qty: 60 TABLET | Refills: 1 | Status: SHIPPED | OUTPATIENT
Start: 2020-04-17 | End: 2021-08-05

## 2020-04-17 RX ORDER — TRAMADOL HYDROCHLORIDE 50 MG/1
50 TABLET ORAL DAILY PRN
Qty: 30 TABLET | Refills: 0 | Status: SHIPPED | OUTPATIENT
Start: 2020-04-17 | End: 2020-04-24

## 2020-04-17 NOTE — PROGRESS NOTES
"Tequila Neumann is a 55 year old female who is being evaluated via a billable telephone visit.      The patient has been notified of following:     \"This telephone visit will be conducted via a call between you and your physician/provider. We have found that certain health care needs can be provided without the need for a physical exam.  This service lets us provide the care you need with a short phone conversation.  If a prescription is necessary we can send it directly to your pharmacy.  If lab work is needed we can place an order for that and you can then stop by our lab to have the test done at a later time.    Telephone visits are billed at different rates depending on your insurance coverage. During this emergency period, for some insurers they may be billed the same as an in-person visit.  Please reach out to your insurance provider with any questions.    If during the course of the call the physician/provider feels a telephone visit is not appropriate, you will not be charged for this service.\"    Patient has given verbal consent for Telephone visit?  Yes    How would you like to obtain your AVS? MyChart    Subjective     Tequila Neumann is a 55 year old female who presents to clinic today for the following health issues:    Musculoskeletal problem/pain      Duration: years     Description  Location: shoulders, neck, knees, low back     Intensity:  Moderate to severe     Accompanying signs and symptoms: none    History  Previous similar problem: YES  Previous evaluation:  MRI on neck and knees     Precipitating or alleviating factors:  Trauma or overuse: no   Aggravating factors include: sitting, standing, walking, climbing stairs, lifting, exercise and overuse    Therapies tried and outcome: prednisone- this was helpful. Celebrex - helps a little, Gabapentin         Reviewed and updated as needed this visit by Provider         Review of Systems   ROS COMP: Constitutional, HEENT, cardiovascular, pulmonary, " gi and gu systems are negative, except as otherwise noted.       Objective   Reported vitals:  LMP 02/20/2015 (Exact Date)    PSYCH: Alert and oriented times 3; coherent speech, normal   rate and volume, able to articulate logical thoughts, able   to abstract reason, no tangential thoughts, no hallucinations   or delusions  Her affect is normal  RESP: No cough, no audible wheezing, able to talk in full sentences  Remainder of exam unable to be completed due to telephone visits    Diagnostic Test Results:  Labs reviewed in Epic        Assessment/Plan:  1. Inflammatory arthritis  -ice packs as needed   - diclofenac (VOLTAREN) 75 MG EC tablet; Take 1 tablet (75 mg) by mouth 2 times daily as needed for moderate pain  Dispense: 60 tablet; Refill: 1  - traMADol (ULTRAM) 50 MG tablet; Take 1 tablet (50 mg) by mouth daily as needed for moderate to severe pain or severe pain  Dispense: 30 tablet; Refill: 0  -follow up with rheumatologist as scheduled   -the patient will let me know through my chart if she still having pain with current treatment plan in 1-2 weeks     2. Chronic pain syndrome    - diclofenac (VOLTAREN) 75 MG EC tablet; Take 1 tablet (75 mg) by mouth 2 times daily as needed for moderate pain  Dispense: 60 tablet; Refill: 1  - traMADol (ULTRAM) 50 MG tablet; Take 1 tablet (50 mg) by mouth daily as needed for moderate to severe pain or severe pain  Dispense: 30 tablet; Refill: 0        Phone call duration:  15 minutes    RADHA Ernandez CNP

## 2020-04-23 ENCOUNTER — MYC MEDICAL ADVICE (OUTPATIENT)
Dept: FAMILY MEDICINE | Facility: CLINIC | Age: 55
End: 2020-04-23

## 2020-04-23 DIAGNOSIS — G89.29 CHRONIC PAIN OF BOTH KNEES: ICD-10-CM

## 2020-04-23 DIAGNOSIS — M19.90 INFLAMMATORY ARTHRITIS: Primary | ICD-10-CM

## 2020-04-23 DIAGNOSIS — M25.562 CHRONIC PAIN OF BOTH KNEES: ICD-10-CM

## 2020-04-23 DIAGNOSIS — G89.4 CHRONIC PAIN SYNDROME: ICD-10-CM

## 2020-04-23 DIAGNOSIS — M25.561 CHRONIC PAIN OF BOTH KNEES: ICD-10-CM

## 2020-04-23 DIAGNOSIS — G47.00 INSOMNIA, UNSPECIFIED TYPE: ICD-10-CM

## 2020-04-23 NOTE — TELEPHONE ENCOUNTER
See [employer] message. Pt had a Virtual Visit 4/17/2020 addressing chronic pain and inflammatory arthritis.    Larissa HEAD RN, BSN

## 2020-04-24 RX ORDER — HYDROXYZINE PAMOATE 25 MG/1
25 CAPSULE ORAL
Qty: 30 CAPSULE | Refills: 1 | Status: SHIPPED | OUTPATIENT
Start: 2020-04-24 | End: 2020-06-30

## 2020-04-24 RX ORDER — PREDNISONE 5 MG/1
TABLET ORAL
Qty: 60 TABLET | Refills: 1 | Status: SHIPPED | OUTPATIENT
Start: 2020-04-24 | End: 2021-01-22

## 2020-04-24 RX ORDER — TRAMADOL HYDROCHLORIDE 50 MG/1
50 TABLET ORAL 2 TIMES DAILY PRN
Qty: 30 TABLET | Refills: 0 | Status: SHIPPED | OUTPATIENT
Start: 2020-04-24 | End: 2020-05-22

## 2020-04-28 ENCOUNTER — TRANSFERRED RECORDS (OUTPATIENT)
Dept: HEALTH INFORMATION MANAGEMENT | Facility: CLINIC | Age: 55
End: 2020-04-28

## 2020-05-04 ENCOUNTER — TRANSFERRED RECORDS (OUTPATIENT)
Dept: HEALTH INFORMATION MANAGEMENT | Facility: CLINIC | Age: 55
End: 2020-05-04

## 2020-05-13 ENCOUNTER — MYC MEDICAL ADVICE (OUTPATIENT)
Dept: FAMILY MEDICINE | Facility: CLINIC | Age: 55
End: 2020-05-13

## 2020-05-14 NOTE — TELEPHONE ENCOUNTER
Recommend follow-up with PCP, appears she is seeing face to face next Wed, otherwise, if patient feels she cannot wait, next available face to face visit

## 2020-05-14 NOTE — TELEPHONE ENCOUNTER
See [employer]. Virtual visit?  Per 04/17/20 with PCP  1. Inflammatory arthritis  -ice packs as needed   - diclofenac (VOLTAREN) 75 MG EC tablet; Take 1 tablet (75 mg) by mouth 2 times daily as needed for moderate pain  Dispense: 60 tablet; Refill: 1  - traMADol (ULTRAM) 50 MG tablet; Take 1 tablet (50 mg) by mouth daily as needed for moderate to severe pain or severe pain  Dispense: 30 tablet; Refill: 0  -follow up with rheumatologist as scheduled   -the patient will let me know through my chart if she still having pain with current treatment plan in 1-2 weeks      JAYLEEN CrawfordN, RN

## 2020-05-21 ENCOUNTER — MYC REFILL (OUTPATIENT)
Dept: FAMILY MEDICINE | Facility: CLINIC | Age: 55
End: 2020-05-21

## 2020-05-21 DIAGNOSIS — E03.9 HYPOTHYROIDISM, UNSPECIFIED TYPE: ICD-10-CM

## 2020-05-21 RX ORDER — LEVOTHYROXINE SODIUM 137 UG/1
TABLET ORAL
Qty: 10 TABLET | Refills: 3 | Status: SHIPPED | OUTPATIENT
Start: 2020-05-21 | End: 2020-06-02

## 2020-05-21 NOTE — TELEPHONE ENCOUNTER
"Requested Prescriptions   Pending Prescriptions Disp Refills     levothyroxine (SYNTHROID/LEVOTHROID) 137 MCG tablet 10 tablet 3     Sig: Take 125 mcg every day except twice per week, Mondays and Sundays take 137 mcg.       Thyroid Protocol Failed - 5/21/2020  1:14 PM        Failed - Normal TSH on file in past 12 months     Recent Labs   Lab Test 03/19/20  1454   TSH 9.03*              Passed - Patient is 12 years or older        Passed - Recent (12 mo) or future (30 days) visit within the authorizing provider's specialty     Patient has had an office visit with the authorizing provider or a provider within the authorizing providers department within the previous 12 mos or has a future within next 30 days. See \"Patient Info\" tab in inbasket, or \"Choose Columns\" in Meds & Orders section of the refill encounter.              Passed - Medication is active on med list        Passed - No active pregnancy on record     If patient is pregnant or has had a positive pregnancy test, please check TSH.          Passed - No positive pregnancy test in past 12 months     If patient is pregnant or has had a positive pregnancy test, please check TSH.               Routing refill request to provider for review/approval because:  Labs out of range:  TSH          "

## 2020-05-22 DIAGNOSIS — G89.4 CHRONIC PAIN SYNDROME: ICD-10-CM

## 2020-05-22 DIAGNOSIS — M19.90 INFLAMMATORY ARTHRITIS: ICD-10-CM

## 2020-05-22 DIAGNOSIS — M25.562 CHRONIC PAIN OF BOTH KNEES: ICD-10-CM

## 2020-05-22 DIAGNOSIS — M25.561 CHRONIC PAIN OF BOTH KNEES: ICD-10-CM

## 2020-05-22 DIAGNOSIS — G89.29 CHRONIC PAIN OF BOTH KNEES: ICD-10-CM

## 2020-05-22 RX ORDER — TRAMADOL HYDROCHLORIDE 50 MG/1
TABLET ORAL
Qty: 30 TABLET | Refills: 0 | Status: SHIPPED | OUTPATIENT
Start: 2020-05-22 | End: 2021-01-22

## 2020-05-22 NOTE — TELEPHONE ENCOUNTER
Routing refill request to provider for review/approval because:  Drug not on the [facility] refill protocol   4 weeks ago (4/24/2020)    traMADol (ULTRAM) 50 MG tablet    Take 1 tablet (50 mg) by mouth 2 times daily as needed for moderate to severe pain or severe pain    Dispense: 30 tablet     Refills: 0     Start: 4/24/2020      By: Emma Beltran APRN CNP       Last office visit: 3/23/2020   Future Office Visit:  None     JAYLEEN CrawfordN, RN

## 2020-06-01 ENCOUNTER — MYC MEDICAL ADVICE (OUTPATIENT)
Dept: FAMILY MEDICINE | Facility: CLINIC | Age: 55
End: 2020-06-01

## 2020-06-01 DIAGNOSIS — E03.9 HYPOTHYROIDISM, UNSPECIFIED TYPE: ICD-10-CM

## 2020-06-02 NOTE — TELEPHONE ENCOUNTER
See patient's Guidehart message. The E-Consult with Endocrinology is available to review via Care Everywhere.     Ok to send daily 137 mcg (and remove 125 mcg from med list)? Or defer to Endocrinologist?     JAYLEEN CrawfordN, RN

## 2020-06-03 RX ORDER — LEVOTHYROXINE SODIUM 137 UG/1
137 TABLET ORAL DAILY
Qty: 90 TABLET | Refills: 1 | Status: SHIPPED | OUTPATIENT
Start: 2020-06-03 | End: 2020-07-13

## 2020-06-03 NOTE — TELEPHONE ENCOUNTER
Will keep 125 mcg on med list for now, will defer dosingto endocrinologist.    RADHA Ernandez CNP

## 2020-06-09 ENCOUNTER — MYC MEDICAL ADVICE (OUTPATIENT)
Dept: FAMILY MEDICINE | Facility: CLINIC | Age: 55
End: 2020-06-09

## 2020-06-09 ENCOUNTER — MYC REFILL (OUTPATIENT)
Dept: FAMILY MEDICINE | Facility: CLINIC | Age: 55
End: 2020-06-09

## 2020-06-09 DIAGNOSIS — E03.9 HYPOTHYROIDISM, UNSPECIFIED TYPE: ICD-10-CM

## 2020-06-09 RX ORDER — LEVOTHYROXINE SODIUM 125 UG/1
TABLET ORAL
Qty: 30 TABLET | Refills: 0 | Status: SHIPPED | OUTPATIENT
Start: 2020-06-09 | End: 2020-07-13

## 2020-06-09 RX ORDER — LEVOTHYROXINE SODIUM 125 UG/1
TABLET ORAL
Qty: 30 TABLET | Refills: 3 | Status: CANCELLED | OUTPATIENT
Start: 2020-06-09

## 2020-06-09 RX ORDER — LEVOTHYROXINE SODIUM 137 UG/1
137 TABLET ORAL DAILY
Qty: 90 TABLET | Refills: 1 | Status: CANCELLED | OUTPATIENT
Start: 2020-06-09

## 2020-06-09 NOTE — TELEPHONE ENCOUNTER
Provider covering for Emma,    See my chart message from the patient about lab testing at the [facility] and needing refills on her synthroid medication.  We recently refilled the synthroid 137mcg and we have my charted this to her.  We also requested her to get the labs sent to us for her chart here. Synthroid 125 mcg is pended for your consideration. Elina ZARAGOZA RN

## 2020-06-29 DIAGNOSIS — G47.00 INSOMNIA, UNSPECIFIED TYPE: ICD-10-CM

## 2020-06-30 RX ORDER — HYDROXYZINE PAMOATE 25 MG/1
CAPSULE ORAL
Qty: 30 CAPSULE | Refills: 4 | Status: SHIPPED | OUTPATIENT
Start: 2020-06-30 | End: 2020-12-21

## 2020-07-13 DIAGNOSIS — E03.9 HYPOTHYROIDISM, UNSPECIFIED TYPE: ICD-10-CM

## 2020-07-13 NOTE — TELEPHONE ENCOUNTER
Patient is currently on 125 mg daily except for Saturday and Sunday takes 137 mg daily.  Refilled both of these to cover one month.  Ordered TSH for retesting to see if levels are within normal range.  Have patient make a lab appointment to get this completed and then we can renew medications at currently doses ongoing if normal.  Mónica Butt NP on 7/13/2020 at 1:11 PM

## 2020-07-14 NOTE — TELEPHONE ENCOUNTER
Left message on answering machine for patient to call back. (ok to relay note below. )     Rosa Bradford RNC

## 2020-07-15 RX ORDER — LEVOTHYROXINE SODIUM 125 UG/1
TABLET ORAL
Qty: 30 TABLET | Refills: 0 | Status: SHIPPED | OUTPATIENT
Start: 2020-07-15 | End: 2020-08-11

## 2020-07-15 RX ORDER — LEVOTHYROXINE SODIUM 137 UG/1
137 TABLET ORAL
Qty: 8 TABLET | Refills: 0 | Status: SHIPPED | OUTPATIENT
Start: 2020-07-16 | End: 2020-08-11

## 2020-07-15 NOTE — TELEPHONE ENCOUNTER
Notified her, and verified her dose.     One month sent in per Mónica's ok below, and patient agreed to call back to schedule lab only appt.   Rosa Bradford RNC

## 2020-08-10 DIAGNOSIS — E03.9 HYPOTHYROIDISM, UNSPECIFIED TYPE: ICD-10-CM

## 2020-08-10 LAB
T4 FREE SERPL-MCNC: 1.72 NG/DL (ref 0.76–1.46)
TSH SERPL DL<=0.005 MIU/L-ACNC: 0.22 MU/L (ref 0.4–4)

## 2020-08-10 PROCEDURE — 84439 ASSAY OF FREE THYROXINE: CPT | Performed by: FAMILY MEDICINE

## 2020-08-10 PROCEDURE — 84443 ASSAY THYROID STIM HORMONE: CPT | Performed by: FAMILY MEDICINE

## 2020-08-10 PROCEDURE — 36415 COLL VENOUS BLD VENIPUNCTURE: CPT | Performed by: FAMILY MEDICINE

## 2020-08-11 DIAGNOSIS — E03.9 HYPOTHYROIDISM, UNSPECIFIED TYPE: Primary | ICD-10-CM

## 2020-08-11 RX ORDER — LEVOTHYROXINE SODIUM 125 UG/1
125 TABLET ORAL DAILY
Qty: 90 TABLET | Refills: 1 | Status: SHIPPED | OUTPATIENT
Start: 2020-08-11 | End: 2020-10-20

## 2020-10-19 DIAGNOSIS — E03.9 HYPOTHYROIDISM, UNSPECIFIED TYPE: ICD-10-CM

## 2020-10-19 LAB — TSH SERPL DL<=0.005 MIU/L-ACNC: 1.08 MU/L (ref 0.4–4)

## 2020-10-19 PROCEDURE — 36415 COLL VENOUS BLD VENIPUNCTURE: CPT | Performed by: FAMILY MEDICINE

## 2020-10-19 PROCEDURE — 84443 ASSAY THYROID STIM HORMONE: CPT | Performed by: FAMILY MEDICINE

## 2020-10-20 DIAGNOSIS — E03.9 HYPOTHYROIDISM, UNSPECIFIED TYPE: ICD-10-CM

## 2020-10-20 RX ORDER — LEVOTHYROXINE SODIUM 125 UG/1
125 TABLET ORAL DAILY
Qty: 90 TABLET | Refills: 3 | Status: SHIPPED | OUTPATIENT
Start: 2020-10-20 | End: 2021-11-30

## 2020-11-06 ENCOUNTER — OFFICE VISIT (OUTPATIENT)
Dept: FAMILY MEDICINE | Facility: CLINIC | Age: 55
End: 2020-11-06
Payer: COMMERCIAL

## 2020-11-06 VITALS
TEMPERATURE: 98.7 F | SYSTOLIC BLOOD PRESSURE: 100 MMHG | BODY MASS INDEX: 26.09 KG/M2 | HEART RATE: 72 BPM | WEIGHT: 158 LBS | OXYGEN SATURATION: 93 % | DIASTOLIC BLOOD PRESSURE: 76 MMHG

## 2020-11-06 DIAGNOSIS — H10.401 CHRONIC CONJUNCTIVITIS OF RIGHT EYE, UNSPECIFIED CHRONIC CONJUNCTIVITIS TYPE: Primary | ICD-10-CM

## 2020-11-06 PROCEDURE — 99213 OFFICE O/P EST LOW 20 MIN: CPT | Performed by: NURSE PRACTITIONER

## 2020-11-06 RX ORDER — OFLOXACIN 3 MG/ML
1-2 SOLUTION/ DROPS OPHTHALMIC 4 TIMES DAILY
Qty: 5 ML | Refills: 0 | Status: SHIPPED | OUTPATIENT
Start: 2020-11-06 | End: 2020-11-13

## 2020-11-06 RX ORDER — FOLIC ACID 1 MG/1
TABLET ORAL
COMMUNITY
Start: 2020-09-25 | End: 2023-04-26

## 2020-11-06 RX ORDER — HYDROCODONE BITARTRATE AND ACETAMINOPHEN 5; 325 MG/1; MG/1
TABLET ORAL DAILY PRN
COMMUNITY
Start: 2020-03-19 | End: 2021-01-22

## 2020-11-06 RX ORDER — CELECOXIB 100 MG/1
100 CAPSULE ORAL DAILY PRN
COMMUNITY
Start: 2020-03-19 | End: 2022-01-10

## 2020-11-06 NOTE — PROGRESS NOTES
"Subjective     Tequila Neumann is a 55 year old female who presents to clinic today for the following health issues:    HPI         Eye(s) Problem  Onset/Duration: chronic for a year or more  Description:   Location: Left eye is \"gooey\", very dry- gets a sore in the corner of her eye that swells and then drains  Pain: no  Redness: YES  Accompanying Signs & Symptoms:  Discharge/mattering: YES  Swelling: no  Visual changes: not sure- strained from computer use  Fever: no  Nasal Congestion: no  Bothered by bright lights: YES, has always had that problem  History:  Trauma: no  Foreign body exposure: no  Wearing contacts: no  Precipitating or alleviating factors: None  Therapies tried and outcome: lubricating drops - helpful        Review of Systems   Constitutional, HEENT, cardiovascular, pulmonary, gi and gu systems are negative, except as otherwise noted.      Objective    /76 (BP Location: Right arm)   Pulse 72   Temp 98.7  F (37.1  C) (Tympanic)   Wt 71.7 kg (158 lb)   LMP 02/20/2015 (Exact Date)   SpO2 93%   BMI 26.09 kg/m    Body mass index is 26.09 kg/m .  Physical Exam   GENERAL: healthy, alert and no distress  EYES: Eyes grossly normal to inspection, PERRL and conjunctivae on the left are mildly erythematous            Assessment & Plan     Chronic conjunctivitis of right eye, unspecified chronic conjunctivitis type  Treat with antibiotic eye drops for one week  If no improvement, make an appointment with eye doctor.  - ofloxacin (OCUFLOX) 0.3 % ophthalmic solution; Place 1-2 drops Into the left eye 4 times daily for 7 days            Return in about 1 week (around 11/13/2020).    The risks, benefits and treatment options of prescribed medications or other treatments have been discussed with the patient. The patient verbalized their understanding and should call or follow up if no improvement or if they develop further problems.    RADHA Mccullough [facility]    "

## 2020-12-17 DIAGNOSIS — G47.00 INSOMNIA, UNSPECIFIED TYPE: ICD-10-CM

## 2020-12-17 NOTE — TELEPHONE ENCOUNTER
"Requested Prescriptions   Pending Prescriptions Disp Refills     hydrOXYzine (VISTARIL) 25 MG capsule [Pharmacy Med Name: hydrOXYzine 25mg PAMOATE CAP] 30 capsule 4     Sig: TAKE ONE CAPSULE BY MOUTH AT BEDTIME AS NEEDED FOR INSOMNIA       Antihistamines Protocol Passed - 12/17/2020 10:13 AM        Passed - Recent (12 mo) or future (30 days) visit within the authorizing provider's specialty     Patient has had an office visit with the authorizing provider or a provider within the authorizing providers department within the previous 12 mos or has a future within next 30 days. See \"Patient Info\" tab in inbasket, or \"Choose Columns\" in Meds & Orders section of the refill encounter.              Passed - Patient is age 3 or older     Apply age and/or weight-based dosing for peds patients age 3 and older.    Forward request to provider for patients under the age of 3.          Passed - Medication is active on med list             "

## 2020-12-21 RX ORDER — HYDROXYZINE PAMOATE 25 MG/1
CAPSULE ORAL
Qty: 30 CAPSULE | Refills: 4 | Status: SHIPPED | OUTPATIENT
Start: 2020-12-21 | End: 2021-06-22

## 2020-12-21 NOTE — TELEPHONE ENCOUNTER
Appears that prescription has passed protocol. Patient calling as she has not heard back from provider.     Prescription approved per [facility] [facility].      Eryn Sanchez RN

## 2021-01-11 ENCOUNTER — VIRTUAL VISIT (OUTPATIENT)
Dept: FAMILY MEDICINE | Facility: CLINIC | Age: 56
End: 2021-01-11
Payer: COMMERCIAL

## 2021-01-11 DIAGNOSIS — R21 RASH AND NONSPECIFIC SKIN ERUPTION: Primary | ICD-10-CM

## 2021-01-11 PROCEDURE — 99213 OFFICE O/P EST LOW 20 MIN: CPT | Mod: TEL | Performed by: NURSE PRACTITIONER

## 2021-01-11 RX ORDER — KETOCONAZOLE 20 MG/G
CREAM TOPICAL
Qty: 30 G | Refills: 1 | Status: SHIPPED | OUTPATIENT
Start: 2021-01-11 | End: 2023-06-22

## 2021-01-11 ASSESSMENT — ANXIETY QUESTIONNAIRES
5. BEING SO RESTLESS THAT IT IS HARD TO SIT STILL: SEVERAL DAYS
7. FEELING AFRAID AS IF SOMETHING AWFUL MIGHT HAPPEN: NOT AT ALL
1. FEELING NERVOUS, ANXIOUS, OR ON EDGE: SEVERAL DAYS
3. WORRYING TOO MUCH ABOUT DIFFERENT THINGS: NOT AT ALL
GAD7 TOTAL SCORE: 3
6. BECOMING EASILY ANNOYED OR IRRITABLE: NOT AT ALL
2. NOT BEING ABLE TO STOP OR CONTROL WORRYING: NOT AT ALL
IF YOU CHECKED OFF ANY PROBLEMS ON THIS QUESTIONNAIRE, HOW DIFFICULT HAVE THESE PROBLEMS MADE IT FOR YOU TO DO YOUR WORK, TAKE CARE OF THINGS AT HOME, OR GET ALONG WITH OTHER PEOPLE: NOT DIFFICULT AT ALL

## 2021-01-11 ASSESSMENT — PATIENT HEALTH QUESTIONNAIRE - PHQ9
5. POOR APPETITE OR OVEREATING: SEVERAL DAYS
SUM OF ALL RESPONSES TO PHQ QUESTIONS 1-9: 2

## 2021-01-11 NOTE — PATIENT INSTRUCTIONS
Refill of cream sent.  Follow up if worsening.      Our Clinic hours are:  Mondays    7:20 am - 7 pm  Tues -  Fri  7:20 am - 5 pm    Clinic Phone: [phone]    The clinic lab opens at 7:30 am Mon - Fri and appointments are required.    [facility]. [phone]  Monday  8 am - 7pm  Tues - Fri 8 am - 5:30 pm

## 2021-01-11 NOTE — PROGRESS NOTES
"Deana is a 55 year old who is being evaluated via a billable telephone visit.      What phone number would you like to be contacted at? [phone]  How would you like to obtain your AVS? MyChart  Assessment & Plan     Rash and nonspecific skin eruption    - ketoconazole (NIZORAL) 2 % external cream; As needed    }     BMI:   Estimated body mass index is 26.09 kg/m  as calculated from the following:    Height as of 3/23/20: 1.657 m (5' 5.25\").    Weight as of 11/6/20: 71.7 kg (158 lb).         See Patient Instructions  Patient Instructions   Refill of cream sent.  Follow up if worsening.      Our Clinic hours are:  Mondays    7:20 am - 7 pm  Tues -  Fri  7:20 am - 5 pm    Clinic Phone: [phone]    The clinic lab opens at 7:30 am Mon - Fri and appointments are required.    [facility]. [phone]  Monday  8 am - 7pm  Tues - Fri 8 am - 5:30 pm             Return in about 1 week (around 1/18/2021).    RADHA Toribio CNP  [facility]    Subjective     Deana is a 55 year old who presents to clinic today for the following health issues  accompanied by her self:    HPI       Rash  Onset/Duration: 2 mos   Description Patient has a rash on nose and both eyes it is blotchy red and dry she was using the Nizoral cream for her nose and it did help so she is calling for a refill   Location: eye and nose   Character: blotchy, red  Itching: no  Intensity:  moderate  Progression of Symptoms:  worsening  Accompanying signs and symptoms:   Fever: no  Body aches or joint pain: no  Sore throat symptoms: no  Recent cold symptoms: no  History:           Previous episodes of similar rash: around nose   New exposures:  None  Recent travel: no  Exposure to similar rash: no  Precipitating or alleviating factors: none   Therapies tried and outcome: Patient has used the nizoral cream in the past and it did help    Medication Followup of Nizoral cream    Taking Medication as " prescribed: NO    Side Effects:  None    Medication Helping Symptoms:  yes       Review of Systems   See above      Objective           Vitals:  No vitals were obtained today due to virtual visit.    Physical Exam   healthy, alert and no distress  PSYCH: Alert and oriented times 3; coherent speech, normal   rate and volume, able to articulate logical thoughts, able   to abstract reason, no tangential thoughts, no hallucinations   or delusions  Her affect is normal  RESP: No cough, no audible wheezing, able to talk in full sentences  Remainder of exam unable to be completed due to telephone visits            Phone call duration: 5 minutes

## 2021-01-12 ASSESSMENT — ANXIETY QUESTIONNAIRES: GAD7 TOTAL SCORE: 3

## 2021-01-15 ENCOUNTER — HEALTH MAINTENANCE LETTER (OUTPATIENT)
Age: 56
End: 2021-01-15

## 2021-01-22 ENCOUNTER — OFFICE VISIT (OUTPATIENT)
Dept: DERMATOLOGY | Facility: CLINIC | Age: 56
End: 2021-01-22
Payer: COMMERCIAL

## 2021-01-22 VITALS — SYSTOLIC BLOOD PRESSURE: 96 MMHG | HEART RATE: 78 BPM | DIASTOLIC BLOOD PRESSURE: 71 MMHG | OXYGEN SATURATION: 97 %

## 2021-01-22 DIAGNOSIS — L71.0 PERIORIFICIAL DERMATITIS: Primary | ICD-10-CM

## 2021-01-22 PROCEDURE — 99203 OFFICE O/P NEW LOW 30 MIN: CPT | Performed by: PHYSICIAN ASSISTANT

## 2021-01-22 RX ORDER — DOXYCYCLINE 100 MG/1
CAPSULE ORAL
Qty: 90 CAPSULE | Refills: 0 | Status: SHIPPED | OUTPATIENT
Start: 2021-01-22 | End: 2021-05-21

## 2021-01-22 NOTE — PROGRESS NOTES
"Initial LMP 02/20/2015 (Exact Date)  Estimated body mass index is 26.09 kg/m  as calculated from the following:    Height as of 3/23/20: 1.657 m (5' 5.25\").    Weight as of 11/6/20: 71.7 kg (158 lb). .    Larissa Mercedes MA on 1/22/2021 at 11:50 AM    "

## 2021-01-22 NOTE — LETTER
"    1/22/2021         RE: Tequila Neumann  [address]  [place] [address]        Dear Colleague,    Thank you for referring your patient, Tequila Neumann, to the [facility]. Please see a copy of my visit note below.    Initial LMP 02/20/2015 (Exact Date)  Estimated body mass index is 26.09 kg/m  as calculated from the following:    Height as of 3/23/20: 1.657 m (5' 5.25\").    Weight as of 11/6/20: 71.7 kg (158 lb). .    Larissa Mercedes MA on 1/22/2021 at 11:50 AM      Tequila Neumann is an extremely pleasant 55 year old year old female patient here today for rash under eyes. Present for 2-3 months. She has tried nizoral cream helps a little but still flaring. Denies any pain or itching.  Patient has no other skin complaints today.  Remainder of the HPI, Meds, PMH, Allergies, FH, and SH was reviewed in chart.    Past Medical History:   Diagnosis Date     Abnormal Pap smear of cervix 1985    s/p LEEP     Abnormal Pap smear of cervix 03/16/2018    See problem list     Avoids wheat/gluten and refined sugar and feeling better 1/10/2019     Calculus of GB w/ other cystitis 1/3/2012     Cervical high risk HPV (human papillomavirus) test positive 03/16/2018    See problem list     EGD suggestive of achalasia 1/10/2019     Familial dystonia 1/10/2019     Fibromyalgia        Past Surgical History:   Procedure Laterality Date     ARTHROSCOPY KNEE RT/LT Left 12/08/2005    Left medial meniscal tear bilat     COLONOSCOPY  7/11/2014    Procedure: COLONOSCOPY;  Surgeon: Viridiana Bonilla MD;  Location: [facility]     D & C  x 2     ESOPHAGOSCOPY, GASTROSCOPY, DUODENOSCOPY (EGD), COMBINED  7/16/2012    Procedure: COMBINED ESOPHAGOSCOPY, GASTROSCOPY, DUODENOSCOPY (EGD);;  Surgeon: Lito Kwon MD;  Location:  GI     HC ESOPH/GAS REFLUX TEST W NASAL IMPED ELECTRODE  8/23/2012    Procedure: ESOPHAGEAL IMPEDENCE FUNCTION TEST 1 HOUR OR LESS;  Surgeon: Tequila Boone MD;  Location: " [facility]     LAPAROSCOPIC CHOLECYSTECTOMY  1/25/2012    Procedure:LAPAROSCOPIC CHOLECYSTECTOMY; Laparoscopic vs Open Cholecystectomy; Surgeon:RANDELL MCGARRY; Location:[facility] OR     LASIK Bilateral 2000     LEEP TX, CERVICAL  1985     TUBAL LIGATION  1998        Family History   Problem Relation Age of Onset     C.A.D. Father      Diabetes Father      Hypertension Father      Cerebrovascular Disease Father      Lipids Father      Breast Cancer Maternal Grandmother         age 80+     Alzheimer Disease Maternal Grandmother         age 70+     Cancer Mother         sarcoma, small intestinal     Neurologic Disorder Mother         tremor     Tremor Mother      Dystonia Mother      Colon Polyps Sister      Other - See Comments Sister         no clear stroke     Dystonia Daughter      Cancer - colorectal No family hx of      Prostate Cancer No family hx of        Social History     Socioeconomic History     Marital status: Single     Spouse name: Not on file     Number of children: 2     Years of education: Not on file     Highest education level: Not on file   Occupational History     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     Employer: [employer]   Social Needs     Financial resource strain: Not on file     Food insecurity     Worry: Not on file     Inability: Not on file     Transportation needs     Medical: Not on file     Non-medical: Not on file   Tobacco Use     Smoking status: Former Smoker     Packs/day: 0.50     Years: 30.00     Pack years: 15.00     Types: Cigarettes     Smokeless tobacco: Never Used   Substance and Sexual Activity     Alcohol use: Yes     Comment: 3 drinks/week - helps      Drug use: No     Sexual activity: Yes     Partners: Male     Birth control/protection: Surgical, Female Surgical     Comment: tubal   Lifestyle     Physical activity     Days per week: Not on file     Minutes per session: Not on file     Stress: Not on file   Relationships     Social connections     Talks on phone: Not on  file     Gets together: Not on file     Attends Orthodoxy service: Not on file     Active member of club or organization: Not on file     Attends meetings of clubs or organizations: Not on file     Relationship status: Not on file     Intimate partner violence     Fear of current or ex partner: Not on file     Emotionally abused: Not on file     Physically abused: Not on file     Forced sexual activity: Not on file   Other Topics Concern     Parent/sibling w/ CABG, MI or angioplasty before 65F 55M? No   Social History Narrative    single[place] [facility] [place] [place] Serene Cruz contact        Mother was 100% [place]i    Thien is Croatian         [place]    [place], [place] area        Working         Tremor in her mother - had head tremor and hand tremor    Maternal grandmother with alzheimers disease    Parents disease  young    1 sister healthy    2: Biological children: 2 daughthers: 28 yrs and 29 yrs old: [place] [facility]    Lives with significant other in [place]    Sometimes drinks alcohol which helps her tremor     for [employer]; works in maple wood               Outpatient Encounter Medications as of 2021   Medication Sig Dispense Refill     adalimumab (HUMIRA *CF*) 40 MG/0.4ML pen kit Inject 40 mg Subcutaneous       celecoxib (CELEBREX) 100 MG capsule 100 mg daily as needed        cholecalciferol (VITAMIN D) 1000 UNIT tablet Take 1 tablet by mouth daily.       [facility] NICOTINE 14 MG/24HR 24 hr patch As needed  3     diclofenac (VOLTAREN) 75 MG EC tablet Take 1 tablet (75 mg) by mouth 2 times daily as needed for moderate pain 60 tablet 1     doxycycline monohydrate (MONODOX) 100 MG capsule Take 90 capsule 0     folic acid (FOLVITE) 1 MG tablet        hydrOXYzine (VISTARIL) 25 MG capsule TAKE ONE CAPSULE BY MOUTH AT BEDTIME AS NEEDED FOR INSOMNIA 30 capsule 4     ketoconazole (NIZORAL) 2 % external cream As needed 30 g 1     levothyroxine (SYNTHROID/LEVOTHROID) 125 MCG  tablet Take 1 tablet (125 mcg) by mouth daily 90 tablet 3     methocarbamol (ROBAXIN) 500 MG tablet 1-2 x 500mg tab by mouth nightly as needed 180 tablet 3     propranolol (INDERAL) 60 MG tablet Take 1 tablet (60 mg) by mouth 2 times daily 60 tablet 5     [DISCONTINUED] gabapentin (NEURONTIN) 100 MG capsule TAKE 4 CAPSULES BY MOUTH EVERY DAY AT BEDTIME (Patient not taking: Reported on 1/22/2021) 360 capsule 0     [DISCONTINUED] HYDROcodone-acetaminophen (NORCO) 5-325 MG tablet daily as needed        [DISCONTINUED] predniSONE (DELTASONE) 5 MG tablet 15 mg daily for 7 days, than 10 mg daily for 7 days, than 7.5 mg daily for 7 days and than start 5 mg daily and continue (Patient not taking: Reported on 11/6/2020) 60 tablet 1     [DISCONTINUED] traMADol (ULTRAM) 50 MG tablet TAKE ONE TABLET BY MOUTH ONCE DAILY AS NEEDED FOR MODERATE TO SEVERE PAIN (Patient not taking: Reported on 11/6/2020) 30 tablet 0     No facility-administered encounter medications on file as of 1/22/2021.              Review Of Systems  Skin: As above  Eyes: negative  Ears/Nose/Throat: negative  Respiratory: No shortness of breath, dyspnea on exertion, cough    O:   NAD, WDWN, Alert & Oriented, Mood & Affect wnl, Vitals stable   Here today alone   BP 96/71 (BP Location: Left arm)   Pulse 78   LMP 02/20/2015 (Exact Date)   SpO2 97%    General appearance normal   Vitals stable   Alert, oriented and in no acute distress     Pink small inflammatory  papules around lateral eyelids       Eyes: Conjunctivae/lids:Normal     ENT: Lips: normal    MSK:Normal    Pulm: Breathing Normal    Neuro/Psych: Orientation:Alert and Orientedx3 ; Mood/Affect:normal   A/P:  1.  Periocular dermatitis   Discussed pathophysiology, informational handout was given.   Discussed topical vs oral antibiotic.  Patient prefers oral antibiotic, start doxycycline twice daily for one month then decrease to once daily.   Return if not resolving.       Again, thank you for allowing me  to participate in the care of your patient.        Sincerely,        Ewelina Hernandez PA-C

## 2021-01-22 NOTE — PROGRESS NOTES
Tequila Neumann is an extremely pleasant 55 year old year old female patient here today for rash under eyes. Present for 2-3 months. She has tried nizoral cream helps a little but still flaring. Denies any pain or itching.  Patient has no other skin complaints today.  Remainder of the HPI, Meds, PMH, Allergies, FH, and SH was reviewed in chart.    Past Medical History:   Diagnosis Date     Abnormal Pap smear of cervix 1985    s/p LEEP     Abnormal Pap smear of cervix 03/16/2018    See problem list     Avoids wheat/gluten and refined sugar and feeling better 1/10/2019     Calculus of GB w/ other cystitis 1/3/2012     Cervical high risk HPV (human papillomavirus) test positive 03/16/2018    See problem list     EGD suggestive of achalasia 1/10/2019     Familial dystonia 1/10/2019     Fibromyalgia        Past Surgical History:   Procedure Laterality Date     ARTHROSCOPY KNEE RT/LT Left 12/08/2005    Left medial meniscal tear bilat     COLONOSCOPY  7/11/2014    Procedure: COLONOSCOPY;  Surgeon: Randell Bonilla MD;  Location: [facility]     D & C  x 2     ESOPHAGOSCOPY, GASTROSCOPY, DUODENOSCOPY (EGD), COMBINED  7/16/2012    Procedure: COMBINED ESOPHAGOSCOPY, GASTROSCOPY, DUODENOSCOPY (EGD);;  Surgeon: Lito Kwon MD;  Location:  GI     HC ESOPH/GAS REFLUX TEST W NASAL IMPED ELECTRODE  8/23/2012    Procedure: ESOPHAGEAL IMPEDENCE FUNCTION TEST 1 HOUR OR LESS;  Surgeon: Tequila Boone MD;  Location: [facility]     LAPAROSCOPIC CHOLECYSTECTOMY  1/25/2012    Procedure:LAPAROSCOPIC CHOLECYSTECTOMY; Laparoscopic vs Open Cholecystectomy; Surgeon:RANDELL BONILLA; Location:[facility]     LASIK Bilateral 2000     LEEP TX, CERVICAL  1985     TUBAL LIGATION  1998        Family History   Problem Relation Age of Onset     C.A.D. Father      Diabetes Father      Hypertension Father      Cerebrovascular Disease Father      Lipids Father      Breast Cancer Maternal Grandmother         age 80+     Alzheimer  Disease Maternal Grandmother         age 70+     Cancer Mother         sarcoma, small intestinal     Neurologic Disorder Mother         tremor     Tremor Mother      Dystonia Mother      Colon Polyps Sister      Other - See Comments Sister         no clear stroke     Dystonia Daughter      Cancer - colorectal No family hx of      Prostate Cancer No family hx of        Social History     Socioeconomic History     Marital status: Single     Spouse name: Not on file     Number of children: 2     Years of education: Not on file     Highest education level: Not on file   Occupational History     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     Employer: [id number]   Social Needs     Financial resource strain: Not on file     Food insecurity     Worry: Not on file     Inability: Not on file     Transportation needs     Medical: Not on file     Non-medical: Not on file   Tobacco Use     Smoking status: Former Smoker     Packs/day: 0.50     Years: 30.00     Pack years: 15.00     Types: Cigarettes     Smokeless tobacco: Never Used   Substance and Sexual Activity     Alcohol use: Yes     Comment: 3 drinks/week - helps      Drug use: No     Sexual activity: Yes     Partners: Male     Birth control/protection: Surgical, Female Surgical     Comment: tubal   Lifestyle     Physical activity     Days per week: Not on file     Minutes per session: Not on file     Stress: Not on file   Relationships     Social connections     Talks on phone: Not on file     Gets together: Not on file     Attends Amish service: Not on file     Active member of club or organization: Not on file     Attends meetings of clubs or organizations: Not on file     Relationship status: Not on file     Intimate partner violence     Fear of current or ex partner: Not on file     Emotionally abused: Not on file     Physically abused: Not on file     Forced sexual activity: Not on file   Other Topics Concern     Parent/sibling w/ CABG, MI or angioplasty before 65F 55M? No    Social History Narrative    single. livews in [place]. Serene Cruz contact        Mother was 100% [place]    Thien is Khmer         [place]    [place], [place] area        Working         Tremor in her mother - had head tremor and hand tremor    Maternal grandmother with alzheimers disease    Parents disease  young    1 sister healthy    2: Biological children: 2 daughthers: 28 yrs and 29 yrs old: [place] and [facility]    Lives with significant other in [facility]    Sometimes drinks alcohol which helps her tremor     for [employer]; works in maple wood               Outpatient Encounter Medications as of 2021   Medication Sig Dispense Refill     adalimumab (HUMIRA *CF*) 40 MG/0.4ML pen kit Inject 40 mg Subcutaneous       celecoxib (CELEBREX) 100 MG capsule 100 mg daily as needed        cholecalciferol (VITAMIN D) 1000 UNIT tablet Take 1 tablet by mouth daily.       [facility] NICOTINE 14 MG/24HR 24 hr patch As needed  3     diclofenac (VOLTAREN) 75 MG EC tablet Take 1 tablet (75 mg) by mouth 2 times daily as needed for moderate pain 60 tablet 1     doxycycline monohydrate (MONODOX) 100 MG capsule Take 90 capsule 0     folic acid (FOLVITE) 1 MG tablet        hydrOXYzine (VISTARIL) 25 MG capsule TAKE ONE CAPSULE BY MOUTH AT BEDTIME AS NEEDED FOR INSOMNIA 30 capsule 4     ketoconazole (NIZORAL) 2 % external cream As needed 30 g 1     levothyroxine (SYNTHROID/LEVOTHROID) 125 MCG tablet Take 1 tablet (125 mcg) by mouth daily 90 tablet 3     methocarbamol (ROBAXIN) 500 MG tablet 1-2 x 500mg tab by mouth nightly as needed 180 tablet 3     propranolol (INDERAL) 60 MG tablet Take 1 tablet (60 mg) by mouth 2 times daily 60 tablet 5     [DISCONTINUED] gabapentin (NEURONTIN) 100 MG capsule TAKE 4 CAPSULES BY MOUTH EVERY DAY AT BEDTIME (Patient not taking: Reported on 2021) 360 capsule 0     [DISCONTINUED] HYDROcodone-acetaminophen (NORCO) 5-325 MG tablet daily as needed         [DISCONTINUED] predniSONE (DELTASONE) 5 MG tablet 15 mg daily for 7 days, than 10 mg daily for 7 days, than 7.5 mg daily for 7 days and than start 5 mg daily and continue (Patient not taking: Reported on 11/6/2020) 60 tablet 1     [DISCONTINUED] traMADol (ULTRAM) 50 MG tablet TAKE ONE TABLET BY MOUTH ONCE DAILY AS NEEDED FOR MODERATE TO SEVERE PAIN (Patient not taking: Reported on 11/6/2020) 30 tablet 0     No facility-administered encounter medications on file as of 1/22/2021.              Review Of Systems  Skin: As above  Eyes: negative  Ears/Nose/Throat: negative  Respiratory: No shortness of breath, dyspnea on exertion, cough    O:   NAD, WDWN, Alert & Oriented, Mood & Affect wnl, Vitals stable   Here today alone   BP 96/71 (BP Location: Left arm)   Pulse 78   LMP 02/20/2015 (Exact Date)   SpO2 97%    General appearance normal   Vitals stable   Alert, oriented and in no acute distress     Pink small inflammatory  papules around lateral eyelids       Eyes: Conjunctivae/lids:Normal     ENT: Lips: normal    MSK:Normal    Pulm: Breathing Normal    Neuro/Psych: Orientation:Alert and Orientedx3 ; Mood/Affect:normal   A/P:  1.  Periocular dermatitis   Discussed pathophysiology, informational handout was given.   Discussed topical vs oral antibiotic.  Patient prefers oral antibiotic, start doxycycline twice daily for one month then decrease to once daily.   Return if not resolving.

## 2021-02-23 DIAGNOSIS — L71.0 PERIORIFICIAL DERMATITIS: ICD-10-CM

## 2021-02-23 RX ORDER — DOXYCYCLINE 100 MG/1
CAPSULE ORAL
Qty: 90 CAPSULE | Refills: 0 | Status: CANCELLED | OUTPATIENT
Start: 2021-02-23

## 2021-02-23 NOTE — TELEPHONE ENCOUNTER
I called pharmacy to see if they dispensed #90 Doxycyline as ordered last month with directions of take twice daily for 30 days then decrease to once daily for 30 days.    Pharmacy did have that on file and they will now dispense remaining #30 as they did only dispense #60 last month.    Viviana Garcia RN

## 2021-02-23 NOTE — TELEPHONE ENCOUNTER
Requested Prescriptions   Pending Prescriptions Disp Refills     doxycycline monohydrate (MONODOX) 100 MG capsule 90 capsule 0     Sig: Take       There is no refill protocol information for this order        Last office visit: 1/22/2021 with prescribing provider:  MARGOTH Miranda   Future Office Visit:          Denise Behrendt  Specialty CSS

## 2021-05-21 ENCOUNTER — OFFICE VISIT (OUTPATIENT)
Dept: FAMILY MEDICINE | Facility: CLINIC | Age: 56
End: 2021-05-21
Payer: COMMERCIAL

## 2021-05-21 ENCOUNTER — MYC MEDICAL ADVICE (OUTPATIENT)
Dept: FAMILY MEDICINE | Facility: CLINIC | Age: 56
End: 2021-05-21

## 2021-05-21 VITALS
BODY MASS INDEX: 25.91 KG/M2 | WEIGHT: 155.5 LBS | TEMPERATURE: 97.9 F | RESPIRATION RATE: 16 BRPM | HEART RATE: 62 BPM | HEIGHT: 65 IN | SYSTOLIC BLOOD PRESSURE: 100 MMHG | OXYGEN SATURATION: 96 % | DIASTOLIC BLOOD PRESSURE: 60 MMHG

## 2021-05-21 DIAGNOSIS — Z12.11 SPECIAL SCREENING FOR MALIGNANT NEOPLASMS, COLON: Primary | ICD-10-CM

## 2021-05-21 DIAGNOSIS — R10.13 EPIGASTRIC PAIN: ICD-10-CM

## 2021-05-21 DIAGNOSIS — Z87.891 HISTORY OF SMOKING 10-25 PACK YEARS: ICD-10-CM

## 2021-05-21 DIAGNOSIS — E78.00 PURE HYPERCHOLESTEROLEMIA: ICD-10-CM

## 2021-05-21 DIAGNOSIS — R07.89 ATYPICAL CHEST PAIN: ICD-10-CM

## 2021-05-21 LAB
ALBUMIN SERPL-MCNC: 3.8 G/DL (ref 3.4–5)
ALP SERPL-CCNC: 119 U/L (ref 40–150)
ALT SERPL W P-5'-P-CCNC: 27 U/L (ref 0–50)
ANION GAP SERPL CALCULATED.3IONS-SCNC: 4 MMOL/L (ref 3–14)
AST SERPL W P-5'-P-CCNC: 15 U/L (ref 0–45)
BASOPHILS # BLD AUTO: 0 10E9/L (ref 0–0.2)
BASOPHILS NFR BLD AUTO: 0.5 %
BILIRUB SERPL-MCNC: 1 MG/DL (ref 0.2–1.3)
BUN SERPL-MCNC: 13 MG/DL (ref 7–30)
CALCIUM SERPL-MCNC: 8.6 MG/DL (ref 8.5–10.1)
CHLORIDE SERPL-SCNC: 103 MMOL/L (ref 94–109)
CHOLEST SERPL-MCNC: 282 MG/DL
CO2 SERPL-SCNC: 29 MMOL/L (ref 20–32)
CREAT SERPL-MCNC: 0.72 MG/DL (ref 0.52–1.04)
CRP SERPL-MCNC: <2.9 MG/L (ref 0–8)
DIFFERENTIAL METHOD BLD: NORMAL
EOSINOPHIL # BLD AUTO: 0.1 10E9/L (ref 0–0.7)
EOSINOPHIL NFR BLD AUTO: 2 %
ERYTHROCYTE [DISTWIDTH] IN BLOOD BY AUTOMATED COUNT: 13.4 % (ref 10–15)
ERYTHROCYTE [SEDIMENTATION RATE] IN BLOOD BY WESTERGREN METHOD: 6 MM/H (ref 0–30)
GFR SERPL CREATININE-BSD FRML MDRD: >90 ML/MIN/{1.73_M2}
GLUCOSE SERPL-MCNC: 89 MG/DL (ref 70–99)
HCT VFR BLD AUTO: 43.3 % (ref 35–47)
HDLC SERPL-MCNC: 61 MG/DL
HGB BLD-MCNC: 14.9 G/DL (ref 11.7–15.7)
IMM GRANULOCYTES # BLD: 0 10E9/L (ref 0–0.4)
IMM GRANULOCYTES NFR BLD: 0.2 %
LDLC SERPL CALC-MCNC: 180 MG/DL
LIPASE SERPL-CCNC: 79 U/L (ref 73–393)
LYMPHOCYTES # BLD AUTO: 3.7 10E9/L (ref 0.8–5.3)
LYMPHOCYTES NFR BLD AUTO: 55.9 %
MCH RBC QN AUTO: 32.5 PG (ref 26.5–33)
MCHC RBC AUTO-ENTMCNC: 34.4 G/DL (ref 31.5–36.5)
MCV RBC AUTO: 95 FL (ref 78–100)
MONOCYTES # BLD AUTO: 0.6 10E9/L (ref 0–1.3)
MONOCYTES NFR BLD AUTO: 9 %
NEUTROPHILS # BLD AUTO: 2.1 10E9/L (ref 1.6–8.3)
NEUTROPHILS NFR BLD AUTO: 32.4 %
NONHDLC SERPL-MCNC: 221 MG/DL
NRBC # BLD AUTO: 0 10*3/UL
NRBC BLD AUTO-RTO: 0 /100
PLATELET # BLD AUTO: 193 10E9/L (ref 150–450)
POTASSIUM SERPL-SCNC: 4 MMOL/L (ref 3.4–5.3)
PROT SERPL-MCNC: 7.1 G/DL (ref 6.8–8.8)
RBC # BLD AUTO: 4.58 10E12/L (ref 3.8–5.2)
SODIUM SERPL-SCNC: 136 MMOL/L (ref 133–144)
TRIGL SERPL-MCNC: 206 MG/DL
WBC # BLD AUTO: 6.6 10E9/L (ref 4–11)

## 2021-05-21 PROCEDURE — 83690 ASSAY OF LIPASE: CPT | Performed by: NURSE PRACTITIONER

## 2021-05-21 PROCEDURE — 86140 C-REACTIVE PROTEIN: CPT | Performed by: NURSE PRACTITIONER

## 2021-05-21 PROCEDURE — 80061 LIPID PANEL: CPT | Performed by: NURSE PRACTITIONER

## 2021-05-21 PROCEDURE — 36415 COLL VENOUS BLD VENIPUNCTURE: CPT | Performed by: NURSE PRACTITIONER

## 2021-05-21 PROCEDURE — 99214 OFFICE O/P EST MOD 30 MIN: CPT | Performed by: NURSE PRACTITIONER

## 2021-05-21 PROCEDURE — 85652 RBC SED RATE AUTOMATED: CPT | Performed by: NURSE PRACTITIONER

## 2021-05-21 PROCEDURE — 93000 ELECTROCARDIOGRAM COMPLETE: CPT | Performed by: NURSE PRACTITIONER

## 2021-05-21 PROCEDURE — 80053 COMPREHEN METABOLIC PANEL: CPT | Performed by: NURSE PRACTITIONER

## 2021-05-21 PROCEDURE — 85025 COMPLETE CBC W/AUTO DIFF WBC: CPT | Performed by: NURSE PRACTITIONER

## 2021-05-21 RX ORDER — SIMVASTATIN 20 MG
20 TABLET ORAL AT BEDTIME
Qty: 90 TABLET | Refills: 3 | Status: SHIPPED | OUTPATIENT
Start: 2021-05-21 | End: 2023-06-22

## 2021-05-21 ASSESSMENT — MIFFLIN-ST. JEOR: SCORE: 1295.18

## 2021-05-21 NOTE — PROGRESS NOTES
Assessment & Plan     Special screening for malignant neoplasms, colon    - Fecal colorectal cancer screen (FIT); Future    Atypical chest pain  -EKG normal, pain is not exertional without other concerning symptoms such as shortness of breath, palpitations and dizziness, advised Deana that pain is musculoskeletal, or due to costochondritis, patient will try Celebrex again   - CRP, inflammation  - ESR: Erythrocyte sedimentation rate  - EKG 12-lead complete w/read - Clinics    Epigastric pain  -labs normal  -recommended to start over the counter Prilosec 20 mg daily for 2 weeks   - Comprehensive metabolic panel (BMP + Alb, Alk Phos, ALT, AST, Total. Bili, TP)  - CBC with platelets and differential  - Lipase    History of smoking 10-25 pack years  -recommended screening chest CT   - CT Chest Lung Cancer Scrn Low Dose wo; Future    Pure hypercholesterolemia  -patient have concerns about starting statin medication, recommended Deana to try lifestyle changes first (diet and exercise) and repeat lipid panel in 3 months   - Lipid panel reflex to direct LDL Fasting  - simvastatin (ZOCOR) 20 MG tablet; Take 1 tablet (20 mg) by mouth At Bedtime  - Lipid panel reflex to direct LDL Fasting; Future      RADHA Ernandez CNP  [facility]    Subjective   Deana is a 56 year old who presents for the following health issues   HPI     Chest Pain  Onset/Duration: 2 months   Description:   Location: left side   Character: achey  Radiation: between her shoulder blades and her ribs   Duration: constant- gets worse after she sleeps- sleeps on her side   Intensity: moderate   Progression of Symptoms: worsening  Accompanying Signs & Symptoms:  Shortness of breath: YES  Sweating: YES  Nausea/vomiting: no  Lightheadedness: no  Palpitations: YES  Fever/Chills: no   Cough: no           Heartburn: no  History:   Family history of heart disease: YES  Tobacco use: YES  Previous similar symptoms: yes- off and on  "  Precipitating factors:   Worse with exertion: no  Worse with deep breaths: YES           Related to eating: no           Better with burping: no  Alleviating factors: none   Therapies tried and outcome: none         Review of Systems   Constitutional, HEENT, cardiovascular, pulmonary, gi and gu systems are negative, except as otherwise noted.      Objective    /60 (BP Location: Right arm, Patient Position: Sitting, Cuff Size: Adult Large)   Pulse 62   Temp 97.9  F (36.6  C) (Tympanic)   Resp 16   Ht 1.657 m (5' 5.25\")   Wt 70.5 kg (155 lb 8 oz)   LMP 02/20/2015 (Exact Date)   SpO2 96%   BMI 25.68 kg/m    Body mass index is 25.68 kg/m .  Physical Exam   GENERAL: healthy, alert and no distress  EYES: Eyes grossly normal to inspection, PERRL and conjunctivae and sclerae normal  RESP: lungs clear to auscultation - no rales, rhonchi or wheezes  CV: regular rate and rhythm, normal S1 S2, no S3 or S4, no murmur, click or rub, no peripheral edema and peripheral pulses strong  GI: mild epigastric tenderness   MS: no gross musculoskeletal defects noted, no edema, chest wall bilaterally slightly tender to palpation   SKIN: no suspicious lesions or rashes  NEURO: Normal strength and tone, mentation intact and speech normal  PSYCH: mentation appears normal, affect normal/bright    Results for orders placed or performed in visit on 05/21/21   CRP, inflammation     Status: None   Result Value Ref Range    CRP Inflammation <2.9 0.0 - 8.0 mg/L   ESR: Erythrocyte sedimentation rate     Status: None   Result Value Ref Range    Sed Rate 6 0 - 30 mm/h   Comprehensive metabolic panel (BMP + Alb, Alk Phos, ALT, AST, Total. Bili, TP)     Status: None   Result Value Ref Range    Sodium 136 133 - 144 mmol/L    Potassium 4.0 3.4 - 5.3 mmol/L    Chloride 103 94 - 109 mmol/L    Carbon Dioxide 29 20 - 32 mmol/L    Anion Gap 4 3 - 14 mmol/L    Glucose 89 70 - 99 mg/dL    Urea Nitrogen 13 7 - 30 mg/dL    Creatinine 0.72 0.52 - 1.04 " mg/dL    GFR Estimate >90 >60 mL/min/[1.73_m2]    GFR Estimate If Black >90 >60 mL/min/[1.73_m2]    Calcium 8.6 8.5 - 10.1 mg/dL    Bilirubin Total 1.0 0.2 - 1.3 mg/dL    Albumin 3.8 3.4 - 5.0 g/dL    Protein Total 7.1 6.8 - 8.8 g/dL    Alkaline Phosphatase 119 40 - 150 U/L    ALT 27 0 - 50 U/L    AST 15 0 - 45 U/L   CBC with platelets and differential     Status: None   Result Value Ref Range    WBC 6.6 4.0 - 11.0 10e9/L    RBC Count 4.58 3.8 - 5.2 10e12/L    Hemoglobin 14.9 11.7 - 15.7 g/dL    Hematocrit 43.3 35.0 - 47.0 %    MCV 95 78 - 100 fl    MCH 32.5 26.5 - 33.0 pg    MCHC 34.4 31.5 - 36.5 g/dL    RDW 13.4 10.0 - 15.0 %    Platelet Count 193 150 - 450 10e9/L    Diff Method Automated Method     % Neutrophils 32.4 %    % Lymphocytes 55.9 %    % Monocytes 9.0 %    % Eosinophils 2.0 %    % Basophils 0.5 %    % Immature Granulocytes 0.2 %    Nucleated RBCs 0 0 /100    Absolute Neutrophil 2.1 1.6 - 8.3 10e9/L    Absolute Lymphocytes 3.7 0.8 - 5.3 10e9/L    Absolute Monocytes 0.6 0.0 - 1.3 10e9/L    Absolute Eosinophils 0.1 0.0 - 0.7 10e9/L    Absolute Basophils 0.0 0.0 - 0.2 10e9/L    Abs Immature Granulocytes 0.0 0 - 0.4 10e9/L    Absolute Nucleated RBC 0.0    Lipase     Status: None   Result Value Ref Range    Lipase 79 73 - 393 U/L   Lipid panel reflex to direct LDL Fasting     Status: Abnormal   Result Value Ref Range    Cholesterol 282 (H) <200 mg/dL    Triglycerides 206 (H) <150 mg/dL    HDL Cholesterol 61 >49 mg/dL    LDL Cholesterol Calculated 180 (H) <100 mg/dL    Non HDL Cholesterol 221 (H) <130 mg/dL

## 2021-05-21 NOTE — PATIENT INSTRUCTIONS
EKG showed mild bradycardia, HR 58-59, otherwise normal    Labs today    Schedule screening lung CT scan

## 2021-05-26 DIAGNOSIS — Z12.11 SPECIAL SCREENING FOR MALIGNANT NEOPLASMS, COLON: ICD-10-CM

## 2021-05-26 PROCEDURE — 82274 ASSAY TEST FOR BLOOD FECAL: CPT | Performed by: NURSE PRACTITIONER

## 2021-05-27 ENCOUNTER — HOSPITAL ENCOUNTER (OUTPATIENT)
Dept: CT IMAGING | Facility: CLINIC | Age: 56
Discharge: HOME | End: 2021-05-27
Attending: NURSE PRACTITIONER | Admitting: NURSE PRACTITIONER
Payer: COMMERCIAL

## 2021-05-27 ENCOUNTER — RECORDS - HEALTHEAST (OUTPATIENT)
Dept: ADMINISTRATIVE | Facility: CLINIC | Age: 56
End: 2021-05-27

## 2021-05-27 DIAGNOSIS — Z87.891 HISTORY OF SMOKING 10-25 PACK YEARS: ICD-10-CM

## 2021-05-27 PROCEDURE — 71271 CT THORAX LUNG CANCER SCR C-: CPT

## 2021-05-28 LAB — HEMOCCULT STL QL IA: NEGATIVE

## 2021-06-21 DIAGNOSIS — G47.00 INSOMNIA, UNSPECIFIED TYPE: ICD-10-CM

## 2021-06-22 RX ORDER — HYDROXYZINE PAMOATE 25 MG/1
CAPSULE ORAL
Qty: 30 CAPSULE | Refills: 4 | Status: SHIPPED | OUTPATIENT
Start: 2021-06-22 | End: 2021-11-26

## 2021-07-06 ENCOUNTER — HOSPITAL ENCOUNTER (OUTPATIENT)
Dept: MAMMOGRAPHY | Facility: CLINIC | Age: 56
Discharge: HOME | End: 2021-07-06
Attending: OBSTETRICS & GYNECOLOGY | Admitting: OBSTETRICS & GYNECOLOGY
Payer: COMMERCIAL

## 2021-07-06 DIAGNOSIS — Z12.31 VISIT FOR SCREENING MAMMOGRAM: ICD-10-CM

## 2021-07-06 PROCEDURE — 77067 SCR MAMMO BI INCL CAD: CPT

## 2021-08-05 ENCOUNTER — OFFICE VISIT (OUTPATIENT)
Dept: FAMILY MEDICINE | Facility: CLINIC | Age: 56
End: 2021-08-05
Payer: COMMERCIAL

## 2021-08-05 VITALS
HEART RATE: 86 BPM | BODY MASS INDEX: 26.36 KG/M2 | OXYGEN SATURATION: 96 % | TEMPERATURE: 97.8 F | WEIGHT: 158.2 LBS | HEIGHT: 65 IN | SYSTOLIC BLOOD PRESSURE: 124 MMHG | DIASTOLIC BLOOD PRESSURE: 82 MMHG

## 2021-08-05 DIAGNOSIS — J03.90 TONSILLITIS: Primary | ICD-10-CM

## 2021-08-05 PROCEDURE — 99213 OFFICE O/P EST LOW 20 MIN: CPT | Performed by: FAMILY MEDICINE

## 2021-08-05 RX ORDER — PREDNISONE 20 MG/1
20 TABLET ORAL DAILY
Qty: 5 TABLET | Refills: 0 | Status: SHIPPED | OUTPATIENT
Start: 2021-08-05 | End: 2021-08-10

## 2021-08-05 ASSESSMENT — ANXIETY QUESTIONNAIRES
2. NOT BEING ABLE TO STOP OR CONTROL WORRYING: SEVERAL DAYS
GAD7 TOTAL SCORE: 13
IF YOU CHECKED OFF ANY PROBLEMS ON THIS QUESTIONNAIRE, HOW DIFFICULT HAVE THESE PROBLEMS MADE IT FOR YOU TO DO YOUR WORK, TAKE CARE OF THINGS AT HOME, OR GET ALONG WITH OTHER PEOPLE: SOMEWHAT DIFFICULT
5. BEING SO RESTLESS THAT IT IS HARD TO SIT STILL: MORE THAN HALF THE DAYS
6. BECOMING EASILY ANNOYED OR IRRITABLE: MORE THAN HALF THE DAYS
7. FEELING AFRAID AS IF SOMETHING AWFUL MIGHT HAPPEN: SEVERAL DAYS
1. FEELING NERVOUS, ANXIOUS, OR ON EDGE: NEARLY EVERY DAY
3. WORRYING TOO MUCH ABOUT DIFFERENT THINGS: SEVERAL DAYS

## 2021-08-05 ASSESSMENT — MIFFLIN-ST. JEOR: SCORE: 1307.43

## 2021-08-05 ASSESSMENT — PATIENT HEALTH QUESTIONNAIRE - PHQ9
SUM OF ALL RESPONSES TO PHQ QUESTIONS 1-9: 9
5. POOR APPETITE OR OVEREATING: NEARLY EVERY DAY

## 2021-08-05 NOTE — PROGRESS NOTES
"    Assessment & Plan     Tonsillitis  Patient asked to take the prednisone if symptoms persist she is asked to make ENT appointment.  - predniSONE (DELTASONE) 20 MG tablet; Take 1 tablet (20 mg) by mouth daily for 5 days  - Otolaryngology Referral; Future            FUTURE APPOINTMENTS:       - Follow-up visit in 2-3 weeks or sooner as needed.    Return in about 3 weeks (around 8/26/2021), or if symptoms worsen or fail to improve, for Follow up.    Vera Foley MD  [facility]    Christina Leblanc is a 56 year old who presents for the following health issues     HPI 56 yr old female here for throat pain. This has been ongoing for a few weeks. She reports that she noticed a swollen nodule around her tonsils on the left side. She says she has no difficulty with swallowing. She has has had no fevers or chills. No systemic symptoms.     Concern - Throat problem  Onset: 4 weeks   Description: throat problem   Intensity: mild  Progression of Symptoms:  waxing and waning  Accompanying Signs & Symptoms: nodule right behind left tonsil; up towards top of tonsil, soreness, gets hot sometimes(all over body)- has not had hot flashes for years.   Does not have trouble swallowing.   Previous history of similar problem: none   Precipitating factors:        Worsened by: none   Alleviating factors:        Improved by: none   Therapies tried and outcome: None        Review of Systems   Constitutional, HEENT, cardiovascular, pulmonary, gi and gu systems are negative, except as otherwise noted.      Objective    /82   Pulse 86   Temp 97.8  F (36.6  C) (Tympanic)   Ht 1.657 m (5' 5.25\")   Wt 71.8 kg (158 lb 3.2 oz)   LMP 02/20/2015 (Exact Date)   SpO2 96%   BMI 26.12 kg/m    Body mass index is 26.12 kg/m .  Physical Exam  Constitutional:       Appearance: Normal appearance.   HENT:      Head: Normocephalic and atraumatic.      Right Ear: Tympanic membrane normal.      Left Ear: Tympanic " membrane normal.      Mouth/Throat:      Mouth: Mucous membranes are moist.        Comments: Tonsils appear more inflamed on the left side.  Eyes:      Extraocular Movements: Extraocular movements intact.      Pupils: Pupils are equal, round, and reactive to light.   Musculoskeletal:         General: Normal range of motion.      Cervical back: Normal range of motion. Tenderness present.   Lymphadenopathy:      Cervical: Cervical adenopathy present.      Right cervical: Superficial cervical adenopathy present.      Left cervical: Superficial cervical adenopathy present.   Skin:     General: Skin is warm.   Neurological:      Mental Status: She is alert and oriented to person, place, and time.   Psychiatric:         Mood and Affect: Mood normal.         Behavior: Behavior normal.

## 2021-08-06 ASSESSMENT — ANXIETY QUESTIONNAIRES: GAD7 TOTAL SCORE: 13

## 2021-08-13 ENCOUNTER — MYC MEDICAL ADVICE (OUTPATIENT)
Dept: FAMILY MEDICINE | Facility: CLINIC | Age: 56
End: 2021-08-13

## 2021-08-13 DIAGNOSIS — J03.90 TONSILLITIS: Primary | ICD-10-CM

## 2021-09-16 ENCOUNTER — MYC MEDICAL ADVICE (OUTPATIENT)
Dept: FAMILY MEDICINE | Facility: CLINIC | Age: 56
End: 2021-09-16

## 2021-09-16 DIAGNOSIS — E03.9 HYPOTHYROIDISM, UNSPECIFIED TYPE: Primary | ICD-10-CM

## 2021-09-29 ENCOUNTER — TELEPHONE (OUTPATIENT)
Dept: FAMILY MEDICINE | Facility: CLINIC | Age: 56
End: 2021-09-29
Payer: COMMERCIAL

## 2021-09-29 ENCOUNTER — MYC MEDICAL ADVICE (OUTPATIENT)
Dept: FAMILY MEDICINE | Facility: CLINIC | Age: 56
End: 2021-09-29

## 2021-09-29 NOTE — TELEPHONE ENCOUNTER
Pt sends mychart msg asking abouat having scans/xrays done at another facility sent to [facility].  Called pt & gave information on how to request records/scans & get these sent to [facility].  Pt verbalized understanding.    Yadira Wagner RN

## 2021-10-12 ENCOUNTER — TRANSFERRED RECORDS (OUTPATIENT)
Dept: HEALTH INFORMATION MANAGEMENT | Facility: CLINIC | Age: 56
End: 2021-10-12

## 2021-10-22 ENCOUNTER — MEDICAL CORRESPONDENCE (OUTPATIENT)
Dept: HEALTH INFORMATION MANAGEMENT | Facility: CLINIC | Age: 56
End: 2021-10-22
Payer: COMMERCIAL

## 2021-10-24 ENCOUNTER — HEALTH MAINTENANCE LETTER (OUTPATIENT)
Age: 56
End: 2021-10-24

## 2021-11-08 ENCOUNTER — MYC MEDICAL ADVICE (OUTPATIENT)
Dept: FAMILY MEDICINE | Facility: CLINIC | Age: 56
End: 2021-11-08
Payer: COMMERCIAL

## 2021-11-22 DIAGNOSIS — M06.9 ATROPHIC ARTHRITIS (H): Primary | ICD-10-CM

## 2021-11-25 ENCOUNTER — MYC MEDICAL ADVICE (OUTPATIENT)
Dept: FAMILY MEDICINE | Facility: CLINIC | Age: 56
End: 2021-11-25
Payer: COMMERCIAL

## 2021-11-25 DIAGNOSIS — G47.00 INSOMNIA, UNSPECIFIED TYPE: ICD-10-CM

## 2021-11-26 ENCOUNTER — IMMUNIZATION (OUTPATIENT)
Dept: FAMILY MEDICINE | Facility: CLINIC | Age: 56
End: 2021-11-26
Payer: COMMERCIAL

## 2021-11-26 DIAGNOSIS — Z23 HIGH PRIORITY FOR 2019-NCOV VACCINE: Primary | ICD-10-CM

## 2021-11-26 PROCEDURE — 0004A COVID-19,PF,PFIZER (12+ YRS): CPT

## 2021-11-26 PROCEDURE — 99207 PR NO CHARGE LOS: CPT

## 2021-11-26 PROCEDURE — 91300 COVID-19,PF,PFIZER (12+ YRS): CPT

## 2021-11-26 RX ORDER — HYDROXYZINE PAMOATE 25 MG/1
CAPSULE ORAL
Qty: 90 CAPSULE | Refills: 1 | Status: SHIPPED | OUTPATIENT
Start: 2021-11-26 | End: 2022-04-28

## 2021-11-26 NOTE — TELEPHONE ENCOUNTER
"Requested Prescriptions   Pending Prescriptions Disp Refills     hydrOXYzine (VISTARIL) 25 MG capsule 30 capsule 4     Sig: TAKE ONE CAPSULE BY MOUTH AT BEDTIME AS NEEDED FOR INSOMNIA       Antihistamines Protocol Passed - 11/26/2021  1:37 PM        Passed - Recent (12 mo) or future (30 days) visit within the authorizing provider's specialty     Patient has had an office visit with the authorizing provider or a provider within the authorizing providers department within the previous 12 mos or has a future within next 30 days. See \"Patient Info\" tab in inbasket, or \"Choose Columns\" in Meds & Orders section of the refill encounter.              Passed - Patient is age 3 or older     Apply age and/or weight-based dosing for peds patients age 3 and older.    Forward request to provider for patients under the age of 3.          Passed - Medication is active on med list             "

## 2021-11-29 ENCOUNTER — MYC MEDICAL ADVICE (OUTPATIENT)
Dept: FAMILY MEDICINE | Facility: CLINIC | Age: 56
End: 2021-11-29

## 2021-11-29 ENCOUNTER — LAB (OUTPATIENT)
Dept: LAB | Facility: CLINIC | Age: 56
End: 2021-11-29
Payer: COMMERCIAL

## 2021-11-29 DIAGNOSIS — M06.9 ATROPHIC ARTHRITIS (H): ICD-10-CM

## 2021-11-29 DIAGNOSIS — E03.9 HYPOTHYROIDISM, UNSPECIFIED TYPE: ICD-10-CM

## 2021-11-29 LAB
AST SERPL W P-5'-P-CCNC: 23 U/L (ref 0–45)
BASOPHILS # BLD AUTO: 0 10E3/UL (ref 0–0.2)
BASOPHILS NFR BLD AUTO: 0 %
CREAT SERPL-MCNC: 0.74 MG/DL (ref 0.52–1.04)
EOSINOPHIL # BLD AUTO: 0.2 10E3/UL (ref 0–0.7)
EOSINOPHIL NFR BLD AUTO: 2 %
ERYTHROCYTE [DISTWIDTH] IN BLOOD BY AUTOMATED COUNT: 12.9 % (ref 10–15)
GFR SERPL CREATININE-BSD FRML MDRD: 90 ML/MIN/1.73M2
HCT VFR BLD AUTO: 41.9 % (ref 35–47)
HGB BLD-MCNC: 14.4 G/DL (ref 11.7–15.7)
LYMPHOCYTES # BLD AUTO: 4.1 10E3/UL (ref 0.8–5.3)
LYMPHOCYTES NFR BLD AUTO: 49 %
MCH RBC QN AUTO: 34 PG (ref 26.5–33)
MCHC RBC AUTO-ENTMCNC: 34.4 G/DL (ref 31.5–36.5)
MCV RBC AUTO: 99 FL (ref 78–100)
MONOCYTES # BLD AUTO: 0.8 10E3/UL (ref 0–1.3)
MONOCYTES NFR BLD AUTO: 10 %
NEUTROPHILS # BLD AUTO: 3.3 10E3/UL (ref 1.6–8.3)
NEUTROPHILS NFR BLD AUTO: 40 %
PLATELET # BLD AUTO: 199 10E3/UL (ref 150–450)
RBC # BLD AUTO: 4.24 10E6/UL (ref 3.8–5.2)
TSH SERPL DL<=0.005 MIU/L-ACNC: 3.62 MU/L (ref 0.4–4)
WBC # BLD AUTO: 8.3 10E3/UL (ref 4–11)

## 2021-11-29 PROCEDURE — 84443 ASSAY THYROID STIM HORMONE: CPT

## 2021-11-29 PROCEDURE — 36415 COLL VENOUS BLD VENIPUNCTURE: CPT

## 2021-11-29 PROCEDURE — 85025 COMPLETE CBC W/AUTO DIFF WBC: CPT

## 2021-11-29 PROCEDURE — 82565 ASSAY OF CREATININE: CPT

## 2021-11-29 PROCEDURE — 84450 TRANSFERASE (AST) (SGOT): CPT

## 2021-11-30 ENCOUNTER — MYC MEDICAL ADVICE (OUTPATIENT)
Dept: FAMILY MEDICINE | Facility: CLINIC | Age: 56
End: 2021-11-30
Payer: COMMERCIAL

## 2021-11-30 RX ORDER — LEVOTHYROXINE SODIUM 125 UG/1
125 TABLET ORAL DAILY
Qty: 90 TABLET | Refills: 3 | Status: SHIPPED | OUTPATIENT
Start: 2021-11-30 | End: 2022-01-06

## 2021-12-06 DIAGNOSIS — G25.0 ESSENTIAL TREMOR: ICD-10-CM

## 2021-12-08 RX ORDER — PROPRANOLOL HYDROCHLORIDE 60 MG/1
1 TABLET ORAL 2 TIMES DAILY
Qty: 60 TABLET | Refills: 5 | Status: SHIPPED | OUTPATIENT
Start: 2021-12-08 | End: 2022-01-18

## 2021-12-08 NOTE — TELEPHONE ENCOUNTER
"Requested Prescriptions   Pending Prescriptions Disp Refills     propranolol (INDERAL) 60 MG tablet 60 tablet 5     Sig: Take 1 tablet (60 mg) by mouth 2 times daily       Beta-Blockers Protocol Passed - 12/6/2021  3:57 PM        Passed - Blood pressure under 140/90 in past 12 months     BP Readings from Last 3 Encounters:   08/05/21 124/82   05/21/21 100/60   01/22/21 96/71                 Passed - Patient is age 6 or older        Passed - Recent (12 mo) or future (30 days) visit within the authorizing provider's specialty     Patient has had an office visit with the authorizing provider or a provider within the authorizing providers department within the previous 12 mos or has a future within next 30 days. See \"Patient Info\" tab in inbasket, or \"Choose Columns\" in Meds & Orders section of the refill encounter.              Passed - Medication is active on med list         '  "

## 2022-01-06 ENCOUNTER — TELEPHONE (OUTPATIENT)
Dept: FAMILY MEDICINE | Facility: CLINIC | Age: 57
End: 2022-01-06
Payer: COMMERCIAL

## 2022-01-06 DIAGNOSIS — E03.9 HYPOTHYROIDISM, UNSPECIFIED TYPE: ICD-10-CM

## 2022-01-06 RX ORDER — LEVOTHYROXINE SODIUM 125 UG/1
125 TABLET ORAL DAILY
Qty: 90 TABLET | Refills: 0 | Status: SHIPPED | OUTPATIENT
Start: 2022-01-06 | End: 2022-06-27

## 2022-01-07 ENCOUNTER — MYC MEDICAL ADVICE (OUTPATIENT)
Dept: FAMILY MEDICINE | Facility: CLINIC | Age: 57
End: 2022-01-07
Payer: COMMERCIAL

## 2022-01-07 DIAGNOSIS — M06.00 SERONEGATIVE RHEUMATOID ARTHRITIS (H): Primary | ICD-10-CM

## 2022-01-10 RX ORDER — CELECOXIB 100 MG/1
100 CAPSULE ORAL DAILY
Qty: 90 CAPSULE | Refills: 1 | Status: SHIPPED | OUTPATIENT
Start: 2022-01-10 | End: 2022-07-07

## 2022-01-10 NOTE — TELEPHONE ENCOUNTER
Emma,    Patient sends my chart asking for us to refill her celebrex.      Routing refill request to provider for review/approval because:  Medication is reported/historical. Elina ZARAGOZA RN

## 2022-01-12 DIAGNOSIS — G25.0 ESSENTIAL TREMOR: ICD-10-CM

## 2022-01-18 RX ORDER — PROPRANOLOL HYDROCHLORIDE 60 MG/1
1 TABLET ORAL 2 TIMES DAILY
Qty: 180 TABLET | Refills: 3 | Status: SHIPPED | OUTPATIENT
Start: 2022-01-18 | End: 2023-03-23

## 2022-01-18 NOTE — TELEPHONE ENCOUNTER
Routing refill request to provider for review/approval because:  90 day supply request  Refills on file for 60 +5     Beta-Blockers Protocol Passed 01/12/2022 12:52 PM   Protocol Details  Blood pressure under 140/90 in past 12 months    Patient is age 6 or older    Recent (12 mo) or future (30 days) visit within the authorizing provider's specialty    Medication is active on med list        Pending Prescriptions:                       Disp   Refills    propranolol (INDERAL) 60 MG tablet         60 tab*5        Sig: Take 1 tablet (60 mg) by mouth 2 times daily    Rabia Martini RN on 1/18/2022 at 9:12 AM

## 2022-02-13 ENCOUNTER — HEALTH MAINTENANCE LETTER (OUTPATIENT)
Age: 57
End: 2022-02-13

## 2022-04-27 DIAGNOSIS — G47.00 INSOMNIA, UNSPECIFIED TYPE: ICD-10-CM

## 2022-04-28 RX ORDER — HYDROXYZINE PAMOATE 25 MG/1
CAPSULE ORAL
Qty: 90 CAPSULE | Refills: 0 | Status: SHIPPED | OUTPATIENT
Start: 2022-04-28 | End: 2023-06-22

## 2022-05-02 ENCOUNTER — MYC MEDICAL ADVICE (OUTPATIENT)
Dept: FAMILY MEDICINE | Facility: CLINIC | Age: 57
End: 2022-05-02

## 2022-06-13 ENCOUNTER — MYC MEDICAL ADVICE (OUTPATIENT)
Dept: FAMILY MEDICINE | Facility: CLINIC | Age: 57
End: 2022-06-13

## 2022-06-13 DIAGNOSIS — E78.00 PURE HYPERCHOLESTEROLEMIA: ICD-10-CM

## 2022-06-13 DIAGNOSIS — E03.9 HYPOTHYROIDISM, UNSPECIFIED TYPE: ICD-10-CM

## 2022-06-13 DIAGNOSIS — R10.13 EPIGASTRIC PAIN: Primary | ICD-10-CM

## 2022-06-13 DIAGNOSIS — G25.0 ESSENTIAL TREMOR: ICD-10-CM

## 2022-06-13 NOTE — TELEPHONE ENCOUNTER
Routed to provider.  Please see MyChart message from pt.  She was last seen 5/21/21.  Pt is asking for lab work before she retires.  Also, looks like pt is due for office visit.    I teed up BMP, lipids, tsh.  Not sure what else may be needed such as CBC?    Vy Harmon RN

## 2022-06-23 ENCOUNTER — LAB (OUTPATIENT)
Dept: LAB | Facility: CLINIC | Age: 57
End: 2022-06-23
Payer: COMMERCIAL

## 2022-06-23 DIAGNOSIS — E78.00 PURE HYPERCHOLESTEROLEMIA: ICD-10-CM

## 2022-06-23 DIAGNOSIS — M06.9 ATROPHIC ARTHRITIS (H): ICD-10-CM

## 2022-06-23 DIAGNOSIS — G25.0 ESSENTIAL TREMOR: ICD-10-CM

## 2022-06-23 DIAGNOSIS — R10.13 EPIGASTRIC PAIN: ICD-10-CM

## 2022-06-23 DIAGNOSIS — E03.9 HYPOTHYROIDISM, UNSPECIFIED TYPE: ICD-10-CM

## 2022-06-23 LAB
ALBUMIN SERPL-MCNC: 4.1 G/DL (ref 3.4–5)
ALP SERPL-CCNC: 93 U/L (ref 40–150)
ALT SERPL W P-5'-P-CCNC: 24 U/L (ref 0–50)
ANION GAP SERPL CALCULATED.3IONS-SCNC: 6 MMOL/L (ref 3–14)
AST SERPL W P-5'-P-CCNC: 15 U/L (ref 0–45)
BASOPHILS # BLD AUTO: 0 10E3/UL (ref 0–0.2)
BASOPHILS NFR BLD AUTO: 1 %
BILIRUB SERPL-MCNC: 1.3 MG/DL (ref 0.2–1.3)
BUN SERPL-MCNC: 12 MG/DL (ref 7–30)
CALCIUM SERPL-MCNC: 9.1 MG/DL (ref 8.5–10.1)
CHLORIDE BLD-SCNC: 108 MMOL/L (ref 94–109)
CHOLEST SERPL-MCNC: 246 MG/DL
CO2 SERPL-SCNC: 27 MMOL/L (ref 20–32)
CREAT SERPL-MCNC: 0.66 MG/DL (ref 0.52–1.04)
EOSINOPHIL # BLD AUTO: 0.2 10E3/UL (ref 0–0.7)
EOSINOPHIL NFR BLD AUTO: 2 %
ERYTHROCYTE [DISTWIDTH] IN BLOOD BY AUTOMATED COUNT: 13.1 % (ref 10–15)
FASTING STATUS PATIENT QL REPORTED: YES
GFR SERPL CREATININE-BSD FRML MDRD: >90 ML/MIN/1.73M2
GLUCOSE BLD-MCNC: 88 MG/DL (ref 70–99)
HCT VFR BLD AUTO: 46.4 % (ref 35–47)
HDLC SERPL-MCNC: 46 MG/DL
HGB BLD-MCNC: 15.9 G/DL (ref 11.7–15.7)
IMM GRANULOCYTES # BLD: 0 10E3/UL
IMM GRANULOCYTES NFR BLD: 0 %
LDLC SERPL CALC-MCNC: 171 MG/DL
LYMPHOCYTES # BLD AUTO: 3.7 10E3/UL (ref 0.8–5.3)
LYMPHOCYTES NFR BLD AUTO: 55 %
MAGNESIUM SERPL-MCNC: 2 MG/DL (ref 1.6–2.3)
MCH RBC QN AUTO: 32.8 PG (ref 26.5–33)
MCHC RBC AUTO-ENTMCNC: 34.3 G/DL (ref 31.5–36.5)
MCV RBC AUTO: 96 FL (ref 78–100)
MONOCYTES # BLD AUTO: 0.6 10E3/UL (ref 0–1.3)
MONOCYTES NFR BLD AUTO: 9 %
NEUTROPHILS # BLD AUTO: 2.3 10E3/UL (ref 1.6–8.3)
NEUTROPHILS NFR BLD AUTO: 33 %
NONHDLC SERPL-MCNC: 200 MG/DL
NRBC # BLD AUTO: 0 10E3/UL
NRBC BLD AUTO-RTO: 0 /100
PLATELET # BLD AUTO: 213 10E3/UL (ref 150–450)
POTASSIUM BLD-SCNC: 4 MMOL/L (ref 3.4–5.3)
PROT SERPL-MCNC: 7.8 G/DL (ref 6.8–8.8)
RBC # BLD AUTO: 4.85 10E6/UL (ref 3.8–5.2)
SODIUM SERPL-SCNC: 141 MMOL/L (ref 133–144)
TRIGL SERPL-MCNC: 143 MG/DL
TSH SERPL DL<=0.005 MIU/L-ACNC: 2.35 MU/L (ref 0.4–4)
WBC # BLD AUTO: 6.8 10E3/UL (ref 4–11)

## 2022-06-23 PROCEDURE — 80050 GENERAL HEALTH PANEL: CPT

## 2022-06-23 PROCEDURE — 83735 ASSAY OF MAGNESIUM: CPT

## 2022-06-23 PROCEDURE — 80061 LIPID PANEL: CPT

## 2022-06-23 PROCEDURE — 36415 COLL VENOUS BLD VENIPUNCTURE: CPT

## 2022-06-27 RX ORDER — LEVOTHYROXINE SODIUM 125 UG/1
125 TABLET ORAL DAILY
Qty: 90 TABLET | Refills: 0 | Status: SHIPPED | OUTPATIENT
Start: 2022-06-27 | End: 2022-07-08

## 2022-07-08 DIAGNOSIS — E03.9 HYPOTHYROIDISM, UNSPECIFIED TYPE: ICD-10-CM

## 2022-07-08 RX ORDER — LEVOTHYROXINE SODIUM 125 UG/1
TABLET ORAL
Qty: 90 TABLET | Refills: 2 | Status: SHIPPED | OUTPATIENT
Start: 2022-07-08 | End: 2022-11-16

## 2022-07-08 NOTE — TELEPHONE ENCOUNTER
"Prescription approved per [facility] Refill Protocol.    Pending Prescriptions:                       Disp   Refills    levothyroxine (SYNTHROID/LEVOTHROID) 125 *90 tab*2            Sig: TAKE 1 TABLET BY MOUTH EVERY DAY      Requested Prescriptions   Pending Prescriptions Disp Refills     levothyroxine (SYNTHROID/LEVOTHROID) 125 MCG tablet [Pharmacy Med Name: LEVOTHYROXINE 125 MCG TABLET] 90 tablet 2     Sig: TAKE 1 TABLET BY MOUTH EVERY DAY       Thyroid Protocol Passed - 7/8/2022  1:45 AM        Passed - Patient is 12 years or older        Passed - Recent (12 mo) or future (30 days) visit within the authorizing provider's specialty     Patient has had an office visit with the authorizing provider or a provider within the authorizing providers department within the previous 12 mos or has a future within next 30 days. See \"Patient Info\" tab in inbasket, or \"Choose Columns\" in Meds & Orders section of the refill encounter.              Passed - Medication is active on med list        Passed - Normal TSH on file in past 12 months     Recent Labs   Lab Test 06/23/22  0843   TSH 2.35              Passed - No active pregnancy on record     If patient is pregnant or has had a positive pregnancy test, please check TSH.          Passed - No positive pregnancy test in past 12 months     If patient is pregnant or has had a positive pregnancy test, please check TSH.             Rabia Martini RN on 7/8/2022 at 10:55 AM    "

## 2022-10-16 ENCOUNTER — HEALTH MAINTENANCE LETTER (OUTPATIENT)
Age: 57
End: 2022-10-16

## 2022-11-14 DIAGNOSIS — E03.9 HYPOTHYROIDISM, UNSPECIFIED TYPE: ICD-10-CM

## 2022-11-16 RX ORDER — LEVOTHYROXINE SODIUM 125 UG/1
TABLET ORAL
Qty: 90 TABLET | Refills: 0 | Status: SHIPPED | OUTPATIENT
Start: 2022-11-16 | End: 2024-05-31

## 2022-11-22 ENCOUNTER — LAB (OUTPATIENT)
Dept: LAB | Facility: CLINIC | Age: 57
End: 2022-11-22
Payer: COMMERCIAL

## 2022-11-22 DIAGNOSIS — M06.9 ATROPHIC ARTHRITIS (H): ICD-10-CM

## 2022-11-22 LAB
AST SERPL W P-5'-P-CCNC: 24 U/L (ref 10–35)
BASOPHILS # BLD AUTO: 0 10E3/UL (ref 0–0.2)
BASOPHILS NFR BLD AUTO: 0 %
CREAT SERPL-MCNC: 0.87 MG/DL (ref 0.51–0.95)
EOSINOPHIL # BLD AUTO: 0.1 10E3/UL (ref 0–0.7)
EOSINOPHIL NFR BLD AUTO: 2 %
ERYTHROCYTE [DISTWIDTH] IN BLOOD BY AUTOMATED COUNT: 13.7 % (ref 10–15)
GFR SERPL CREATININE-BSD FRML MDRD: 77 ML/MIN/1.73M2
HCT VFR BLD AUTO: 42.1 % (ref 35–47)
HGB BLD-MCNC: 14.3 G/DL (ref 11.7–15.7)
LYMPHOCYTES # BLD AUTO: 4.2 10E3/UL (ref 0.8–5.3)
LYMPHOCYTES NFR BLD AUTO: 48 %
MCH RBC QN AUTO: 34.5 PG (ref 26.5–33)
MCHC RBC AUTO-ENTMCNC: 34 G/DL (ref 31.5–36.5)
MCV RBC AUTO: 102 FL (ref 78–100)
MONOCYTES # BLD AUTO: 0.8 10E3/UL (ref 0–1.3)
MONOCYTES NFR BLD AUTO: 9 %
NEUTROPHILS # BLD AUTO: 3.7 10E3/UL (ref 1.6–8.3)
NEUTROPHILS NFR BLD AUTO: 42 %
PLATELET # BLD AUTO: 179 10E3/UL (ref 150–450)
RBC # BLD AUTO: 4.14 10E6/UL (ref 3.8–5.2)
WBC # BLD AUTO: 8.8 10E3/UL (ref 4–11)

## 2022-11-22 PROCEDURE — 85025 COMPLETE CBC W/AUTO DIFF WBC: CPT

## 2022-11-22 PROCEDURE — 36415 COLL VENOUS BLD VENIPUNCTURE: CPT

## 2022-11-22 PROCEDURE — 82565 ASSAY OF CREATININE: CPT

## 2022-11-22 PROCEDURE — 84450 TRANSFERASE (AST) (SGOT): CPT

## 2023-03-23 DIAGNOSIS — G25.0 ESSENTIAL TREMOR: ICD-10-CM

## 2023-03-23 RX ORDER — PROPRANOLOL HYDROCHLORIDE 60 MG/1
TABLET ORAL
Qty: 180 TABLET | Refills: 3 | Status: SHIPPED | OUTPATIENT
Start: 2023-03-23 | End: 2024-05-31

## 2023-03-23 NOTE — TELEPHONE ENCOUNTER
"Requested Prescriptions   Pending Prescriptions Disp Refills     propranolol (INDERAL) 60 MG tablet [Pharmacy Med Name: PROPRANOLOL 60 MG TABLET] 180 tablet 3     Sig: TAKE 1 TABLET BY MOUTH 2 TIMES DAILY       Beta-Blockers Protocol Failed - 3/23/2023  1:09 AM        Failed - Blood pressure under 140/90 in past 12 months     BP Readings from Last 3 Encounters:   08/05/21 124/82   05/21/21 100/60   01/22/21 96/71                 Failed - Recent (12 mo) or future (30 days) visit within the authorizing provider's specialty     Patient has had an office visit with the authorizing provider or a provider within the authorizing providers department within the previous 12 mos or has a future within next 30 days. See \"Patient Info\" tab in inbasket, or \"Choose Columns\" in Meds & Orders section of the refill encounter.              Passed - Patient is age 6 or older        Passed - Medication is active on med list             "

## 2023-03-26 ENCOUNTER — HEALTH MAINTENANCE LETTER (OUTPATIENT)
Age: 58
End: 2023-03-26

## 2023-04-26 ENCOUNTER — OFFICE VISIT (OUTPATIENT)
Dept: RHEUMATOLOGY | Facility: CLINIC | Age: 58
End: 2023-04-26
Payer: COMMERCIAL

## 2023-04-26 ENCOUNTER — ANCILLARY PROCEDURE (OUTPATIENT)
Dept: GENERAL RADIOLOGY | Facility: CLINIC | Age: 58
End: 2023-04-26
Attending: INTERNAL MEDICINE
Payer: COMMERCIAL

## 2023-04-26 VITALS
DIASTOLIC BLOOD PRESSURE: 74 MMHG | SYSTOLIC BLOOD PRESSURE: 102 MMHG | BODY MASS INDEX: 26.98 KG/M2 | WEIGHT: 163.4 LBS | HEART RATE: 79 BPM | OXYGEN SATURATION: 95 %

## 2023-04-26 DIAGNOSIS — M06.09 RHEUMATOID ARTHRITIS OF MULTIPLE SITES WITH NEGATIVE RHEUMATOID FACTOR (H): ICD-10-CM

## 2023-04-26 DIAGNOSIS — M06.09 RHEUMATOID ARTHRITIS OF MULTIPLE SITES WITH NEGATIVE RHEUMATOID FACTOR (H): Primary | ICD-10-CM

## 2023-04-26 DIAGNOSIS — G89.29 CHRONIC LEFT SHOULDER PAIN: ICD-10-CM

## 2023-04-26 DIAGNOSIS — Z79.899 HIGH RISK MEDICATION USE: ICD-10-CM

## 2023-04-26 DIAGNOSIS — M25.512 CHRONIC LEFT SHOULDER PAIN: ICD-10-CM

## 2023-04-26 LAB
ALBUMIN SERPL-MCNC: 3.6 G/DL (ref 3.4–5)
ALP SERPL-CCNC: 111 U/L (ref 40–150)
ALT SERPL W P-5'-P-CCNC: 31 U/L (ref 0–50)
AST SERPL W P-5'-P-CCNC: 16 U/L (ref 0–45)
BASOPHILS # BLD AUTO: 0 10E3/UL (ref 0–0.2)
BASOPHILS NFR BLD AUTO: 0 %
BILIRUB DIRECT SERPL-MCNC: <0.1 MG/DL (ref 0–0.2)
BILIRUB SERPL-MCNC: 0.7 MG/DL (ref 0.2–1.3)
CREAT SERPL-MCNC: 0.64 MG/DL (ref 0.52–1.04)
CRP SERPL-MCNC: <2.9 MG/L (ref 0–8)
EOSINOPHIL # BLD AUTO: 0.1 10E3/UL (ref 0–0.7)
EOSINOPHIL NFR BLD AUTO: 2 %
ERYTHROCYTE [DISTWIDTH] IN BLOOD BY AUTOMATED COUNT: 13.6 % (ref 10–15)
ERYTHROCYTE [SEDIMENTATION RATE] IN BLOOD BY WESTERGREN METHOD: 6 MM/HR (ref 0–30)
GFR SERPL CREATININE-BSD FRML MDRD: >90 ML/MIN/1.73M2
HCT VFR BLD AUTO: 43.7 % (ref 35–47)
HGB BLD-MCNC: 14.5 G/DL (ref 11.7–15.7)
LYMPHOCYTES # BLD AUTO: 3.5 10E3/UL (ref 0.8–5.3)
LYMPHOCYTES NFR BLD AUTO: 49 %
MCH RBC QN AUTO: 32 PG (ref 26.5–33)
MCHC RBC AUTO-ENTMCNC: 33.2 G/DL (ref 31.5–36.5)
MCV RBC AUTO: 97 FL (ref 78–100)
MONOCYTES # BLD AUTO: 0.5 10E3/UL (ref 0–1.3)
MONOCYTES NFR BLD AUTO: 8 %
NEUTROPHILS # BLD AUTO: 3 10E3/UL (ref 1.6–8.3)
NEUTROPHILS NFR BLD AUTO: 42 %
PLATELET # BLD AUTO: 193 10E3/UL (ref 150–450)
PROT SERPL-MCNC: 7.3 G/DL (ref 6.8–8.8)
RBC # BLD AUTO: 4.53 10E6/UL (ref 3.8–5.2)
WBC # BLD AUTO: 7.1 10E3/UL (ref 4–11)

## 2023-04-26 PROCEDURE — 99204 OFFICE O/P NEW MOD 45 MIN: CPT | Performed by: INTERNAL MEDICINE

## 2023-04-26 PROCEDURE — 80076 HEPATIC FUNCTION PANEL: CPT | Performed by: INTERNAL MEDICINE

## 2023-04-26 PROCEDURE — 86481 TB AG RESPONSE T-CELL SUSP: CPT | Performed by: INTERNAL MEDICINE

## 2023-04-26 PROCEDURE — 85652 RBC SED RATE AUTOMATED: CPT | Performed by: INTERNAL MEDICINE

## 2023-04-26 PROCEDURE — 86803 HEPATITIS C AB TEST: CPT | Performed by: INTERNAL MEDICINE

## 2023-04-26 PROCEDURE — 73630 X-RAY EXAM OF FOOT: CPT | Mod: TC | Performed by: RADIOLOGY

## 2023-04-26 PROCEDURE — 82565 ASSAY OF CREATININE: CPT | Performed by: INTERNAL MEDICINE

## 2023-04-26 PROCEDURE — 86140 C-REACTIVE PROTEIN: CPT | Performed by: INTERNAL MEDICINE

## 2023-04-26 PROCEDURE — 73130 X-RAY EXAM OF HAND: CPT | Mod: TC | Performed by: RADIOLOGY

## 2023-04-26 PROCEDURE — 87340 HEPATITIS B SURFACE AG IA: CPT | Performed by: INTERNAL MEDICINE

## 2023-04-26 PROCEDURE — 36415 COLL VENOUS BLD VENIPUNCTURE: CPT | Performed by: INTERNAL MEDICINE

## 2023-04-26 PROCEDURE — 85025 COMPLETE CBC W/AUTO DIFF WBC: CPT | Performed by: INTERNAL MEDICINE

## 2023-04-26 PROCEDURE — 86704 HEP B CORE ANTIBODY TOTAL: CPT | Performed by: INTERNAL MEDICINE

## 2023-04-26 RX ORDER — METHOTREXATE 2.5 MG/1
20 TABLET ORAL WEEKLY
Qty: 104 TABLET | Refills: 2 | Status: SHIPPED | OUTPATIENT
Start: 2023-04-26 | End: 2023-11-01

## 2023-04-26 RX ORDER — FOLIC ACID 1 MG/1
1 TABLET ORAL DAILY
Qty: 100 TABLET | Refills: 3 | Status: SHIPPED | OUTPATIENT
Start: 2023-04-26 | End: 2023-11-01

## 2023-04-26 NOTE — PROGRESS NOTES
"Message from Dr. Patel done.    Nghia Patel MD Johnson, Terri, DORON Torrez, please just send a coversheet to Dr. Myrna Schrader at [facility] stating \"Dr. Myrna Schrader, I have taken over Deana's rheumatology care and she is doing well. FYI. Thank you, Nghia Patel\".   The note itself does not need to be sent.       Lore Martini, [facility]  4/26/2023           "

## 2023-04-26 NOTE — PROGRESS NOTES
Rheumatology Clinic Visit      Tequila Neumann MRN# [id number]   YOB: 1965 Age: 58 year old      Date of visit: 4/26/23   PCP: Emma Beltran    Chief Complaint   Patient presents with:  Rheumatoid Arthritis    Assessment and Plan     1.  Seronegative rheumatoid arthritis: Reportedly diagnosed in 2010.  Previously followed by Keyon Leon, Devon Zaman, William Vaca and Myrna Schrader.  Establish care with me on 4/26/2023, at which time doing well on combination of methotrexate and Humira.  Previously on azathioprine, hydroxychloroquine, Orencia ([id number]; stopped at one point when doing well and then didn't work when it was restarted).  Reportedly initial presentation of tendinitis of the wrist.  Sulfa allergy.  Currently on methotrexate 20 mg oral once weekly and Humira 40 mg SQ every 14 days.  No synovitis on exam today.  Continue current regimen.  - Continue methotrexate 20 mg once weekly  - Continue folic acid 1 mg daily  - Continue Humira 40 mg SQ every 14 days  - X-rays today: Bilateral hands/feet, to establish a new baseline  - Labs today: CBC, creatinine, hepatic panel, ESR, CRP, hepatitis B core antibody and surface antigen, hepatitis C antibody, QuantiFERON-TB gold plus  - Labs in 3 months: CBC, Creatinine, Hepatic Panel (orders printed and given to patient so that she may have completed at an outside facility if needed, considering frequent travels and living in [place], [place], and [place])  - Labs in 6 months: CBC, Creatinine, Hepatic Panel, ESR, CRP (orders printed and given to patient so that she may have completed at an outside facility if needed, considering frequent travels and living in [place], [place], and [place])    High risk medication requiring intensive toxicity monitoring at least quarterly: labs ordered include CBC, Creatinine, Hepatic panel to monitor for cytopenia and hepatotoxicity; checking creatinine as it affects clearance of medication.     # Methotrexate Risks and Benefits: The risks and  benefits of methotrexate were discussed in detail and the patient verbalized understanding.  The risks discussed include, but are not limited to, the risk for hypersensitivity, anaphylaxis, anaphylactoid reactions, infections, bone marrow suppression, renal toxicity, hepatotoxicity, pulmonary toxicity, malignancy, impaired fertility, GI upset, alopecia, and oral and nasal sores.  Folic acid supplementation is recommended during methotrexate therapy to help prevent some of the side effects. Pregnancy prevention and planning was discussed; it is recommended that women of childbearing potential use reliable contraception during therapy.  The risks of taking both methotrexate and alcohol were reviewed; complete alcohol avoidance was discussed.  Routine laboratory monitoring is required during methotrexate therapy. Taking MTX once weekly, all within a 24 hour period was stressed and the patient verbalized this instruction back to me.  I encouraged reviewing the package insert and asking any questions about the medication.    # Adalimumab (Humira) Risks and Benefits: The risks and benefits of adalimumab were discussed in detail and the patient verbalized understanding.  The risks discussed include, but are not limited to, the risk for hypersensitivity, anaphylaxis, anaphylactoid reactions, an increased risk for serious infections leading to hospitalization or death, a possible increased risk for lymphoma and other malignancies, a possible worsening of demyelinating diseases, a possible worsening of heart failure, risk for cytopenias, risk for drug induced lupus, possible reactivation of hepatitis B, and possible reactivation of latent tuberculosis.  Subcutaneous injections may result in injection site reactions and/or pain at the site of injection.  The most common adverse reactions are infections, injection site reactions, headache, and rash.  It was discussed that the medication would need to be discontinued if a  serious infection develops.  It was discussed that live vaccinations should not be received while using adalimumab or within 30 days prior to starting adalimumab.  I encouraged reviewing the package insert and asking any questions about the medication.      2. Bilateral trochanteric bursitis    3. Left shoulder pain: started ~1/2023 when planking. Full ROM but pain with abduction >90 degrees. Advised PT. Provided that PT exercise handout available online from the [employer] and referred to PT. If no improvement or if worsening then should be re-evaluated at which time a steroid injection and/or additional imaging can be considered.  - Physical therapy referral    4.  Vaccinations: Vaccinations reviewed with Ms. Neumann.    - Influenza: encouraged yearly vaccination  - [id number]: up to date  - [id number]: up to date  - COVID-19: advised staying up to date    Total minutes spent in evaluation with patient, documentation, , and review of pertinent studies and chart notes: 46     Ms. Neumann verbalized agreement with and understanding of the rational for the diagnosis and treatment plan.  All questions were answered to best of my ability and the patient's satisfaction. Ms. Neumann was advised to contact the clinic with any questions that may arise after the clinic visit.      Thank you for involving me in the care of the patient    Return to clinic: 6 months      HPI   Tequila Neumann is a 58 year old female with a past medical history significant for migraines, hypothyroidism, insomnia, tremor, anxiety, hyperlipidemia, nutcracker esophagus history, and rheumatoid arthritis who presents for initial rheumatology evaluation in this clinic for rheumatoid arthritis.    Today, 4/26/2023:  Previously followed by Keyon Leon, Devon Zaman, William Vaca and Myrna Schrader.  Most recently was [facility] at the [facility].  Transferring care because of location; she lives in [place]  and [place] and comes to [place] where her family lives.  Previously was on azathioprine and hydroxychloroquine but she does not recall using these.  At one point was thought to have an allergy to methotrexate because of associated hives but is on it now and is doing well.  Previously was on  and Orencia and was doing well but at one point her rheumatologist felt that she may have fibromyalgia not rheumatoid arthritis Orencia was stopped and her arthritis flared, primarily in her knees.  Orencia was restarted at that time but not effective.  Therefore, changed to Humira in 2020 and has been doing well since then.  Also taking methotrexate 20 mg once weekly.  Has pain in her left shoulder since doing planks about 3 months ago, and it is improving with time.  Shoulder pain is worse with activity and improves with rest.  Also pain at the bilateral MTPs, occurs and resolves spontaneously.  History of cervical dystonia.  Bilateral hips hurt on the lateral aspect that is reproduced with self-evident palpation.    Denies fevers, chills, nausea, vomiting, constipation, diarrhea. No abdominal pain. No chest pain/pressure, palpitations, or shortness of breath. No LE swelling. No neck pain currently. No oral or nasal sores.  No rash. No sicca symptoms. No photosensitivity or photophobia. No eye pain or redness. No history of inflammatory eye or bowel disease.        Tobacco: Former cigarette smoker  EtOH: 3-4 drinks per week  Drugs: None  Occupation: Supply chain management at , now retired.  Now living on [facility] and [place], and visits [place] where family lives    ROS   12 point review of system was completed and negative except as noted in the HPI     Active Problem List     Patient Active Problem List   Diagnosis     ESSENTIAL TREMOR      Migraine     Hypothyroidism     Tobacco use disorder     RA (rheumatoid arthritis) (H)     CARDIOVASCULAR SCREENING; LDL GOAL LESS THAN 160     Hx of LASIK     Nutcracker  esophagus     Insomnia     Cervical dystonia     Symptomatic menopausal or female climacteric states     ASCUS with positive high risk HPV cervical     Myofascial muscle pain     Seronegative rheumatoid arthritis (H)     Torticollis, spasmodic     Avoids wheat/gluten and refined sugar and feeling better     EGD suggestive of achalasia     Familial dystonia     Tobacco dependence due to cigarettes     Personal history of other diseases of the digestive system     Fibromyalgia     Major depressive disorder, single episode, moderate (H)     Essential tremor     Anxiety     Hyperlipidemia LDL goal <100     Past Medical History     Past Medical History:   Diagnosis Date     Abnormal Pap smear of cervix 1985    s/p LEEP     Abnormal Pap smear of cervix 03/16/2018    See problem list     Avoids wheat/gluten and refined sugar and feeling better 1/10/2019     Calculus of GB w/ other cystitis 1/3/2012     Cervical high risk HPV (human papillomavirus) test positive 03/16/2018    See problem list     EGD suggestive of achalasia 1/10/2019     Familial dystonia 1/10/2019     Fibromyalgia      Past Surgical History     Past Surgical History:   Procedure Laterality Date     ARTHROSCOPY KNEE RT/LT Left 12/08/2005    Left medial meniscal tear bilat     COLONOSCOPY  7/11/2014    Procedure: COLONOSCOPY;  Surgeon: Randell Mcgarry MD;  Location: [facility]     D & C  x 2     ESOPHAGOSCOPY, GASTROSCOPY, DUODENOSCOPY (EGD), COMBINED  7/16/2012    Procedure: COMBINED ESOPHAGOSCOPY, GASTROSCOPY, DUODENOSCOPY (EGD);;  Surgeon: Lito Kwon MD;  Location:  GI     HC ESOPH/GAS REFLUX TEST W NASAL IMPED ELECTRODE  8/23/2012    Procedure: ESOPHAGEAL IMPEDENCE FUNCTION TEST 1 HOUR OR LESS;  Surgeon: Tequila Boone MD;  Location: [facility]     LAPAROSCOPIC CHOLECYSTECTOMY  1/25/2012    Procedure:LAPAROSCOPIC CHOLECYSTECTOMY; Laparoscopic vs Open Cholecystectomy; Surgeon:RANDELL MCGARRY; Location:[facility]  Bilateral 2000     LEEP TX, CERVICAL  1985     TUBAL LIGATION  1998     Allergy     Allergies   Allergen Reactions     Primidone      Made her feel strange and out of it     Sulfa Antibiotics Nausea     Topamax      Personality change; memory issues     Topiramate Other (See Comments)     Personality change. Memory   Personality change; memory issues     Plaquenil [Hydroxychloroquine Sulfate] Rash     Current Medication List     Current Outpatient Medications   Medication Sig     adalimumab (HUMIRA *CF*) 40 MG/0.4ML pen kit Inject 0.4 mLs (40 mg) Subcutaneous every 14 days . Hold for signs of infection, then seek medical attention.     celecoxib (CELEBREX) 100 MG capsule Take 1 capsule (100 mg) by mouth daily NO FURTHER REFILLS UNTIL SEEN BY  PROVIDER.     cholecalciferol (VITAMIN D) 1000 UNIT tablet Take 1 tablet by mouth daily.     folic acid (FOLVITE) 1 MG tablet Take 1 tablet (1 mg) by mouth daily     levothyroxine (SYNTHROID/LEVOTHROID) 125 MCG tablet TAKE 1 TABLET (125 MCG) BY MOUTH DAILY     methocarbamol (ROBAXIN) 500 MG tablet 1-2 x 500mg tab by mouth nightly as needed     methotrexate sodium 2.5 MG TABS Take 8 tablets (20 mg) by mouth once a week . This is a once weekly medication, taken on the same day of each week.     propranolol (INDERAL) 60 MG tablet TAKE 1 TABLET BY MOUTH 2 TIMES DAILY     [facility] NICOTINE 14 MG/24HR 24 hr patch As needed (Patient not taking: Reported on 8/5/2021)     hydrOXYzine (VISTARIL) 25 MG capsule TAKE ONE CAPSULE BY MOUTH AT BEDTIME AS NEEDED FOR INSOMNIA (Patient not taking: Reported on 4/26/2023)     ketoconazole (NIZORAL) 2 % external cream As needed (Patient not taking: Reported on 4/26/2023)     simvastatin (ZOCOR) 20 MG tablet Take 1 tablet (20 mg) by mouth At Bedtime (Patient not taking: Reported on 8/5/2021)     No current facility-administered medications for this visit.     Social History   See HPI    Family History     Family History   Problem Relation Age of Onset      "C.A.D. Father      Diabetes Father      Hypertension Father      Cerebrovascular Disease Father      Lipids Father      Breast Cancer Maternal Grandmother         age 80+     Alzheimer Disease Maternal Grandmother         age 70+     Cancer Mother         sarcoma, small intestinal     Neurologic Disorder Mother         tremor     Tremor Mother      Dystonia Mother      Colon Polyps Sister      Other - See Comments Sister         no clear stroke     Dystonia Daughter      Cancer - colorectal No family hx of      Prostate Cancer No family hx of      Physical Exam     Temp Readings from Last 3 Encounters:   08/05/21 97.8  F (36.6  C) (Tympanic)   05/21/21 97.9  F (36.6  C) (Tympanic)   11/06/20 98.7  F (37.1  C) (Tympanic)     BP Readings from Last 5 Encounters:   04/26/23 102/74   08/05/21 124/82   05/21/21 100/60   01/22/21 96/71   11/06/20 100/76     Pulse Readings from Last 1 Encounters:   04/26/23 79     Resp Readings from Last 1 Encounters:   05/21/21 16     Estimated body mass index is 26.98 kg/m  as calculated from the following:    Height as of 8/5/21: 1.657 m (5' 5.25\").    Weight as of this encounter: 74.1 kg (163 lb 6.4 oz).    GEN: NAD. Healthy appearing adult.   HEENT:  Anicteric, noninjected sclera. No obvious external lesions of the ear and nose. Hearing intact.  PULM: No increased work of breathing.  MSK: MCPs, PIPs, DIPs without swelling or tenderness to palpation.  Wrists without swelling or tenderness to palpation.  Elbows and shoulders without swelling or tenderness to palpation.  Left shoulder painful with abduction >90 degrees; negative lift off; negative empty can; has full ROM.  Knees, ankles, and MTPs without swelling or tenderness to palpation.  Minimal padding under the MTPs.   SKIN: No rash or jaundice seen  PSYCH: Alert. Appropriate.     Labs / Imaging (select studies)     CBC  Recent Labs   Lab Test 11/22/22  1522 06/23/22  0843 11/29/21  1546 05/21/21  0855 03/19/20  1454 01/10/20  1013 "   WBC 8.8 6.8 8.3 6.6 7.8 8.6   RBC 4.14 4.85 4.24 4.58 4.45 4.75   HGB 14.3 15.9* 14.4 14.9 14.2 15.0   HCT 42.1 46.4 41.9 43.3 43.1 46.2   * 96 99 95 97 97   RDW 13.7 13.1 12.9 13.4 14.7 12.7    213 199 193 250 241   MCH 34.5* 32.8 34.0* 32.5 31.9 31.6   MCHC 34.0 34.3 34.4 34.4 32.9 32.5   NEUTROPHIL 42 33 40 32.4 47.8 37.8   LYMPH 48 55 49 55.9 41.1 51.2   MONOCYTE 9 9 10 9.0 9.1 7.7   EOSINOPHIL 2 2 2 2.0 1.7 2.2   BASOPHIL 0 1 0 0.5 0.3 0.5   ANEU  --   --   --  2.1 3.8 3.2   ALYM  --   --   --  3.7 3.2 4.4   GAYATHRI  --   --   --  0.6 0.7 0.7   AEOS  --   --   --  0.1 0.1 0.2   ABAS  --   --   --  0.0 0.0 0.0   ANEUTAUTO 3.7 2.3 3.3  --   --   --    ALYMPAUTO 4.2 3.7 4.1  --   --   --    AMONOAUTO 0.8 0.6 0.8  --   --   --    AEOSAUTO 0.1 0.2 0.2  --   --   --    ABSBASO 0.0 0.0 0.0  --   --   --      CMP  Recent Labs   Lab Test 11/22/22  1522 06/23/22  0843 11/29/21  1546 05/21/21  0855 11/01/19  1029 09/17/18  0711 02/20/15  0840   NA  --  141  --  136 136 138 140   POTASSIUM  --  4.0  --  4.0 3.8 3.8 3.9   CHLORIDE  --  108  --  103 103 105 108   CO2  --  27  --  29 30 29 27   ANIONGAP  --  6  --  4 3 4 5   GLC  --  88  --  89 87 88 81   BUN  --  12  --  13 13 16 11   CR 0.87 0.66 0.74 0.72 0.64 0.80 0.68   GFRESTIMATED 77 >90 90 >90 >90 74 >90  Non  GFR Calc     GFRESTBLACK  --   --   --  >90 >90 >90 >90  African American GFR Calc     CELI  --  9.1  --  8.6 9.0 8.3* 7.9*   BILITOTAL  --  1.3  --  1.0  --   --  0.7   ALBUMIN  --  4.1  --  3.8  --   --  3.4   PROTTOTAL  --  7.8  --  7.1  --   --  6.7*   ALKPHOS  --  93  --  119  --   --  73   AST 24 15 23 15  --   --  15   ALT  --  24  --  27  --   --  22       Calcium/VitaminD  Recent Labs   Lab Test 06/23/22  0843 05/21/21  0855 11/01/19  1029 09/17/18  0711 05/22/17  1713 08/01/16  1010   CELI 9.1 8.6 9.0   < >  --   --    VITDT  --   --  72  --  40 52    < > = values in this interval not displayed.     ESR/CRP  Recent Labs   Lab  Test 05/21/21  0855 03/19/20  1454 05/22/17  1713   SED 6 14 6   CRP <2.9 15.6* <2.9     Immunization History     Immunization History   Administered Date(s) Administered     COVID-19 Vaccine 12+ (Pfizer) 11/26/2021     COVID-19 Vaccine 18+ (Moderna) 04/22/2021     Influenza (IIV3) PF 09/28/2010, 10/20/2011, 11/19/2012, 10/01/2018     Influenza Vaccine >6 months (Alfuria,Fluzone) 11/13/2019     Pneumo Conj 13-V (2010&after) 04/01/2019     Pneumococcal 23 valent 11/19/2012     TD,PF 7+ (Tenivac) 01/01/1984, 05/13/1997, 06/27/2003     TDAP Vaccine (Adacel) 07/28/2014     Td (Adult), Adsorbed 06/27/2000          Chart documentation done in part with Dragon Voice recognition Software. Although reviewed after completion, some word and grammatical error may remain.    Nghia Patel MD

## 2023-04-26 NOTE — PATIENT INSTRUCTIONS
RHEUMATOLOGY    Dr. Nghia Patel    [facility]  [address] [place] [address]  Phone number: [phone]  Fax number: [phone]      Thank you for choosing [facility]!

## 2023-04-26 NOTE — Clinical Note
"Lore, please just send a coversheet to Dr. Myrna Schrader at [place] stating \"Dr. Myrna Schrader, I have taken over Deana's rheumatology care and she is doing well. FYI. Thank you, Nghia Patel\".   The note itself does not need to be sent.   Thanks! Nghia"

## 2023-04-27 LAB
HBV CORE AB SERPL QL IA: NONREACTIVE
HBV SURFACE AG SERPL QL IA: NONREACTIVE
HCV AB SERPL QL IA: NONREACTIVE

## 2023-04-28 LAB
GAMMA INTERFERON BACKGROUND BLD IA-ACNC: 0.02 IU/ML
M TB IFN-G BLD-IMP: POSITIVE
M TB IFN-G CD4+ BCKGRND COR BLD-ACNC: 9.98 IU/ML
MITOGEN IGNF BCKGRD COR BLD-ACNC: 0.01 IU/ML
MITOGEN IGNF BCKGRD COR BLD-ACNC: 0.46 IU/ML
QUANTIFERON MITOGEN: 10 IU/ML
QUANTIFERON NIL TUBE: 0.02 IU/ML
QUANTIFERON TB1 TUBE: 0.48 IU/ML
QUANTIFERON TB2 TUBE: 0.03

## 2023-05-04 DIAGNOSIS — R76.12 POSITIVE QUANTIFERON-TB GOLD TEST: Primary | ICD-10-CM

## 2023-05-05 NOTE — RESULT ENCOUNTER NOTE
RN: Please call to notify Tequila Neumann that the tuberculosis test was positive.  Therefore, a chest x-rays is needed and the blood test for tuberculosis needs to be repeated within the next 2 weeks. Please assist with scheduling a lab appointment.   Do not take Humira at this time.     Nghia Patel MD  5/4/2023

## 2023-05-08 ENCOUNTER — TELEPHONE (OUTPATIENT)
Dept: RHEUMATOLOGY | Facility: CLINIC | Age: 58
End: 2023-05-08
Payer: COMMERCIAL

## 2023-05-08 NOTE — TELEPHONE ENCOUNTER
[facility] Call Center    Phone Message    May a detailed message be left on voicemail: yes     Reason for Call: Other: Pt would like chest CT scan orders sent to [facility]. Fax: [phone].     Action Taken: Message routed to:  Other: [facility]    Travel Screening: Not Applicable

## 2023-05-09 NOTE — TELEPHONE ENCOUNTER
Sent, RN called pt but phone rings without flipping to . Will send MARY Magana RN 5/9/2023 11:11 AM

## 2023-05-10 ENCOUNTER — ANCILLARY PROCEDURE (OUTPATIENT)
Dept: RADIOLOGY | Facility: CLINIC | Age: 58
End: 2023-05-10
Payer: COMMERCIAL

## 2023-05-10 ENCOUNTER — TRANSFERRED RECORDS (OUTPATIENT)
Dept: HEALTH INFORMATION MANAGEMENT | Facility: CLINIC | Age: 58
End: 2023-05-10
Payer: COMMERCIAL

## 2023-05-10 PROCEDURE — 71046 X-RAY EXAM CHEST 2 VIEWS: CPT | Mod: TC | Performed by: PHYSICIAN ASSISTANT

## 2023-05-10 PROCEDURE — 71046 X-RAY EXAM CHEST 2 VIEWS: CPT | Mod: 26 | Performed by: INTERNAL MEDICINE

## 2023-05-19 ENCOUNTER — TELEPHONE (OUTPATIENT)
Dept: RHEUMATOLOGY | Facility: CLINIC | Age: 58
End: 2023-05-19
Payer: COMMERCIAL

## 2023-05-19 NOTE — TELEPHONE ENCOUNTER
[facility] Call Center    Phone Message    May a detailed message be left on voicemail: no, pt's phone does not have voicemail. If she does not answer, please send detailed [employer] (f[employer]) message.      Reason for Call: Pt had positive Quantiferon Gold test done on 4/26/23, had subsequent chest x-ray done on 5/10/23 (results available in Care Everywhere).  Pt would like to make sure her results look ok, and find out what the next steps should be for her (does she need to start on medications to treat TB, or is she ok to restart her RA medications)?     Action Taken: Message routed to:  Other: [facility] RN POOL    Travel Screening: Not Applicable

## 2023-05-26 NOTE — TELEPHONE ENCOUNTER
RN: Please call to notify Tequila Neumann that the chest x-ray did not show evidence of pulmonary disease.  A repeat QuantiFERON-TB gold plus blood test is still needed and I do not see results of this.  Has she had a repeat test completed yet?  If she is having the repeat QuantiFERON-TB gold plus at an outside facility then please send the order to that facility and call to confirm its receipt.    Nghia Patel MD  5/26/2023

## 2023-05-30 NOTE — TELEPHONE ENCOUNTER
Called patient and there was no answer. Left message to call back. Also, sent patient [employer] message with information as instructed in encounter.    Joanna MONSON RN, [facility] 05/30/23 10:14 AM

## 2023-05-31 NOTE — TELEPHONE ENCOUNTER
Called and spoke to patient. She has the TB test scheduled for next Monday. Patient stated she received messages and has not further questions as this time.     Joanna MONSON RN, Specialty Clinic 05/31/23 3:22 PM

## 2023-06-05 ENCOUNTER — OFFICE VISIT (OUTPATIENT)
Dept: FAMILY MEDICINE | Facility: CLINIC | Age: 58
End: 2023-06-05
Payer: COMMERCIAL

## 2023-06-05 VITALS
HEART RATE: 66 BPM | BODY MASS INDEX: 26.68 KG/M2 | HEIGHT: 66 IN | WEIGHT: 166 LBS | TEMPERATURE: 97.4 F | DIASTOLIC BLOOD PRESSURE: 87 MMHG | SYSTOLIC BLOOD PRESSURE: 125 MMHG | OXYGEN SATURATION: 97 %

## 2023-06-05 DIAGNOSIS — E89.0 POSTABLATIVE HYPOTHYROIDISM: Primary | ICD-10-CM

## 2023-06-05 DIAGNOSIS — Z12.31 ENCOUNTER FOR SCREENING MAMMOGRAM FOR MALIGNANT NEOPLASM OF BREAST: ICD-10-CM

## 2023-06-05 PROCEDURE — 99203 OFFICE O/P NEW LOW 30 MIN: CPT | Performed by: PHYSICIAN ASSISTANT

## 2023-06-05 RX ORDER — LEVOTHYROXINE SODIUM 112 UG/1
112 TABLET ORAL DAILY
COMMUNITY
Start: 2023-04-01 | End: 2023-06-29 | Stop reason: DRUGHIGH

## 2023-06-05 RX ORDER — METHOTREXATE 2.5 MG/1
20 TABLET ORAL WEEKLY
COMMUNITY
Start: 2022-06-28

## 2023-06-05 RX ORDER — FOLIC ACID 1 MG/1
1 TABLET ORAL DAILY
COMMUNITY
Start: 2022-12-27

## 2023-06-05 RX ORDER — CELECOXIB 100 MG/1
100 CAPSULE ORAL DAILY
COMMUNITY
Start: 2022-07-07

## 2023-06-05 RX ORDER — PROPRANOLOL HYDROCHLORIDE 60 MG/1
1 TABLET ORAL 2 TIMES DAILY
COMMUNITY
Start: 2023-03-23

## 2023-06-05 RX ORDER — CHOLECALCIFEROL (VITAMIN D3) 25 MCG
1 TABLET ORAL DAILY
COMMUNITY

## 2023-06-05 NOTE — PROGRESS NOTES
Subjective      Dana Mac is a 58 y.o. female who presents to establish care.    Dana comes in today to establish care.  She recently (6-8 weeks ago) stopped her T3 medication because it was causing terrible tremors.  She was on levothyroxine 125mcg daily, but was lowered to 112mcg because the T3 was added at 25mg. She was on the T3 for about 5 months prior to stopping it.  She also sees rheumatology in [place] for rheumatoid arthritis and that condition is currently stable.  She is otherwise generally healthy.  She knows she's overdue for a mammogram so would like to have that ordered.        The following have been reviewed and updated as appropriate in this visit:   Allergies  Meds  Med Hx  Surg Hx  Fam Hx         Allergies   Allergen Reactions    Primidone Other (see comments)     Made her feel strange and out of it  Made her feel strange and out of it      Sulfa (Sulfonamide Antibiotics) GI intolerance    Hydroxychloroquine Sulfate Rash     Current Outpatient Medications   Medication Sig Dispense Refill    adalimumab (HUMIRA) 40 mg/0.4 mL pen injector kit (citrate-free) Inject 0.4 mL (40 mg total) under the skin      celecoxib (CeleBREX) 100 mg capsule Take 1 capsule (100 mg total) by mouth daily Taking as needed for pain      cholecalciferol, vitamin D3, 25 mcg (1,000 unit) tablet Take 1 tablet (1,000 Units total) by mouth daily      folic acid 1 mg tablet Take 1 tablet (1 mg total) by mouth daily      levothyroxine (SYNTHROID, LEVOTHROID) 112 mcg tablet Take 1 tablet (112 mcg total) by mouth daily      methotrexate 2.5 mg tablet Take 8 tablets (20 mg total) by mouth      propranoloL (INDERAL) 60 mg tablet Take 1 tablet (60 mg total) by mouth 2 (two) times a day       No current facility-administered medications for this visit.     Past Medical History:   Diagnosis Date    Disease of thyroid gland     Grave's disease    Rheumatoid arthritis (CMS/HCC)      Past Surgical History:   Procedure Laterality  "Date    CHOLECYSTECTOMY       Family History   Problem Relation Age of Onset    Cancer Mother         sarcoma of intestine    Hypertension Father     Hyperlipidemia Father     Heart attack Father     Heart failure Father     No Known Problems Sister     Tremor Daughter     No Known Problems Daughter     Breast cancer Maternal Grandmother 98    Stroke Paternal Grandmother     Cancer Paternal Grandfather      Social History     Socioeconomic History    Marital status: Unknown   Tobacco Use    Smoking status: Former     Packs/day: 0.50     Years: 30.00     Pack years: 15.00     Types: Cigarettes     Quit date: 2023     Years since quittin.4    Smokeless tobacco: Never   Vaping Use    Vaping Use: Never used   Substance and Sexual Activity    Alcohol use: Yes     Comment: occasional    Drug use: Never     Social Determinants of Health     Tobacco Use: Medium Risk (2023)    Patient History     Smoking Tobacco Use: Former     Smokeless Tobacco Use: Never       Review of Systems    Objective   /87 (BP Location: Left arm, Patient Position: Sitting, Cuff Size: Regular Adult)   Pulse 66   Temp 36.3 °C (97.4 °F) (Temporal)   Ht 1.676 m (5' 6\")   Wt 75.3 kg (166 lb)   SpO2 97%   BMI 26.79 kg/m²     Physical Exam  Vitals and nursing note reviewed. Exam conducted with a chaperone present.   Constitutional:       Appearance: Normal appearance. She is well-developed and well-groomed.   HENT:      Head:      Jaw: There is normal jaw occlusion.      Right Ear: Tympanic membrane and ear canal normal.      Left Ear: Tympanic membrane and ear canal normal.      Mouth/Throat:      Mouth: Mucous membranes are moist.      Pharynx: Oropharynx is clear.   Eyes:      Extraocular Movements: Extraocular movements intact.      Conjunctiva/sclera: Conjunctivae normal.      Pupils: Pupils are equal, round, and reactive to light.   Cardiovascular:      Rate and Rhythm: Normal rate and regular rhythm.      Heart sounds: Normal " heart sounds, S1 normal and S2 normal.   Pulmonary:      Effort: Pulmonary effort is normal.      Breath sounds: Normal breath sounds and air entry.   Chest:   Breasts:     Right: Normal.      Left: Normal.   Abdominal:      Palpations: Abdomen is soft.      Tenderness: There is no abdominal tenderness.   Musculoskeletal:      Cervical back: Torticollis present. Pain with movement present. Decreased range of motion (very little ability to turn her head to the right).   Lymphadenopathy:      Cervical: No cervical adenopathy.   Skin:     General: Skin is warm and dry.   Neurological:      General: No focal deficit present.      Mental Status: She is alert and oriented to person, place, and time.   Psychiatric:         Attention and Perception: Attention and perception normal.         Mood and Affect: Mood and affect normal.         Speech: Speech normal.         Behavior: Behavior normal. Behavior is cooperative.         Thought Content: Thought content normal.         ASSESSMENT AND PLAN   Diagnoses and all orders for this visit:    Postablative hypothyroidism  -     Thyroid Stimulating Hormone, Ultrasensitive Blood, Venous; Future  -     T3, free Blood, Venous; Future    Encounter for screening mammogram for malignant neoplasm of breast  -     BI screening mammogram bilateral w jace; Future    She will return tomorrow for a lab draw.  Changes to her meds will be made, if indicated.  She may be able to tolerate a smaller dose of the T3 if her T3 comes back low.      I did order a mammogram.  I will review her records to see if she needs a repeat DXA and will order that, if indicated.  She notes her last colonoscopy at age 49 was normal, so she would be due for that next year.    KARTHIK Cheng

## 2023-06-06 ENCOUNTER — MEDICAL CORRESPONDENCE (OUTPATIENT)
Dept: HEALTH INFORMATION MANAGEMENT | Facility: CLINIC | Age: 58
End: 2023-06-06

## 2023-06-06 ENCOUNTER — LAB (OUTPATIENT)
Dept: LAB | Facility: CLINIC | Age: 58
End: 2023-06-06
Payer: COMMERCIAL

## 2023-06-06 DIAGNOSIS — R76.12 POSITIVE QUANTIFERON-TB GOLD TEST: ICD-10-CM

## 2023-06-06 DIAGNOSIS — E89.0 POSTABLATIVE HYPOTHYROIDISM: ICD-10-CM

## 2023-06-06 LAB
T3FREE SERPL-MCNC: 2.23 PG/ML (ref 2–3.5)
T4 FREE SERPL-MCNC: 0.62 NG/DL (ref 0.65–1.44)
TSH SERPL DL<=0.05 MIU/L-ACNC: 21.88 UIU/ML (ref 0.34–4.82)

## 2023-06-06 PROCEDURE — 84443 ASSAY THYROID STIM HORMONE: CPT | Performed by: PHYSICIAN ASSISTANT

## 2023-06-06 PROCEDURE — 84481 FREE ASSAY (FT-3): CPT | Performed by: PHYSICIAN ASSISTANT

## 2023-06-06 PROCEDURE — 84439 ASSAY OF FREE THYROXINE: CPT | Performed by: PHYSICIAN ASSISTANT

## 2023-06-06 PROCEDURE — 86481 TB AG RESPONSE T-CELL SUSP: CPT | Performed by: PHYSICIAN ASSISTANT

## 2023-06-06 PROCEDURE — 36415 COLL VENOUS BLD VENIPUNCTURE: CPT | Performed by: PHYSICIAN ASSISTANT

## 2023-06-07 ENCOUNTER — TRANSFERRED RECORDS (OUTPATIENT)
Dept: HEALTH INFORMATION MANAGEMENT | Facility: CLINIC | Age: 58
End: 2023-06-07
Payer: COMMERCIAL

## 2023-06-07 LAB
M TB TUBERC IFN-G BLD QL: NEGATIVE
TBGOLD MITO: 9.96 IU/ML
TBGOLD NIL: 0.04 IU/ML
TBGOLD TB1-NIL: 0.01 IU/ML
TBGOLD TB2-NIL: 0.01 IU/ML

## 2023-06-07 RX ORDER — LIOTHYRONINE SODIUM 5 UG/1
5 TABLET ORAL DAILY
Qty: 60 TABLET | Refills: 0 | Status: SHIPPED | OUTPATIENT
Start: 2023-06-07 | End: 2023-06-07

## 2023-06-22 ENCOUNTER — HOSPITAL ENCOUNTER (OUTPATIENT)
Dept: ULTRASOUND IMAGING | Facility: CLINIC | Age: 58
Discharge: HOME | End: 2023-06-22
Attending: FAMILY MEDICINE | Admitting: FAMILY MEDICINE
Payer: COMMERCIAL

## 2023-06-22 ENCOUNTER — OFFICE VISIT (OUTPATIENT)
Dept: FAMILY MEDICINE | Facility: CLINIC | Age: 58
End: 2023-06-22
Payer: COMMERCIAL

## 2023-06-22 VITALS
HEART RATE: 77 BPM | SYSTOLIC BLOOD PRESSURE: 118 MMHG | TEMPERATURE: 97.4 F | HEIGHT: 65 IN | BODY MASS INDEX: 27.16 KG/M2 | OXYGEN SATURATION: 95 % | WEIGHT: 163 LBS | DIASTOLIC BLOOD PRESSURE: 84 MMHG | RESPIRATION RATE: 18 BRPM

## 2023-06-22 DIAGNOSIS — M79.89 LEG SWELLING: ICD-10-CM

## 2023-06-22 DIAGNOSIS — Z12.11 SCREEN FOR COLON CANCER: ICD-10-CM

## 2023-06-22 DIAGNOSIS — M79.89 LEG SWELLING: Primary | ICD-10-CM

## 2023-06-22 DIAGNOSIS — I82.811 LEG VEIN THROMBOEMBOLISM, SUPERFICIAL, RIGHT: ICD-10-CM

## 2023-06-22 DIAGNOSIS — Z12.31 VISIT FOR SCREENING MAMMOGRAM: ICD-10-CM

## 2023-06-22 PROCEDURE — 93971 EXTREMITY STUDY: CPT | Mod: RT

## 2023-06-22 PROCEDURE — 99214 OFFICE O/P EST MOD 30 MIN: CPT | Performed by: FAMILY MEDICINE

## 2023-06-22 ASSESSMENT — ENCOUNTER SYMPTOMS
PSYCHIATRIC NEGATIVE: 1
ENDOCRINE NEGATIVE: 1
RESPIRATORY NEGATIVE: 1
CARDIOVASCULAR NEGATIVE: 1
ALLERGIC/IMMUNOLOGIC NEGATIVE: 1
NEUROLOGICAL NEGATIVE: 1
ARTHRALGIAS: 1
GASTROINTESTINAL NEGATIVE: 1
EYES NEGATIVE: 1
CONSTITUTIONAL NEGATIVE: 1

## 2023-06-22 ASSESSMENT — PATIENT HEALTH QUESTIONNAIRE - PHQ9
SUM OF ALL RESPONSES TO PHQ QUESTIONS 1-9: 9
SUM OF ALL RESPONSES TO PHQ QUESTIONS 1-9: 9
10. IF YOU CHECKED OFF ANY PROBLEMS, HOW DIFFICULT HAVE THESE PROBLEMS MADE IT FOR YOU TO DO YOUR WORK, TAKE CARE OF THINGS AT HOME, OR GET ALONG WITH OTHER PEOPLE: NOT DIFFICULT AT ALL

## 2023-06-22 ASSESSMENT — PAIN SCALES - GENERAL: PAINLEVEL: MODERATE PAIN (5)

## 2023-06-22 NOTE — PROGRESS NOTES
"  Assessment & Plan     (M79.89) Leg swelling  (primary encounter diagnosis)  Comment: Ultrasound showed a superficial occlusive thrombosis   Plan: US Lower Extremity Venous Duplex Right      (I82.811) Leg vein thromboembolism, superficial, right  Comment: given the extent and the amount of pain patient was in, recommended an anticoagulant for a short period, I also recommend that she repeat ultrasound in about a month to recheck  Plan: apixaban ANTICOAGULANT (ELIQUIS) 5 MG tablet            (Z12.11) Screen for colon cancer  Comment: Patient reminded of colonoscopy  Plan: Colonoscopy Screening  Referral.      (Z12.31) Visit for screening mammogram  Comment: Patient reminded of mammogram.  Plan: MA SCREENING DIGITAL BILAT - Future  (s+30)             BMI:   Estimated body mass index is 27.33 kg/m  as calculated from the following:    Height as of this encounter: 1.645 m (5' 4.75\").    Weight as of this encounter: 73.9 kg (163 lb).       FUTURE APPOINTMENTS:       - Follow-up visit in one month or sooner as needed.    Vera Foley MD  [facility]    Christina Leblanc is a 58 year old, presenting for the following health issues:    Patient is a 58 yr old female here for right lower leg pain and calf tenderness. Her symptoms started a few weeks ago. Patient says she did not have any trauma to her leg. She says it is painful and throbbing. No recent long travels, no recent surgeries. No warmth feeling in her leg and no redness.       Leg Pain (Pain and swelling for the right lower leg.  Pain in the calf, feels tights and warm. Started about two weeks ago.  No known injury.  Ibuprofen and Celebrex will help.), New medication for MD to add (Liothyronine 5 mcg daily.), and Health Maintenance (She states she had her physical last week in [place].  Can check her insurance about the coverage for the shingles vaccine if wanting.)        6/22/2023    11:02 AM   Additional Questions " "  Roomed by Tamra Atkinson CMA   Accompanied by -Serene.     Chief Complaint   Patient presents with     Leg Pain     Pain and swelling for the right lower leg.  Pain in the calf, feels tights and warm. Started about two weeks ago.  No known injury.  Ibuprofen and Celebrex will help.     New medication for MD to add     Liothyronine 5 mcg daily.     Health Maintenance     She states she had her physical last week in [place].  Can check her insurance about the coverage for the shingles vaccine if wanting.       History of Present Illness       Reason for visit:  Pain and calf swelling  Symptom onset:  1-2 weeks ago    She eats 2-3 servings of fruits and vegetables daily.She consumes 0 sweetened beverage(s) daily.She exercises with enough effort to increase her heart rate 20 to 29 minutes per day.  She exercises with enough effort to increase her heart rate 5 days per week. She is missing 1 dose(s) of medications per week.    Today's PHQ-9         PHQ-9 Total Score: 9    PHQ-9 Q9 Thoughts of better off dead/self-harm past 2 weeks :   Not at all    How difficult have these problems made it for you to do your work, take care of things at home, or get along with other people: Not difficult at all               Review of Systems   Constitutional: Negative.    HENT: Negative.    Eyes: Negative.    Respiratory: Negative.    Cardiovascular: Negative.    Gastrointestinal: Negative.    Endocrine: Negative.    Breasts:  negative.    Genitourinary: Negative.    Musculoskeletal: Positive for arthralgias.   Allergic/Immunologic: Negative.    Neurological: Negative.    Psychiatric/Behavioral: Negative.             Objective    /84 (BP Location: Left arm, Patient Position: Chair, Cuff Size: Adult Regular)   Pulse 77   Temp 97.4  F (36.3  C) (Tympanic)   Resp 18   Ht 1.645 m (5' 4.75\")   Wt 73.9 kg (163 lb)   LMP 02/20/2015 (Exact Date)   SpO2 95%   BMI 27.33 kg/m    Body mass index is 27.33 kg/m .  Physical " Exam  Constitutional:       Appearance: Normal appearance.   HENT:      Head: Normocephalic and atraumatic.      Mouth/Throat:      Mouth: Mucous membranes are moist.   Eyes:      Pupils: Pupils are equal, round, and reactive to light.   Cardiovascular:      Rate and Rhythm: Normal rate and regular rhythm.      Pulses: Normal pulses.      Heart sounds: Normal heart sounds.   Pulmonary:      Effort: Pulmonary effort is normal. No respiratory distress.      Breath sounds: Normal breath sounds. No stridor. No wheezing, rhonchi or rales.   Chest:      Chest wall: No tenderness.   Musculoskeletal:         General: Swelling and tenderness present.      Right lower leg: Tenderness present. Edema present.        Legs:       Comments: Tenderness and swelling on posterior calf.    Neurological:      Mental Status: She is alert and oriented to person, place, and time.   Psychiatric:         Mood and Affect: Mood normal.         Behavior: Behavior normal.

## 2023-06-27 ENCOUNTER — OFFICE VISIT (OUTPATIENT)
Dept: FAMILY MEDICINE | Facility: CLINIC | Age: 58
End: 2023-06-27
Payer: COMMERCIAL

## 2023-06-27 VITALS
BODY MASS INDEX: 27.49 KG/M2 | RESPIRATION RATE: 16 BRPM | OXYGEN SATURATION: 96 % | SYSTOLIC BLOOD PRESSURE: 122 MMHG | WEIGHT: 165 LBS | DIASTOLIC BLOOD PRESSURE: 84 MMHG | HEIGHT: 65 IN | HEART RATE: 76 BPM | TEMPERATURE: 96.9 F

## 2023-06-27 DIAGNOSIS — H10.023 PINK EYE DISEASE OF BOTH EYES: Primary | ICD-10-CM

## 2023-06-27 PROCEDURE — 99213 OFFICE O/P EST LOW 20 MIN: CPT | Performed by: NURSE PRACTITIONER

## 2023-06-27 RX ORDER — POLYMYXIN B SULFATE AND TRIMETHOPRIM 1; 10000 MG/ML; [USP'U]/ML
1-2 SOLUTION OPHTHALMIC EVERY 4 HOURS
Qty: 10 ML | Refills: 0 | Status: SHIPPED | OUTPATIENT
Start: 2023-06-27 | End: 2023-07-04

## 2023-06-27 ASSESSMENT — PAIN SCALES - GENERAL: PAINLEVEL: MODERATE PAIN (5)

## 2023-06-27 NOTE — PROGRESS NOTES
"  Assessment & Plan     Pink eye disease of both eyes  - trimethoprim-polymyxin b (POLYTRIM) 67526-1.1 UNIT/ML-% ophthalmic solution; Place 1-2 drops into both eyes every 4 hours for 7 days    If no improvement with eye drops, follow up with your eye doctor.      The risks, benefits and treatment options of prescribed medications or other treatments have been discussed with the patient. The patient verbalized their understanding and should call or follow up if no improvement or if they develop further problems.    RADHA Mccullough CNP  [facility]                Christina Leblanc is a 58 year old, presenting for the following health issues:  Eye Problem        6/27/2023     6:53 AM   Additional Questions   Roomed by Zeny NICHOLS   Accompanied by self         6/27/2023     6:53 AM   Patient Reported Additional Medications   Patient reports taking the following new medications no new meds     HPI     Eye(s) Problem  Onset/Duration: Since last Thursday   Description:   Location: bilateral   Pain: No - more of an irritation  Redness: YES  Accompanying Signs & Symptoms:  Discharge/mattering: YES  Swelling: YES  Visual changes: YES- close up   Fever: No  Nasal Congestion: No  Bothered by bright lights: YES, not more the usual   History:  Trauma: No  Foreign body exposure: No  Wearing contacts: No  Precipitating or alleviating factors: None  Therapies tried and outcome: erythromycin ophthalmic ointment USP 0.5%          Review of Systems   Constitutional, HEENT, cardiovascular, pulmonary, gi and gu systems are negative, except as otherwise noted.      Objective    /84   Pulse 76   Temp 96.9  F (36.1  C) (Tympanic)   Resp 16   Ht 1.645 m (5' 4.75\")   Wt 74.8 kg (165 lb)   LMP 02/20/2015 (Exact Date)   SpO2 96%   BMI 27.67 kg/m    Body mass index is 27.67 kg/m .  Physical Exam   GENERAL: healthy, alert and no distress  EYES: PERRL, EOMI and conjunctiva/corneas- conjunctival injection " bilateral

## 2023-06-29 ENCOUNTER — TELEPHONE (OUTPATIENT)
Dept: FAMILY MEDICINE | Facility: CLINIC | Age: 58
End: 2023-06-29
Payer: COMMERCIAL

## 2023-06-29 RX ORDER — LEVOTHYROXINE SODIUM 112 UG/1
112 TABLET ORAL DAILY
Start: 2023-06-29 | End: 2023-08-29 | Stop reason: ALTCHOICE

## 2023-06-29 NOTE — TELEPHONE ENCOUNTER
Vaishali called stating she has had a reoccurrence of shakiness since starting the cytomel 5mg.  I advised her to stop taking it and to increase the levothyroxine 112mcg from 7 pills/week to 7.5 pills/week.  She'll come in med July for a TSH.

## 2023-07-06 ENCOUNTER — TELEPHONE (OUTPATIENT)
Dept: FAMILY MEDICINE | Facility: CLINIC | Age: 58
End: 2023-07-06
Payer: COMMERCIAL

## 2023-07-06 NOTE — TELEPHONE ENCOUNTER
Reason for Call:  Appointment Request    Patient requesting this type of appt: Chronic Diease Management/Medication/Follow-Up    Requested provider: Emma Beltran    Reason patient unable to be scheduled: Not within requested timeframe    When does patient want to be seen/preferred time: 3-7 days    Comments: Patient is in need of a follow-up apt from an ultrasound and would be due to be seen around 07-20-23.  Patient will be leaving for out of town on 07-21-23  Patient willing to see any provider at this location.      Could we send this information to you in [place] or would you prefer to receive a phone call?:   Patient would prefer a phone call   Okay to leave a detailed message?: Yes at Cell number on file:    Telephone Information:   Mobile [phone]       Call taken on 7/6/2023 at 9:53 AM by Staci Zazueta

## 2023-07-18 ENCOUNTER — OFFICE VISIT (OUTPATIENT)
Dept: FAMILY MEDICINE | Facility: CLINIC | Age: 58
End: 2023-07-18
Payer: COMMERCIAL

## 2023-07-18 VITALS
OXYGEN SATURATION: 98 % | HEART RATE: 78 BPM | BODY MASS INDEX: 27.06 KG/M2 | DIASTOLIC BLOOD PRESSURE: 60 MMHG | WEIGHT: 162.4 LBS | SYSTOLIC BLOOD PRESSURE: 100 MMHG | HEIGHT: 65 IN | RESPIRATION RATE: 20 BRPM | TEMPERATURE: 97.7 F

## 2023-07-18 DIAGNOSIS — Z12.11 SCREEN FOR COLON CANCER: ICD-10-CM

## 2023-07-18 DIAGNOSIS — Z12.31 VISIT FOR SCREENING MAMMOGRAM: ICD-10-CM

## 2023-07-18 DIAGNOSIS — Z23 NEED FOR VACCINATION: ICD-10-CM

## 2023-07-18 DIAGNOSIS — I80.01 SUPERFICIAL PHLEBITIS AND THROMBOPHLEBITIS OF RIGHT LOWER EXTREMITY: Primary | ICD-10-CM

## 2023-07-18 PROCEDURE — 90750 HZV VACC RECOMBINANT IM: CPT | Performed by: FAMILY MEDICINE

## 2023-07-18 PROCEDURE — 99212 OFFICE O/P EST SF 10 MIN: CPT | Mod: 25 | Performed by: FAMILY MEDICINE

## 2023-07-18 PROCEDURE — 90471 IMMUNIZATION ADMIN: CPT | Performed by: FAMILY MEDICINE

## 2023-07-18 ASSESSMENT — PAIN SCALES - GENERAL: PAINLEVEL: NO PAIN (0)

## 2023-07-18 NOTE — PROGRESS NOTES
"  Assessment & Plan     (I80.01) Superficial phlebitis and thrombophlebitis of right lower extremity  (primary encounter diagnosis)  Comment: patient reports improvement in symptoms  Plan: as above    (Z12.11) Screen for colon cancer  Comment: FIT test given to patient  Plan: Colonoscopy Screening  Referral, Fecal        colorectal cancer screen (FIT)         (Z12.31) Visit for screening mammogram  Comment:mammogram  Plan: [place] Screen Bilateral w/Avila           (Z23) Need for vaccination  Comment:shingles vaccine   Plan: as above.       BMI:   Estimated body mass index is 27.23 kg/m  as calculated from the following:    Height as of this encounter: 1.645 m (5' 4.75\").    Weight as of this encounter: 73.7 kg (162 lb 6.4 oz).       FUTURE APPOINTMENTS:       - Follow-up visit in one month.    Vera Foley MD  [facility]    Christina Leblanc is a 58 year old, presenting for the following health issues:    Patient is a 58 yr old female here for follow up on an superficial blood clot that she had last month.s he was started on an anticoagulant and she says there is remarkable change in the swelling and she has not had any pain. She tolerated the medication.   She is reminded of pap smear ,mammogram and colon cancer screening.  Deep Vein Thrombosis (Patient is following up today for blood clot of right lower leg.  States she is feeling a lot better. )        7/18/2023    10:01 AM   Additional Questions   Roomed by Seema CABRERA     History of Present Illness       Reason for visit:  Blodd clot follow up    She eats 4 or more servings of fruits and vegetables daily.She consumes 1 sweetened beverage(s) daily.She exercises with enough effort to increase her heart rate 20 to 29 minutes per day.  She exercises with enough effort to increase her heart rate 4 days per week. She is missing 1 dose(s) of medications per week.  She is not taking prescribed medications regularly due to remembering " "to take.       Chief Complaint   Patient presents with    Deep Vein Thrombosis     Patient is following up today for blood clot of right lower leg.  States she is feeling a lot better.              Review of Systems   Constitutional, HEENT, cardiovascular, pulmonary, gi and gu systems are negative, except as otherwise noted.      Objective    /60   Pulse 78   Temp 97.7  F (36.5  C) (Tympanic)   Resp 20   Ht 1.645 m (5' 4.75\")   Wt 73.7 kg (162 lb 6.4 oz)   LMP 02/20/2015 (Exact Date)   SpO2 98%   BMI 27.23 kg/m    Body mass index is 27.23 kg/m .  Physical Exam   GENERAL: healthy, alert and no distress  NECK: no adenopathy, no asymmetry, masses, or scars and thyroid normal to palpation  RESP: lungs clear to auscultation - no rales, rhonchi or wheezes  CV: regular rate and rhythm, normal S1 S2, no S3 or S4, no murmur, click or rub, no peripheral edema and peripheral pulses strong  ABDOMEN: soft, nontender, no hepatosplenomegaly, no masses and bowel sounds normal  MS: no gross musculoskeletal defects noted, no edema    Office Visit on 04/26/2023   Component Date Value Ref Range Status    Creatinine 04/26/2023 0.64  0.52 - 1.04 mg/dL Final    GFR Estimate 04/26/2023 >90  >60 mL/min/1.73m2 Final    eGFR calculated using 2021 CKD-EPI equation.    CRP Inflammation 04/26/2023 <2.9  0.0 - 8.0 mg/L Final    Erythrocyte Sedimentation Rate 04/26/2023 6  0 - 30 mm/hr Final    Bilirubin Total 04/26/2023 0.7  0.2 - 1.3 mg/dL Final    Bilirubin Direct 04/26/2023 <0.1  0.0 - 0.2 mg/dL Final    Protein Total 04/26/2023 7.3  6.8 - 8.8 g/dL Final    Albumin 04/26/2023 3.6  3.4 - 5.0 g/dL Final    Alkaline Phosphatase 04/26/2023 111  40 - 150 U/L Final    AST 04/26/2023 16  0 - 45 U/L Final    ALT 04/26/2023 31  0 - 50 U/L Final    Hepatitis B Core Antibody Total 04/26/2023 Nonreactive  Nonreactive Final    Hepatitis C Antibody 04/26/2023 Nonreactive  Nonreactive Final    Hepatitis B Surface Antigen 04/26/2023 Nonreactive  " Nonreactive Final    WBC Count 04/26/2023 7.1  4.0 - 11.0 10e3/uL Final    RBC Count 04/26/2023 4.53  3.80 - 5.20 10e6/uL Final    Hemoglobin 04/26/2023 14.5  11.7 - 15.7 g/dL Final    Hematocrit 04/26/2023 43.7  35.0 - 47.0 % Final    MCV 04/26/2023 97  78 - 100 fL Final    MCH 04/26/2023 32.0  26.5 - 33.0 pg Final    MCHC 04/26/2023 33.2  31.5 - 36.5 g/dL Final    RDW 04/26/2023 13.6  10.0 - 15.0 % Final    Platelet Count 04/26/2023 193  150 - 450 10e3/uL Final    % Neutrophils 04/26/2023 42  % Final    % Lymphocytes 04/26/2023 49  % Final    % Monocytes 04/26/2023 8  % Final    % Eosinophils 04/26/2023 2  % Final    % Basophils 04/26/2023 0  % Final    Absolute Neutrophils 04/26/2023 3.0  1.6 - 8.3 10e3/uL Final    Absolute Lymphocytes 04/26/2023 3.5  0.8 - 5.3 10e3/uL Final    Absolute Monocytes 04/26/2023 0.5  0.0 - 1.3 10e3/uL Final    Absolute Eosinophils 04/26/2023 0.1  0.0 - 0.7 10e3/uL Final    Absolute Basophils 04/26/2023 0.0  0.0 - 0.2 10e3/uL Final    Quantiferon Nil Tube 04/26/2023 0.02  IU/mL Final    Quantiferon TB1 Tube 04/26/2023 0.48  IU/mL Final    Quantiferon TB2 Tube 04/26/2023 0.03   Final    Quantiferon Mitogen 04/26/2023 10.00  IU/mL Final    Quantiferon-TB Gold Plus 04/26/2023 Positive (A)  Negative Final    Interferon gamma response to M.tuberculosis antigens was detected,suggesting infection with M.tuberculosis. Positive results in patients at low risk for infection should be interpreted with caution and repeat testing on a new sample should be considered as recommended by the 2017 ATS/IDSA/CDC Clinical Prac  josephine Guidelines for Diagnosis of Tuberculosis in Adults and Children     TB1 Ag minus Nil Value 04/26/2023 0.46  IU/mL Final    TB2 Ag minus Nil Value 04/26/2023 0.01  IU/mL Final    Mitogen minus Nil Result 04/26/2023 9.98  IU/mL Final    Nil Result 04/26/2023 0.02  IU/mL Final

## 2023-07-18 NOTE — NURSING NOTE
Prior to immunization administration, verified patients identity using patient s name and date of birth. Please see Immunization Activity for additional information.     Screening Questionnaire for Adult Immunization    Are you sick today?   No   Do you have allergies to medications, food, a vaccine component or latex?   No   Have you ever had a serious reaction after receiving a vaccination?   No   Do you have a long-term health problem with heart, lung, kidney, or metabolic disease (e.g., diabetes), asthma, a blood disorder, no spleen, complement component deficiency, a cochlear implant, or a spinal fluid leak?  Are you on long-term aspirin therapy?   Yes   Do you have cancer, leukemia, HIV/AIDS, or any other immune system problem?   No   Do you have a parent, brother, or sister with an immune system problem?   Yes   In the past 3 months, have you taken medications that affect  your immune system, such as prednisone, other steroids, or anticancer drugs; drugs for the treatment of rheumatoid arthritis, Crohn s disease, or psoriasis; or have you had radiation treatments?   No   Have you had a seizure, or a brain or other nervous system problem?   No   During the past year, have you received a transfusion of blood or blood    products, or been given immune (gamma) globulin or antiviral drug?   No   For women: Are you pregnant or is there a chance you could become       pregnant during the next month?   No   Have you received any vaccinations in the past 4 weeks?   No     Immunization questionnaire was positive for at least one answer.  Notified Dr. Foley.      Patient instructed to remain in clinic for 15 minutes afterwards, and to report any adverse reactions.     Screening performed by Vy Palmer CMA on 7/18/2023 at 10:33 AM.

## 2023-07-25 ENCOUNTER — MYC MEDICAL ADVICE (OUTPATIENT)
Dept: FAMILY MEDICINE | Facility: CLINIC | Age: 58
End: 2023-07-25
Payer: COMMERCIAL

## 2023-07-25 DIAGNOSIS — G47.00 INSOMNIA, UNSPECIFIED TYPE: ICD-10-CM

## 2023-07-25 DIAGNOSIS — G47.00 INSOMNIA, UNSPECIFIED TYPE: Primary | ICD-10-CM

## 2023-07-25 RX ORDER — HYDROXYZINE PAMOATE 25 MG/1
CAPSULE ORAL
Qty: 90 CAPSULE | Refills: 0 | OUTPATIENT
Start: 2023-07-25

## 2023-07-25 NOTE — TELEPHONE ENCOUNTER
Emma,    Please see my chart message for prescription that is not on active medication list. Elina ZARAGOZA RN

## 2023-07-26 RX ORDER — HYDROXYZINE HYDROCHLORIDE 25 MG/1
25-50 TABLET, FILM COATED ORAL DAILY PRN
Qty: 90 TABLET | Refills: 3 | Status: SHIPPED | OUTPATIENT
Start: 2023-07-26 | End: 2023-07-27

## 2023-07-27 RX ORDER — HYDROXYZINE HYDROCHLORIDE 25 MG/1
25-50 TABLET, FILM COATED ORAL DAILY PRN
Qty: 90 TABLET | Refills: 3 | Status: SHIPPED | OUTPATIENT
Start: 2023-07-27 | End: 2024-05-31

## 2023-08-17 ENCOUNTER — TELEPHONE (OUTPATIENT)
Dept: FAMILY MEDICINE | Facility: CLINIC | Age: 58
End: 2023-08-17
Payer: COMMERCIAL

## 2023-08-18 ENCOUNTER — LAB (OUTPATIENT)
Dept: LAB | Facility: CLINIC | Age: 58
End: 2023-08-18
Payer: COMMERCIAL

## 2023-08-18 DIAGNOSIS — E03.9 HYPOTHYROIDISM: ICD-10-CM

## 2023-08-18 DIAGNOSIS — E89.2 POSTABLATIVE HYPOPARATHYROIDISM (H): Primary | ICD-10-CM

## 2023-08-18 DIAGNOSIS — Z11.4 SCREENING FOR HIV (HUMAN IMMUNODEFICIENCY VIRUS): ICD-10-CM

## 2023-08-18 DIAGNOSIS — E89.0 POSTABLATIVE HYPOTHYROIDISM: Primary | ICD-10-CM

## 2023-08-18 DIAGNOSIS — Z11.4 SCREENING FOR HIV (HUMAN IMMUNODEFICIENCY VIRUS): Primary | ICD-10-CM

## 2023-08-18 DIAGNOSIS — E89.2 POSTABLATIVE HYPOPARATHYROIDISM (H): ICD-10-CM

## 2023-08-18 LAB
Lab: NORMAL
PERFORMING LABORATORY: NORMAL
T4 FREE SERPL-MCNC: 1.26 NG/DL (ref 0.9–1.7)
TEST NAME: NORMAL
TSH SERPL DL<=0.005 MIU/L-ACNC: 6.74 UIU/ML (ref 0.3–4.2)

## 2023-08-18 PROCEDURE — 84443 ASSAY THYROID STIM HORMONE: CPT | Mod: 90

## 2023-08-18 PROCEDURE — 84443 ASSAY THYROID STIM HORMONE: CPT

## 2023-08-18 PROCEDURE — 99000 SPECIMEN HANDLING OFFICE-LAB: CPT

## 2023-08-18 PROCEDURE — 36415 COLL VENOUS BLD VENIPUNCTURE: CPT

## 2023-08-18 PROCEDURE — 84439 ASSAY OF FREE THYROXINE: CPT

## 2023-08-18 PROCEDURE — 87389 HIV-1 AG W/HIV-1&-2 AB AG IA: CPT

## 2023-08-19 LAB
HIV 1+2 AB+HIV1 P24 AG SERPL QL IA: NONREACTIVE
MISCELLANEOUS TEST 1 (ARUP): ABNORMAL

## 2023-08-21 DIAGNOSIS — E03.9 HYPOTHYROIDISM, UNSPECIFIED TYPE: Primary | ICD-10-CM

## 2023-08-21 NOTE — RESULT ENCOUNTER NOTE
Please inform patient that test result was within normal parameters except the TSH was slightly high. Recommend recheck in two months.   Thank you.     Vera Foley M.D.

## 2023-08-25 ENCOUNTER — TRANSFERRED RECORDS (OUTPATIENT)
Dept: HEALTH INFORMATION MANAGEMENT | Facility: CLINIC | Age: 58
End: 2023-08-25
Payer: COMMERCIAL

## 2023-08-29 DIAGNOSIS — E89.0 POSTABLATIVE HYPOTHYROIDISM: Primary | ICD-10-CM

## 2023-08-29 RX ORDER — LEVOTHYROXINE SODIUM 125 UG/1
125 TABLET ORAL DAILY
Qty: 90 TABLET | Refills: 0 | Status: SHIPPED | OUTPATIENT
Start: 2023-08-29 | End: 2024-01-09 | Stop reason: DRUGHIGH

## 2023-09-29 ENCOUNTER — TELEPHONE (OUTPATIENT)
Dept: FAMILY MEDICINE | Facility: CLINIC | Age: 58
End: 2023-09-29
Payer: COMMERCIAL

## 2023-09-29 NOTE — TELEPHONE ENCOUNTER
Medication Question or Refill    [facility] called and stated that in order to refill this medication for pt they are in need of a supervising provider of Emma's NPI number along with title. Please call pharmacy with information    [facility]  Phone [phone]  Fax [phone]    What medication are you calling about (include dose and sig)?: hydrOXYzine (ATARAX) 25 MG tablet         Controlled Substance Agreement on file:   CSA -- Patient Level:    CSA: None found at the patient level.       Who prescribed the medication?: Emma

## 2023-10-06 ENCOUNTER — TELEPHONE (OUTPATIENT)
Dept: RHEUMATOLOGY | Facility: CLINIC | Age: 58
End: 2023-10-06
Payer: COMMERCIAL

## 2023-10-06 NOTE — TELEPHONE ENCOUNTER
PA Initiation    Medication: HUMIRA *CF* PEN 40 MG/0.4ML SC PNKT  Insurance Company: [facility] - Phone [phone] Fax [phone]  Pharmacy Filling the Rx:    Filling Pharmacy Phone:    Filling Pharmacy Fax:    Start Date: 10/6/2023    Key: [id number]

## 2023-10-09 NOTE — TELEPHONE ENCOUNTER
Prior Authorization Not Needed per Insurance    Medication: HUMIRA *CF* PEN 40 MG/0.4ML SC PNKT  Insurance Company: [facility] - Phone [phone] Fax [phone]  Expected CoPay: $    Pharmacy Filling the Rx: [facility] ([facility]) - [address] [[id number]]     Pharmacy Notified: Yes  Patient Notified: Yes

## 2023-10-31 ENCOUNTER — LAB (OUTPATIENT)
Dept: LAB | Facility: CLINIC | Age: 58
End: 2023-10-31
Payer: COMMERCIAL

## 2023-10-31 DIAGNOSIS — Z79.899 HIGH RISK MEDICATION USE: ICD-10-CM

## 2023-10-31 DIAGNOSIS — M06.09 RHEUMATOID ARTHRITIS OF MULTIPLE SITES WITH NEGATIVE RHEUMATOID FACTOR (H): ICD-10-CM

## 2023-10-31 LAB
ALBUMIN SERPL BCG-MCNC: 4.2 G/DL (ref 3.5–5.2)
ALP SERPL-CCNC: 94 U/L (ref 35–104)
ALT SERPL W P-5'-P-CCNC: 25 U/L (ref 0–50)
AST SERPL W P-5'-P-CCNC: 22 U/L (ref 0–45)
BASOPHILS # BLD AUTO: 0 10E3/UL (ref 0–0.2)
BASOPHILS NFR BLD AUTO: 1 %
BILIRUB DIRECT SERPL-MCNC: <0.2 MG/DL (ref 0–0.3)
BILIRUB SERPL-MCNC: 0.7 MG/DL
CREAT SERPL-MCNC: 0.87 MG/DL (ref 0.51–0.95)
CRP SERPL-MCNC: <3 MG/L
EGFRCR SERPLBLD CKD-EPI 2021: 76 ML/MIN/1.73M2
EOSINOPHIL # BLD AUTO: 0.1 10E3/UL (ref 0–0.7)
EOSINOPHIL NFR BLD AUTO: 2 %
ERYTHROCYTE [DISTWIDTH] IN BLOOD BY AUTOMATED COUNT: 13.5 % (ref 10–15)
ERYTHROCYTE [SEDIMENTATION RATE] IN BLOOD BY WESTERGREN METHOD: 8 MM/HR (ref 0–30)
HCT VFR BLD AUTO: 44.7 % (ref 35–47)
HGB BLD-MCNC: 14.7 G/DL (ref 11.7–15.7)
IMM GRANULOCYTES # BLD: 0 10E3/UL
IMM GRANULOCYTES NFR BLD: 0 %
LYMPHOCYTES # BLD AUTO: 3.3 10E3/UL (ref 0.8–5.3)
LYMPHOCYTES NFR BLD AUTO: 46 %
MCH RBC QN AUTO: 33.3 PG (ref 26.5–33)
MCHC RBC AUTO-ENTMCNC: 32.9 G/DL (ref 31.5–36.5)
MCV RBC AUTO: 101 FL (ref 78–100)
MONOCYTES # BLD AUTO: 0.4 10E3/UL (ref 0–1.3)
MONOCYTES NFR BLD AUTO: 6 %
NEUTROPHILS # BLD AUTO: 3.4 10E3/UL (ref 1.6–8.3)
NEUTROPHILS NFR BLD AUTO: 46 %
PLATELET # BLD AUTO: 192 10E3/UL (ref 150–450)
PROT SERPL-MCNC: 7.2 G/DL (ref 6.4–8.3)
RBC # BLD AUTO: 4.42 10E6/UL (ref 3.8–5.2)
WBC # BLD AUTO: 7.3 10E3/UL (ref 4–11)

## 2023-10-31 PROCEDURE — 85652 RBC SED RATE AUTOMATED: CPT

## 2023-10-31 PROCEDURE — 86140 C-REACTIVE PROTEIN: CPT

## 2023-10-31 PROCEDURE — 82565 ASSAY OF CREATININE: CPT

## 2023-10-31 PROCEDURE — 85025 COMPLETE CBC W/AUTO DIFF WBC: CPT

## 2023-10-31 PROCEDURE — 80076 HEPATIC FUNCTION PANEL: CPT

## 2023-10-31 PROCEDURE — 36415 COLL VENOUS BLD VENIPUNCTURE: CPT

## 2023-11-01 ENCOUNTER — OFFICE VISIT (OUTPATIENT)
Dept: RHEUMATOLOGY | Facility: CLINIC | Age: 58
End: 2023-11-01
Payer: COMMERCIAL

## 2023-11-01 VITALS
HEART RATE: 53 BPM | DIASTOLIC BLOOD PRESSURE: 64 MMHG | OXYGEN SATURATION: 98 % | WEIGHT: 165.6 LBS | TEMPERATURE: 98 F | SYSTOLIC BLOOD PRESSURE: 118 MMHG | BODY MASS INDEX: 27.77 KG/M2 | RESPIRATION RATE: 20 BRPM

## 2023-11-01 DIAGNOSIS — H57.89 EYE IRRITATION: ICD-10-CM

## 2023-11-01 DIAGNOSIS — Z79.899 HIGH RISK MEDICATION USE: ICD-10-CM

## 2023-11-01 DIAGNOSIS — H04.123 DRY EYES: ICD-10-CM

## 2023-11-01 DIAGNOSIS — Z23 NEED FOR VACCINATION: ICD-10-CM

## 2023-11-01 DIAGNOSIS — M06.09 RHEUMATOID ARTHRITIS OF MULTIPLE SITES WITH NEGATIVE RHEUMATOID FACTOR (H): Primary | ICD-10-CM

## 2023-11-01 DIAGNOSIS — H57.89 REDNESS OF BOTH EYES: ICD-10-CM

## 2023-11-01 PROCEDURE — 99214 OFFICE O/P EST MOD 30 MIN: CPT | Mod: 25 | Performed by: INTERNAL MEDICINE

## 2023-11-01 PROCEDURE — 90471 IMMUNIZATION ADMIN: CPT | Performed by: INTERNAL MEDICINE

## 2023-11-01 PROCEDURE — 90750 HZV VACC RECOMBINANT IM: CPT | Performed by: INTERNAL MEDICINE

## 2023-11-01 RX ORDER — METHOTREXATE 2.5 MG/1
20 TABLET ORAL WEEKLY
Qty: 104 TABLET | Refills: 2 | Status: SHIPPED | OUTPATIENT
Start: 2023-11-01 | End: 2024-06-19

## 2023-11-01 RX ORDER — FOLIC ACID 1 MG/1
1 TABLET ORAL DAILY
Qty: 100 TABLET | Refills: 2 | Status: SHIPPED | OUTPATIENT
Start: 2023-11-01

## 2023-11-01 NOTE — PROGRESS NOTES
Rheumatology Clinic Visit      Tequila Neumann MRN# [id number]   YOB: 1965 Age: 58 year old      Date of visit: 11/01/23   PCP: Emma Beltran    Chief Complaint   Patient presents with:  Rheumatoid Arthritis: Feeling good. Has had a few eye issues, not doing humira right now, will explain    Assessment and Plan     1.  Seronegative rheumatoid arthritis: Reportedly diagnosed in 2010.  Previously followed by DrsJenni Leon, Devon Zaman, William Vaca and Myrna Schrader.  Establish care with me on 4/26/2023, at which time doing well on combination of methotrexate and Humira.  Previously on azathioprine, hydroxychloroquine, Orencia ([id number]; stopped at one point when doing well and then didn't work when it was restarted).  Reportedly initial presentation of tendinitis of the wrist.  Sulfa allergy.  Currently on methotrexate 20 mg oral once weekly; patient stopped Humira approximately 2 months ago because of eye issues; see below regarding eye pain/redness.  Arthritis is well controlled at this time.  Continue methotrexate monotherapy.   - Continue methotrexate 20 mg once weekly  - Continue folic acid 1 mg daily  - Labs in every 6-12 weeks: CBC, Creatinine, Hepatic Panel, ESR, CRP (frequency is to accommodate frequent travels by patient)    High risk medication requiring intensive toxicity monitoring at least quarterly.    2. Bilateral trochanteric bursitis    3.  Bilateral eye pain/redness/dryness: Reportedly diagnosed as conjunctivitis by ophthalmology, treated with antibiotics with improvement, but then worsening of eye dryness and redness now.  No blurry vision at this time.  Question uveitis, dryness, or other.  Advised follow-up with ophthalmology and she is in agreement.  - Ophthalmology referral    4.  Vaccinations: Vaccinations reviewed with Ms. Neumann.    - Influenza: encouraged yearly vaccination  - [id number]: up to date  - [id number]: up to date  - COVID-19: 2nd dose to be received  today    Total minutes spent in evaluation with patient, documentation, , and review of pertinent studies and chart notes: 24    Ms. Neumann verbalized agreement with and understanding of the rational for the diagnosis and treatment plan.  All questions were answered to best of my ability and the patient's satisfaction. Ms. Neumann was advised to contact the clinic with any questions that may arise after the clinic visit.      Thank you for involving me in the care of the patient    Return to clinic: 3 months      HPI   Tequila Neumann is a 58 year old female with a past medical history significant for migraines, hypothyroidism, insomnia, tremor, anxiety, hyperlipidemia, nutcracker esophagus history, and rheumatoid arthritis who presents for follow-up of rheumatoid arthritis.    4/26/2023:  Previously followed by DrsJenni Leon, Devon Zaman, William Vaca and Myrna Schrader.  Most recently was followed at the [facility].  Transferring care because of location; she lives in [place] and [place] and comes to [place] where her family lives.  Previously was on azathioprine and hydroxychloroquine but she does not recall using these.  At one point was thought to have an allergy to methotrexate because of associated hives but is on it now and is doing well.  Previously was on  and Orencia and was doing well but at one point her rheumatologist felt that she may have fibromyalgia not rheumatoid arthritis Orencia was stopped and her arthritis flared, primarily in her knees.  Orencia was restarted at that time but not effective.  Therefore, changed to Humira in 2020 and has been doing well since then.  Also taking methotrexate 20 mg once weekly.  Has pain in her left shoulder since doing planks about 3 months ago, and it is improving with time.  Shoulder pain is worse with activity and improves with rest.  Also pain at the bilateral MTPs, occurs and resolves spontaneously.  History of cervical dystonia.   Bilateral hips hurt on the lateral aspect that is reproduced with self-evident palpation.    Today, 11/1/2023: Stopped Humira approximately 2 months ago.  She has stopped Humira because of bacterial conjunctivitis diagnosed by ophthalmology.  She said ophthalmology previously diagnosed with dry eyes but then later diagnosed with a bacterial conjunctivitis that was treated with antibiotic eyedrops and then as soon as she restarted Humira her eye symptoms worsen so she stopped Humira again.  Still with injected, dry, mildly painful eyes but no blurry vision.  She says she is going to see ophthalmology again.    Denies fevers, chills, nausea, vomiting, constipation, diarrhea. No abdominal pain. No chest pain/pressure, palpitations, or shortness of breath. No LE swelling. No neck pain currently. No oral or nasal sores.  No rash.     Tobacco: Former cigarette smoker  EtOH: 3-4 drinks per week  Drugs: None  Occupation: Supply chain management at , now retired.  Now living on [facility], and visits [place] where family lives    ROS   12 point review of system was completed and negative except as noted in the HPI     Active Problem List     Patient Active Problem List   Diagnosis    ESSENTIAL TREMOR     Migraine    Hypothyroidism    Tobacco use disorder    RA (rheumatoid arthritis) (H)    CARDIOVASCULAR SCREENING; LDL GOAL LESS THAN 160    Hx of LASIK    Nutcracker esophagus    Insomnia    Cervical dystonia    Symptomatic menopausal or female climacteric states    ASCUS with positive high risk HPV cervical    Myofascial muscle pain    Seronegative rheumatoid arthritis (H)    Torticollis, spasmodic    Avoids wheat/gluten and refined sugar and feeling better    EGD suggestive of achalasia    Familial dystonia    Tobacco dependence due to cigarettes    Personal history of other diseases of the digestive system    Fibromyalgia    Major depressive disorder, single episode, moderate (H)    Essential tremor     Anxiety    Hyperlipidemia LDL goal <100     Past Medical History     Past Medical History:   Diagnosis Date    Abnormal Pap smear of cervix 1985    s/p LEEP    Abnormal Pap smear of cervix 03/16/2018    See problem list    Avoids wheat/gluten and refined sugar and feeling better 1/10/2019    Calculus of GB w/ other cystitis 1/3/2012    Cervical high risk HPV (human papillomavirus) test positive 03/16/2018    See problem list    EGD suggestive of achalasia 1/10/2019    Familial dystonia 1/10/2019    Fibromyalgia      Past Surgical History     Past Surgical History:   Procedure Laterality Date    ARTHROSCOPY KNEE RT/LT Left 12/08/2005    Left medial meniscal tear bilat    COLONOSCOPY  7/11/2014    Procedure: COLONOSCOPY;  Surgeon: Randell Mcgarry MD;  Location: [facility]    D & C  x 2    ESOPHAGOSCOPY, GASTROSCOPY, DUODENOSCOPY (EGD), COMBINED  7/16/2012    Procedure: COMBINED ESOPHAGOSCOPY, GASTROSCOPY, DUODENOSCOPY (EGD);;  Surgeon: Lito Kwon MD;  Location: [facility]    HC ESOPH/GAS REFLUX TEST W NASAL IMPED ELECTRODE  8/23/2012    Procedure: ESOPHAGEAL IMPEDENCE FUNCTION TEST 1 HOUR OR LESS;  Surgeon: Tequila Boone MD;  Location: [facility]    LAPAROSCOPIC CHOLECYSTECTOMY  1/25/2012    Procedure:LAPAROSCOPIC CHOLECYSTECTOMY; Laparoscopic vs Open Cholecystectomy; Surgeon:RANDELL MCGARRY; Location:[facility]    LASIK Bilateral 2000    LEEP TX, CERVICAL  1985    TUBAL LIGATION  1998     Allergy     Allergies   Allergen Reactions    Primidone      Made her feel strange and out of it    Sulfa Antibiotics Nausea    Topamax      Personality change; memory issues    Topiramate Other (See Comments)     Personality change. Memory   Personality change; memory issues    Plaquenil [Hydroxychloroquine Sulfate] Rash     Current Medication List     Current Outpatient Medications   Medication Sig    adalimumab (HUMIRA *CF*) 40 MG/0.4ML pen kit Inject 0.4 mLs (40 mg) Subcutaneous every 14 days . Hold for  signs of infection, then seek medical attention.    cholecalciferol (VITAMIN D) 1000 UNIT tablet Take 1 tablet by mouth daily.    folic acid (FOLVITE) 1 MG tablet Take 1 tablet (1 mg) by mouth daily    hydrOXYzine (ATARAX) 25 MG tablet Take 1-2 tablets (25-50 mg) by mouth daily as needed for anxiety or other (insomnia)    levothyroxine (SYNTHROID/LEVOTHROID) 125 MCG tablet TAKE 1 TABLET (125 MCG) BY MOUTH DAILY    methocarbamol (ROBAXIN) 500 MG tablet 1-2 x 500mg tab by mouth nightly as needed    methotrexate sodium 2.5 MG TABS Take 8 tablets (20 mg) by mouth once a week . This is a once weekly medication, taken on the same day of each week.    propranolol (INDERAL) 60 MG tablet TAKE 1 TABLET BY MOUTH 2 TIMES DAILY    celecoxib (CELEBREX) 100 MG capsule Take 1 capsule (100 mg) by mouth daily NO FURTHER REFILLS UNTIL SEEN BY  PROVIDER.     No current facility-administered medications for this visit.     Social History   See HPI    Family History     Family History   Problem Relation Age of Onset    C.A.D. Father     Diabetes Father     Hypertension Father     Cerebrovascular Disease Father     Lipids Father     Breast Cancer Maternal Grandmother         age 80+    Alzheimer Disease Maternal Grandmother         age 70+    Cancer Mother         sarcoma, small intestinal    Neurologic Disorder Mother         tremor    Tremor Mother     Dystonia Mother     Colon Polyps Sister     Other - See Comments Sister         no clear stroke    Dystonia Daughter     Cancer - colorectal No family hx of     Prostate Cancer No family hx of      Physical Exam     Temp Readings from Last 3 Encounters:   11/01/23 98  F (36.7  C) (Oral)   07/18/23 97.7  F (36.5  C) (Tympanic)   06/27/23 96.9  F (36.1  C) (Tympanic)     BP Readings from Last 5 Encounters:   11/01/23 118/64   07/18/23 100/60   06/27/23 122/84   06/22/23 118/84   04/26/23 102/74     Pulse Readings from Last 1 Encounters:   11/01/23 53     Resp Readings from Last 1  "Encounters:   11/01/23 20     Estimated body mass index is 27.77 kg/m  as calculated from the following:    Height as of 7/18/23: 1.645 m (5' 4.75\").    Weight as of this encounter: 75.1 kg (165 lb 9.6 oz).    GEN: NAD. Healthy appearing adult.   CV: S1, S2.  RRR.  No murmurs or rubs.  PULM: No increased work of breathing.  CTA bilaterally  HEENT: Injected sclera bilaterally.  No obvious external lesions of the ears/nose.  Hearing intact.  PULM: No increased work of breathing.  MSK: MCPs, PIPs, DIPs without swelling or tenderness to palpation.  Wrists without swelling or tenderness to palpation.  Elbows and shoulders without swelling or tenderness to palpation.   Knees, ankles, and MTPs without swelling or tenderness to palpation.    SKIN: No rash or jaundice seen  PSYCH: Alert. Appropriate.     Labs / Imaging (select studies)     CBC  Recent Labs   Lab Test 10/31/23  1402 04/26/23  1226 11/22/22  1522 11/29/21  1546 05/21/21  0855 03/19/20  1454 01/10/20  1013   WBC 7.3 7.1 8.8   < > 6.6 7.8 8.6   RBC 4.42 4.53 4.14   < > 4.58 4.45 4.75   HGB 14.7 14.5 14.3   < > 14.9 14.2 15.0   HCT 44.7 43.7 42.1   < > 43.3 43.1 46.2   * 97 102*   < > 95 97 97   RDW 13.5 13.6 13.7   < > 13.4 14.7 12.7    193 179   < > 193 250 241   MCH 33.3* 32.0 34.5*   < > 32.5 31.9 31.6   MCHC 32.9 33.2 34.0   < > 34.4 32.9 32.5   NEUTROPHIL 46 42 42   < > 32.4 47.8 37.8   LYMPH 46 49 48   < > 55.9 41.1 51.2   MONOCYTE 6 8 9   < > 9.0 9.1 7.7   EOSINOPHIL 2 2 2   < > 2.0 1.7 2.2   BASOPHIL 1 0 0   < > 0.5 0.3 0.5   ANEU  --   --   --   --  2.1 3.8 3.2   ALYM  --   --   --   --  3.7 3.2 4.4   GAYATHRI  --   --   --   --  0.6 0.7 0.7   AEOS  --   --   --   --  0.1 0.1 0.2   ABAS  --   --   --   --  0.0 0.0 0.0   ANEUTAUTO 3.4 3.0 3.7   < >  --   --   --    ALYMPAUTO 3.3 3.5 4.2   < >  --   --   --    AMONOAUTO 0.4 0.5 0.8   < >  --   --   --    AEOSAUTO 0.1 0.1 0.1   < >  --   --   --    ABSBASO 0.0 0.0 0.0   < >  --   --   --     < > " = values in this interval not displayed.     CMP  Recent Labs   Lab Test 10/31/23  1402 04/26/23  1226 11/22/22  1522 06/23/22  0843 11/29/21  1546 05/21/21  0855 11/01/19  1029 09/17/18  0711   NA  --   --   --  141  --  136 136 138   POTASSIUM  --   --   --  4.0  --  4.0 3.8 3.8   CHLORIDE  --   --   --  108  --  103 103 105   CO2  --   --   --  27  --  29 30 29   ANIONGAP  --   --   --  6  --  4 3 4   GLC  --   --   --  88  --  89 87 88   BUN  --   --   --  12  --  13 13 16   CR 0.87 0.64 0.87 0.66   < > 0.72 0.64 0.80   GFRESTIMATED 76 >90 77 >90   < > >90 >90 74   GFRESTBLACK  --   --   --   --   --  >90 >90 >90   CELI  --   --   --  9.1  --  8.6 9.0 8.3*   BILITOTAL 0.7 0.7  --  1.3  --  1.0  --   --    ALBUMIN 4.2 3.6  --  4.1  --  3.8  --   --    PROTTOTAL 7.2 7.3  --  7.8  --  7.1  --   --    ALKPHOS 94 111  --  93  --  119  --   --    AST 22 16 24 15   < > 15  --   --    ALT 25 31  --  24  --  27  --   --     < > = values in this interval not displayed.     Calcium/VitaminD  Recent Labs   Lab Test 06/23/22  0843 05/21/21  0855 11/01/19  1029 09/17/18  0711 05/22/17  1713 08/01/16  1010   CELI 9.1 8.6 9.0   < >  --   --    VITDT  --   --  72  --  40 52    < > = values in this interval not displayed.     ESR/CRP  Recent Labs   Lab Test 10/31/23  1402 04/26/23  1226 05/21/21  0855 03/19/20  1454   SED 8 6 6 14   CRP  --  <2.9 <2.9 15.6*   CRPI <3.00  --   --   --      Hepatitis B  Recent Labs   Lab Test 04/26/23  1226   HBCAB Nonreactive   HEPBANG Nonreactive     Hepatitis C  Recent Labs   Lab Test 04/26/23  1226   HCVAB Nonreactive     Tuberculosis Screening  Recent Labs   Lab Test 04/26/23  1226   TBRES Positive*     HIV Screening  Recent Labs   Lab Test 08/18/23  1406   HIAGAB Nonreactive     -----------------------------------  5/10/2023 [facility] chest x-ray normal and 6/7/2023 QuantiFERON-TB gold plus negative      Immunization History     Immunization History   Administered Date(s) Administered     COVID-19 MONOVALENT 12+ (Pfizer) 11/26/2021    COVID-19 Monovalent 18+ (Moderna) 04/22/2021, 05/21/2021    Influenza (IIV3) PF 09/28/2010, 10/20/2011, 11/19/2012, 10/01/2018    Influenza Vaccine >6 months (Alfuria,Fluzone) 11/13/2019    Pneumo Conj 13-V (2010&after) 04/01/2019    Pneumococcal 23 valent 11/19/2012    TD,PF 7+ (Tenivac) 01/01/1984, 05/13/1997, 06/27/2003    TDAP (Adacel,Boostrix) 09/26/2021    TDAP Vaccine (Adacel) 07/28/2014    Td (Adult), Adsorbed 06/27/2000    Zoster recombinant adjuvanted (SHINGRIX) 07/18/2023          Chart documentation done in part with Dragon Voice recognition Software. Although reviewed after completion, some word and grammatical error may remain.    Nghia Patel MD

## 2023-11-01 NOTE — NURSING NOTE
RAPID3 (0-30) Cumulative Score  1.5          RAPID3 Weighted Score (divide #4 by 3 and that is the weighted score)  0.4

## 2023-11-01 NOTE — PATIENT INSTRUCTIONS
RHEUMATOLOGY    [facility] [place]  [address] [place] [address]  [place] [address]    Phone number: [phone]  Fax number: [phone]    If you need a medication refill, please contact us as you may need lab work and/or a follow up visit prior to your refill.      Thank you for choosing [facility]!    Lore Martini CMA Rheumatology

## 2023-11-04 ENCOUNTER — HEALTH MAINTENANCE LETTER (OUTPATIENT)
Age: 58
End: 2023-11-04

## 2023-11-09 ENCOUNTER — OFFICE VISIT (OUTPATIENT)
Dept: OPHTHALMOLOGY | Facility: CLINIC | Age: 58
End: 2023-11-09
Attending: INTERNAL MEDICINE
Payer: COMMERCIAL

## 2023-11-09 DIAGNOSIS — H57.89 EYE IRRITATION: ICD-10-CM

## 2023-11-09 DIAGNOSIS — H57.89 REDNESS OF BOTH EYES: ICD-10-CM

## 2023-11-09 DIAGNOSIS — H04.123 DRY EYES: Primary | ICD-10-CM

## 2023-11-09 PROCEDURE — 92002 INTRM OPH EXAM NEW PATIENT: CPT | Performed by: OPHTHALMOLOGY

## 2023-11-09 ASSESSMENT — SLIT LAMP EXAM - LIDS: COMMENTS: 1+ DERMATOCHALASIS, 1+ MEIBOMIAN GLAND DYSFUNCTION

## 2023-11-09 ASSESSMENT — VISUAL ACUITY
OD_SC: 20/50
OS_SC: 20/25
OD_PH_SC+: +2
METHOD: SNELLEN - LINEAR
OD_PH_SC: 20/30

## 2023-11-09 ASSESSMENT — TONOMETRY
IOP_METHOD: ICARE
OD_IOP_MMHG: 12
OS_IOP_MMHG: 13

## 2023-11-09 NOTE — PROGRESS NOTES
Current Eye Medications:  preservative free systane both eyes 3-4 times a day, which help symptoms.       Subjective:  Dr. Patel recommended an evaluation of recurrent irritation, mattering, and redness in both eyes.  The patient has been treated with Polytrim for intermittent bilateral conjunctivitis for the past year.  Polytrim drops help, but then the redness, mattering, and irritation returns following the Humira injection.  Last episode was in July.  Dr. Patel wanted Dr. Baker to rule out uveitis.      History of refractive surgery in both eyes in 1993.  RA; on Methotrexate, no Plaquenil.     Objective:  See Ophthalmology Exam.       Assessment:  No ocular inflammation or infection either eye on slit lamp exam in patient with RA and chronic redness and mattering.  Dry eyes.      Plan:  Continue to use preservative free artifical tears (Refresh Plus, Systane Complete) Avoid generic artifical tears.    Recommend using Celluvisc (thicker drops) throughout the day and Refresh PM Ointment before bed to help with dryness. These drops may make your vision blurry for 10-15 minutes after installation.     Return visit in July for a complete exam.   Call if problems worsen or fail to improve. We could try a prescription drop like Restasis or Xiidra; or steroid.    René Baker M.D.  [phone]

## 2023-11-09 NOTE — PATIENT INSTRUCTIONS
Continue to use preservative free artifical tears (Refresh Plus, Systane Complete) Avoid generic artifical tears.    Recommend using Celluvisc (thicker drops) throughout the day and Refresh PM Ointment before bed to help with dryness. These drops may make your vision blurry for 10-15 minutes after installation.     Return visit in July for a complete exam.   Call if problems worsen or fail to improve. We could try a prescription drop like Restasis or Xiidra; or steroid.    René Baker M.D.  [phone]

## 2023-11-09 NOTE — LETTER
11/9/2023         RE: Tequila Neumann  [address] [facility] [address]        Dear Colleague,    Thank you for referring your patient, Tequila Neumann, to the [facility]. Please see a copy of my visit note below.     Current Eye Medications:  preservative free systane both eyes 3-4 times a day, which help symptoms.       Subjective:  Dr. Patel recommended an evaluation of recurrent irritation, mattering, and redness in both eyes.  The patient has been treated with Polytrim for intermittent bilateral conjunctivitis for the past year.  Polytrim drops help, but then the redness, mattering, and irritation returns following the Humira injection.  Last episode was in July.  Dr. Patel wanted Dr. Baker to rule out uveitis.      History of refractive surgery in both eyes in 1993.  RA; on Methotrexate, no Plaquenil.     Objective:  See Ophthalmology Exam.       Assessment:  No ocular inflammation or infection either eye on slit lamp exam in patient with RA and chronic redness and mattering.  Dry eyes.      Plan:  Continue to use preservative free artifical tears (Refresh Plus, Systane Complete) Avoid generic artifical tears.    Recommend using Celluvisc (thicker drops) throughout the day and Refresh PM Ointment before bed to help with dryness. These drops may make your vision blurry for 10-15 minutes after installation.     Return visit in July for a complete exam.   Call if problems worsen or fail to improve. We could try a prescription drop like Restasis or Xiidra; or steroid.    René Baker M.D.  [phone]         Again, thank you for allowing me to participate in the care of your patient.        Sincerely,        René Baker MD

## 2023-11-11 PROBLEM — H04.123 DRY EYES: Status: ACTIVE | Noted: 2023-11-11

## 2023-12-15 ENCOUNTER — MYC MEDICAL ADVICE (OUTPATIENT)
Dept: RHEUMATOLOGY | Facility: CLINIC | Age: 58
End: 2023-12-15
Payer: COMMERCIAL

## 2023-12-15 DIAGNOSIS — M06.09 RHEUMATOID ARTHRITIS OF MULTIPLE SITES WITH NEGATIVE RHEUMATOID FACTOR (H): Primary | ICD-10-CM

## 2023-12-19 NOTE — TELEPHONE ENCOUNTER
Called patient and she did have flare. Per Dr. Jorge houser.     Joanna GASPAR RN, Specialty Clinic 12/19/23 3:55 PM

## 2023-12-19 NOTE — TELEPHONE ENCOUNTER
RN: Please call Deana Neumann.  Deana stopped Humira in approximately Sept 2023.  Did she have return of arthritis symptoms?  If so then it makes sense to restart Humira and a new prescription (Humira 40 mg SQ every 14 days; citrate free, autoinjector pen) can be sent in.  If she didn't have return of arthritis symptoms then I recommend staying off Humira.      Please keep me updated.     Nghia Patel MD  12/19/2023

## 2023-12-27 ENCOUNTER — TELEPHONE (OUTPATIENT)
Dept: RHEUMATOLOGY | Facility: CLINIC | Age: 58
End: 2023-12-27
Payer: COMMERCIAL

## 2023-12-27 DIAGNOSIS — M06.9 RHEUMATOID ARTHRITIS, INVOLVING UNSPECIFIED SITE, UNSPECIFIED WHETHER RHEUMATOID FACTOR PRESENT (H): Primary | ICD-10-CM

## 2023-12-27 DIAGNOSIS — M06.09 RHEUMATOID ARTHRITIS OF MULTIPLE SITES WITH NEGATIVE RHEUMATOID FACTOR (H): Primary | ICD-10-CM

## 2023-12-27 RX ORDER — ADALIMUMAB-BWWD 40 MG/.4ML
40 SOLUTION SUBCUTANEOUS
Qty: 0.8 ML | Refills: 5 | Status: SHIPPED | OUTPATIENT
Start: 2023-12-27 | End: 2023-12-28

## 2023-12-28 DIAGNOSIS — E89.0 POSTABLATIVE HYPOTHYROIDISM: ICD-10-CM

## 2023-12-28 RX ORDER — ADALIMUMAB-BWWD 40 MG/.4ML
40 SOLUTION SUBCUTANEOUS
Qty: 0.8 ML | Refills: 6 | Status: SHIPPED | OUTPATIENT
Start: 2023-12-28 | End: 2024-06-19

## 2023-12-28 NOTE — TELEPHONE ENCOUNTER
Marquita: please note that the patient prefers the AUTOINJECTOR.  I see that an order was placed by Marley Perdomo for the prefilled syringe (not autoinjector) yesterday. I placed a new order today. Please make sure the pharmacy knows to dispense the autoinjector only.     Thanks!  Nghia Patel MD  12/28/2023

## 2023-12-28 NOTE — TELEPHONE ENCOUNTER
No care due was identified.  [facility] Embedded Care Due Messages. Reference number: [id number]. 12/28/2023 8:57:55 AM MST

## 2023-12-28 NOTE — TELEPHONE ENCOUNTER
Per patient's insurance Humira bio similar hadlima is preferred product.    PA on file has no end date. Patient restricted to filling with [facility] or [facility].    Humira bio similar process initiated 12/27/2023.    Patient aware and signed up for Hadlima copay card.    Prior Authorization Not Needed per Insurance    Medication: HADLIMA PUSHTOUCH 40 MG/0.4ML SC SOAJ  Insurance Company: [facility] - Phone [phone] Fax [phone]  Expected CoPay: $ 0  Pharmacy Filling the Rx: [facility] - [address]  Pharmacy Notified: no - new order placed - please confirm preferred formulation  Patient Notified: patient aware bio similar is being prescribed.    Thank You,     Veronica Zaldivar [facility]  Specialty Pharmacy Clinic [facility] Specialty  [email or web address]  [email or web address]  Phone: [phone]  Fax: [phone]

## 2023-12-28 NOTE — TELEPHONE ENCOUNTER
Rheumatology Telephone Note:    I called and spoke with Ms. Neumann.  Discussed change to Hadlima, that is required by her insurance. Patient is okay with the change.     All questions were answered and she thanked me for the call.     Nghia Patel MD  12/28/2023 9:39 AM

## 2023-12-28 NOTE — TELEPHONE ENCOUNTER
Called [facility] pharmacy to clarify AUTO-INJECTOR.    Rx for pre-filled syringe has been cancelled and clarified with pharmacist preferred product is Hadlima Pushtouch which pharmacy has que'd for fill.    Thank You,     Marquita Zaldivar [facility]  Specialty Pharmacy Clinic [facility] Specialty  [email or web address]  [email or web address]  Phone: [phone]  Fax: [phone]

## 2024-01-02 RX ORDER — LEVOTHYROXINE SODIUM 125 UG/1
125 TABLET ORAL DAILY
OUTPATIENT
Start: 2024-01-02 | End: 2025-01-01

## 2024-01-03 DIAGNOSIS — E89.0 POSTABLATIVE HYPOTHYROIDISM: Primary | ICD-10-CM

## 2024-05-16 DIAGNOSIS — M06.09 RHEUMATOID ARTHRITIS OF MULTIPLE SITES WITH NEGATIVE RHEUMATOID FACTOR (H): ICD-10-CM

## 2024-05-16 NOTE — TELEPHONE ENCOUNTER
Pending Prescriptions:                       Disp   Refills    methotrexate 2.5 MG tablet                104 ta*2            Sig: Take 8 tablets (20 mg) by mouth once a week .           This is a once weekly medication, taken on the           same day of each week.      Last Written Prescription Date:  11/1/2023  Last Fill Quantity: 104,  # refills: 2   Last office visit: 11/1/2023  Future Office Visit:   Next 5 appointments (look out 90 days)      Jun 19, 2024 11:00 AM  (Arrive by 10:45 AM)  Return Visit with Nghia Patel MD  [facility] ([facility] ) [address] [place] [id number]  [phone]           Labs: 10/31/2023     MARY Casas, RN, PHN 5/16/2024 11:55 AM

## 2024-05-23 DIAGNOSIS — E89.0 POSTABLATIVE HYPOTHYROIDISM: ICD-10-CM

## 2024-05-23 RX ORDER — LEVOTHYROXINE SODIUM 137 UG/1
137 TABLET ORAL DAILY
Qty: 90 TABLET | Refills: 0 | OUTPATIENT
Start: 2024-05-23

## 2024-05-23 RX ORDER — METHOTREXATE 2.5 MG/1
20 TABLET ORAL WEEKLY
Qty: 104 TABLET | Refills: 2 | OUTPATIENT
Start: 2024-05-23

## 2024-05-23 RX ORDER — LEVOTHYROXINE SODIUM 137 UG/1
137 TABLET ORAL DAILY
Qty: 90 TABLET | Refills: 0 | Status: SHIPPED | OUTPATIENT
Start: 2024-05-23 | End: 2024-09-17 | Stop reason: SDUPTHER

## 2024-05-23 NOTE — TELEPHONE ENCOUNTER
Pt read initial mychart and responded, but has not read second message. Waiting for pt to reply about labs    MARY Figueroa RN 5/23/2024 3:41 PM

## 2024-05-23 NOTE — TELEPHONE ENCOUNTER
Care Due:                  Date            Visit Type   Department     Provider  --------------------------------------------------------------------------------                                           [facility]  Last Visit: 06-      [facility]       CRYSTAL  PAGE  Next Visit: None Scheduled  None         None Found                                                            Last  Test          Frequency    Reason                     Performed    Due Date  --------------------------------------------------------------------------------  Office Visit  12 months..  levothyroxine............  06- 05-    TSH.........  12 months..  levothyroxine............  Not Found    Overdue    [employer] Embedded Care Due Messages. Reference number: [id number]. 5/23/2024 5:05:51 AM LEANNA

## 2024-05-31 ENCOUNTER — OFFICE VISIT (OUTPATIENT)
Dept: FAMILY MEDICINE | Facility: CLINIC | Age: 59
End: 2024-05-31
Payer: COMMERCIAL

## 2024-05-31 VITALS
OXYGEN SATURATION: 95 % | HEART RATE: 62 BPM | BODY MASS INDEX: 27.6 KG/M2 | WEIGHT: 164.6 LBS | RESPIRATION RATE: 16 BRPM | SYSTOLIC BLOOD PRESSURE: 110 MMHG | TEMPERATURE: 98.6 F | DIASTOLIC BLOOD PRESSURE: 66 MMHG

## 2024-05-31 DIAGNOSIS — G25.0 ESSENTIAL TREMOR: ICD-10-CM

## 2024-05-31 DIAGNOSIS — Z00.00 ANNUAL PHYSICAL EXAM: Primary | ICD-10-CM

## 2024-05-31 DIAGNOSIS — E78.5 HYPERLIPIDEMIA LDL GOAL <100: ICD-10-CM

## 2024-05-31 DIAGNOSIS — F17.210 TOBACCO DEPENDENCE DUE TO CIGARETTES: ICD-10-CM

## 2024-05-31 DIAGNOSIS — M06.00 SERONEGATIVE RHEUMATOID ARTHRITIS (H): ICD-10-CM

## 2024-05-31 DIAGNOSIS — G24.3 CERVICAL DYSTONIA: ICD-10-CM

## 2024-05-31 DIAGNOSIS — M79.661 PAIN OF RIGHT LOWER LEG: ICD-10-CM

## 2024-05-31 DIAGNOSIS — E03.9 HYPOTHYROIDISM, UNSPECIFIED TYPE: ICD-10-CM

## 2024-05-31 DIAGNOSIS — Z12.4 CERVICAL CANCER SCREENING: ICD-10-CM

## 2024-05-31 DIAGNOSIS — G47.00 INSOMNIA, UNSPECIFIED TYPE: ICD-10-CM

## 2024-05-31 DIAGNOSIS — Z12.11 COLON CANCER SCREENING: ICD-10-CM

## 2024-05-31 LAB
ALBUMIN SERPL BCG-MCNC: 4.4 G/DL (ref 3.5–5.2)
ALP SERPL-CCNC: 105 U/L (ref 40–150)
ALT SERPL W P-5'-P-CCNC: 21 U/L (ref 0–50)
ANION GAP SERPL CALCULATED.3IONS-SCNC: 13 MMOL/L (ref 7–15)
AST SERPL W P-5'-P-CCNC: 31 U/L (ref 0–45)
BILIRUB SERPL-MCNC: 0.7 MG/DL
BUN SERPL-MCNC: 18.2 MG/DL (ref 8–23)
CALCIUM SERPL-MCNC: 9.6 MG/DL (ref 8.8–10.2)
CHLORIDE SERPL-SCNC: 104 MMOL/L (ref 98–107)
CHOLEST SERPL-MCNC: 261 MG/DL
CREAT SERPL-MCNC: 0.76 MG/DL (ref 0.51–0.95)
DEPRECATED HCO3 PLAS-SCNC: 24 MMOL/L (ref 22–29)
EGFRCR SERPLBLD CKD-EPI 2021: 89 ML/MIN/1.73M2
ERYTHROCYTE [DISTWIDTH] IN BLOOD BY AUTOMATED COUNT: 12.9 % (ref 10–15)
FASTING STATUS PATIENT QL REPORTED: YES
FASTING STATUS PATIENT QL REPORTED: YES
GLUCOSE SERPL-MCNC: 94 MG/DL (ref 70–99)
HCT VFR BLD AUTO: 44.9 % (ref 35–47)
HDLC SERPL-MCNC: 53 MG/DL
HGB BLD-MCNC: 14.8 G/DL (ref 11.7–15.7)
LDLC SERPL CALC-MCNC: 166 MG/DL
MCH RBC QN AUTO: 32.6 PG (ref 26.5–33)
MCHC RBC AUTO-ENTMCNC: 33 G/DL (ref 31.5–36.5)
MCV RBC AUTO: 99 FL (ref 78–100)
NONHDLC SERPL-MCNC: 208 MG/DL
PLATELET # BLD AUTO: 211 10E3/UL (ref 150–450)
POTASSIUM SERPL-SCNC: 4.5 MMOL/L (ref 3.4–5.3)
PROT SERPL-MCNC: 7.9 G/DL (ref 6.4–8.3)
RBC # BLD AUTO: 4.54 10E6/UL (ref 3.8–5.2)
SODIUM SERPL-SCNC: 141 MMOL/L (ref 135–145)
TRIGL SERPL-MCNC: 208 MG/DL
TSH SERPL DL<=0.005 MIU/L-ACNC: 1.29 UIU/ML (ref 0.3–4.2)
WBC # BLD AUTO: 8.5 10E3/UL (ref 4–11)

## 2024-05-31 PROCEDURE — 80053 COMPREHEN METABOLIC PANEL: CPT | Performed by: NURSE PRACTITIONER

## 2024-05-31 PROCEDURE — 84443 ASSAY THYROID STIM HORMONE: CPT | Performed by: NURSE PRACTITIONER

## 2024-05-31 PROCEDURE — 85027 COMPLETE CBC AUTOMATED: CPT | Performed by: NURSE PRACTITIONER

## 2024-05-31 PROCEDURE — 36415 COLL VENOUS BLD VENIPUNCTURE: CPT | Performed by: NURSE PRACTITIONER

## 2024-05-31 PROCEDURE — 80061 LIPID PANEL: CPT | Performed by: NURSE PRACTITIONER

## 2024-05-31 PROCEDURE — 99396 PREV VISIT EST AGE 40-64: CPT | Performed by: NURSE PRACTITIONER

## 2024-05-31 PROCEDURE — 87624 HPV HI-RISK TYP POOLED RSLT: CPT | Performed by: NURSE PRACTITIONER

## 2024-05-31 PROCEDURE — 99214 OFFICE O/P EST MOD 30 MIN: CPT | Mod: 25 | Performed by: NURSE PRACTITIONER

## 2024-05-31 PROCEDURE — G0145 SCR C/V CYTO,THINLAYER,RESCR: HCPCS | Performed by: NURSE PRACTITIONER

## 2024-05-31 RX ORDER — PROPRANOLOL HYDROCHLORIDE 60 MG/1
1 TABLET ORAL 2 TIMES DAILY
Qty: 180 TABLET | Refills: 3 | Status: SHIPPED | OUTPATIENT
Start: 2024-05-31 | End: 2024-09-25

## 2024-05-31 RX ORDER — METHOCARBAMOL 500 MG/1
TABLET, FILM COATED ORAL
Qty: 180 TABLET | Refills: 3 | Status: SHIPPED | OUTPATIENT
Start: 2024-05-31

## 2024-05-31 RX ORDER — HYDROXYZINE HYDROCHLORIDE 25 MG/1
25-50 TABLET, FILM COATED ORAL DAILY PRN
Qty: 90 TABLET | Refills: 3 | Status: SHIPPED | OUTPATIENT
Start: 2024-05-31 | End: 2024-10-07

## 2024-05-31 RX ORDER — CELECOXIB 100 MG/1
100 CAPSULE ORAL DAILY
Qty: 90 CAPSULE | Refills: 3 | Status: SHIPPED | OUTPATIENT
Start: 2024-05-31

## 2024-05-31 RX ORDER — LEVOTHYROXINE SODIUM 125 UG/1
125 TABLET ORAL DAILY
Qty: 90 TABLET | Refills: 3 | Status: SHIPPED | OUTPATIENT
Start: 2024-05-31 | End: 2024-08-01

## 2024-05-31 SDOH — HEALTH STABILITY: PHYSICAL HEALTH: ON AVERAGE, HOW MANY MINUTES DO YOU ENGAGE IN EXERCISE AT THIS LEVEL?: 40 MIN

## 2024-05-31 SDOH — HEALTH STABILITY: PHYSICAL HEALTH: ON AVERAGE, HOW MANY DAYS PER WEEK DO YOU ENGAGE IN MODERATE TO STRENUOUS EXERCISE (LIKE A BRISK WALK)?: 3 DAYS

## 2024-05-31 ASSESSMENT — PATIENT HEALTH QUESTIONNAIRE - PHQ9
10. IF YOU CHECKED OFF ANY PROBLEMS, HOW DIFFICULT HAVE THESE PROBLEMS MADE IT FOR YOU TO DO YOUR WORK, TAKE CARE OF THINGS AT HOME, OR GET ALONG WITH OTHER PEOPLE: NOT DIFFICULT AT ALL
SUM OF ALL RESPONSES TO PHQ QUESTIONS 1-9: 4
SUM OF ALL RESPONSES TO PHQ QUESTIONS 1-9: 4

## 2024-05-31 ASSESSMENT — SOCIAL DETERMINANTS OF HEALTH (SDOH): HOW OFTEN DO YOU GET TOGETHER WITH FRIENDS OR RELATIVES?: TWICE A WEEK

## 2024-05-31 ASSESSMENT — ANXIETY QUESTIONNAIRES
1. FEELING NERVOUS, ANXIOUS, OR ON EDGE: MORE THAN HALF THE DAYS
6. BECOMING EASILY ANNOYED OR IRRITABLE: SEVERAL DAYS
GAD7 TOTAL SCORE: 7
8. IF YOU CHECKED OFF ANY PROBLEMS, HOW DIFFICULT HAVE THESE MADE IT FOR YOU TO DO YOUR WORK, TAKE CARE OF THINGS AT HOME, OR GET ALONG WITH OTHER PEOPLE?: SOMEWHAT DIFFICULT
5. BEING SO RESTLESS THAT IT IS HARD TO SIT STILL: SEVERAL DAYS
2. NOT BEING ABLE TO STOP OR CONTROL WORRYING: SEVERAL DAYS
GAD7 TOTAL SCORE: 7
IF YOU CHECKED OFF ANY PROBLEMS ON THIS QUESTIONNAIRE, HOW DIFFICULT HAVE THESE PROBLEMS MADE IT FOR YOU TO DO YOUR WORK, TAKE CARE OF THINGS AT HOME, OR GET ALONG WITH OTHER PEOPLE: SOMEWHAT DIFFICULT
7. FEELING AFRAID AS IF SOMETHING AWFUL MIGHT HAPPEN: NOT AT ALL
4. TROUBLE RELAXING: SEVERAL DAYS
GAD7 TOTAL SCORE: 7
7. FEELING AFRAID AS IF SOMETHING AWFUL MIGHT HAPPEN: NOT AT ALL
3. WORRYING TOO MUCH ABOUT DIFFERENT THINGS: SEVERAL DAYS

## 2024-05-31 ASSESSMENT — PAIN SCALES - GENERAL: PAINLEVEL: NO PAIN (0)

## 2024-05-31 NOTE — PROGRESS NOTES
Preventive Care Visit  [facility]  RADHA Ernandez CNP, Family Medicine    Assessment & Plan     Annual physical exam    - Lipid panel reflex to direct LDL Non-fasting; Future  - REVIEW OF HEALTH MAINTENANCE PROTOCOL ORDERS  - Comprehensive metabolic panel (BMP + Alb, Alk Phos, ALT, AST, Total. Bili, TP); Future  - CBC with platelets; Future  - Lipid panel reflex to direct LDL Non-fasting  - Comprehensive metabolic panel (BMP + Alb, Alk Phos, ALT, AST, Total. Bili, TP)  - CBC with platelets    Tobacco dependence due to cigarettes  -patient started smoking when she was 14 y.o. , she is still smokes, but much more less. Due to history of smoking over 20 years I recommended lung cancer screening with chest CT   - CT Chest Lung Cancer Scrn Low Dose wo; Future    Cervical cancer screening    - Pap Screen with HPV - Recommended Age 30 - 65 Years    Hyperlipidemia LDL goal <100  -LDL cholesterol elevated  The 10-year ASCVD risk score (Lexie ALEMAN, et al., 2019) is: 6.5%    Values used to calculate the score:      Age: 59 years      Sex: Female      Is Non- : No      Diabetic: No      Tobacco smoker: Yes      Systolic Blood Pressure: 110 mmHg      Is BP treated: No      HDL Cholesterol: 53 mg/dL      Total Cholesterol: 261 mg/dL   -I advised patient to consider statin medication, or she can have coronary calcium screening to better estimate her risk factors   - Lipid panel reflex to direct LDL Non-fasting; Future  - Lipid panel reflex to direct LDL Non-fasting    Hypothyroidism, unspecified type  TSH   Date Value Ref Range Status   05/31/2024 1.29 0.30 - 4.20 uIU/mL Final   06/23/2022 2.35 0.40 - 4.00 mU/L Final   10/19/2020 1.08 0.40 - 4.00 mU/L Final    -well controlled   - levothyroxine (SYNTHROID/LEVOTHROID) 125 MCG tablet; Take 1 tablet (125 mcg) by mouth daily  - TSH with free T4 reflex; Future  - TSH with free T4 reflex    Essential tremor    - propranolol  (INDERAL) 60 MG tablet; Take 1 tablet (60 mg) by mouth 2 times daily  - TSH with free T4 reflex; Future  - Comprehensive metabolic panel (BMP + Alb, Alk Phos, ALT, AST, Total. Bili, TP); Future  - TSH with free T4 reflex  - Comprehensive metabolic panel (BMP + Alb, Alk Phos, ALT, AST, Total. Bili, TP)    Insomnia, unspecified type    - hydrOXYzine HCl (ATARAX) 25 MG tablet; Take 1-2 tablets (25-50 mg) by mouth daily as needed for anxiety or other (insomnia)    Cervical dystonia    - methocarbamol (ROBAXIN) 500 MG tablet; 1-2 x 500mg tab by mouth nightly as needed    Seronegative rheumatoid arthritis (H)  -doing well  -following with rheumatologist   - celecoxib (CELEBREX) 100 MG capsule; Take 1 capsule (100 mg) by mouth daily    Pain of right lower leg  -complains of chronic pain in her  right leg, no edema. She has history of DVT. Pain is possibly due to posthrombitic syndrome, recommended to repeat venous US   - US Lower Extremity Venous Duplex Right; Future    Counseling  Appropriate preventive services were discussed with this patient, including applicable screening as appropriate for fall prevention, nutrition, physical activity, Tobacco-use cessation, weight loss and cognition.  Checklist reviewing preventive services available has been given to the patient.  Reviewed patient's diet, addressing concerns and/or questions.   She is at risk for lack of exercise and has been provided with information to increase physical activity for the benefit of her well-being.     Subjective   Deana is a 59 year old, presenting for the following:  Physical (Wants to quit smoking )        5/31/2024     9:46 AM   Additional Questions   Roomed by robbie   Accompanied by self         5/31/2024     9:46 AM   Patient Reported Additional Medications   Patient reports taking the following new medications none              5/31/2024   General Health   How would you rate your overall physical health? (!) FAIR   Feel stress (tense,  anxious, or unable to sleep) To some extent   (!) STRESS CONCERN      5/31/2024   Nutrition   Three or more servings of calcium each day? Yes   Diet: Gluten-free/reduced   How many servings of fruit and vegetables per day? (!) 2-3   How many sweetened beverages each day? 0-1         5/31/2024   Exercise   Days per week of moderate/strenous exercise 3 days   Average minutes spent exercising at this level 40 min         5/31/2024   Social Factors   Frequency of gathering with friends or relatives Twice a week   Worry food won't last until get money to buy more No   Food not last or not have enough money for food? No   Do you have housing?  Yes   Are you worried about losing your housing? No   Lack of transportation? No   Unable to get utilities (heat,electricity)? No         5/31/2024   Fall Risk   Fallen 2 or more times in the past year? No   Trouble with walking or balance? No          5/31/2024   Dental   Dentist two times every year? (!) DECLINE          Today's PHQ-9 Score:       5/31/2024     9:41 AM   PHQ-9 SCORE   PHQ-9 Total Score MyChart 4 (Minimal depression)   PHQ-9 Total Score 4         5/31/2024   Substance Use   Alcohol more than 3/day or more than 7/wk No   Do you use any other substances recreationally? No     Social History     Tobacco Use    Smoking status: Every Day     Current packs/day: 0.50     Average packs/day: 0.5 packs/day for 30.0 years (15.0 ttl pk-yrs)     Types: Cigarettes    Smokeless tobacco: Never    Tobacco comments:     1 cigs daily    Vaping Use    Vaping status: Never Used   Substance Use Topics    Alcohol use: Yes     Comment: 3 drinks/week - helps     Drug use: No       Mammogram Screening - Mammogram every 1-2 years updated in Health Maintenance based on mutual decision making        5/31/2024   STI Screening   New sexual partner(s) since last STI/HIV test? No     History of abnormal Pap smear: No - age 30-64 HPV with reflex Pap every 5 years recommended        Latest Ref Rng &  "Units 11/13/2019     3:25 PM 11/13/2019     3:07 PM 3/16/2018     1:28 PM   PAP / HPV   PAP (Historical)   NIL  ASC-US    HPV 16 DNA NEG^Negative Negative      HPV 18 DNA NEG^Negative Negative      Other HR HPV NEG^Negative Negative        ASCVD Risk   The 10-year ASCVD risk score (Lexie ALEMAN, et al., 2019) is: 6.5%    Values used to calculate the score:      Age: 59 years      Sex: Female      Is Non- : No      Diabetic: No      Tobacco smoker: Yes      Systolic Blood Pressure: 110 mmHg      Is BP treated: No      HDL Cholesterol: 53 mg/dL      Total Cholesterol: 261 mg/dL          Review of Systems  Constitutional, HEENT, cardiovascular, pulmonary, gi and gu systems are negative, except as otherwise noted.     Objective    Exam  /66   Pulse 62   Temp 98.6  F (37  C) (Tympanic)   Resp 16   Wt 74.7 kg (164 lb 9.6 oz)   LMP 02/20/2015 (Exact Date)   SpO2 95%   BMI 27.60 kg/m     Estimated body mass index is 27.6 kg/m  as calculated from the following:    Height as of 7/18/23: 1.645 m (5' 4.75\").    Weight as of this encounter: 74.7 kg (164 lb 9.6 oz).    Physical Exam  GENERAL: alert and no distress  EYES: Eyes grossly normal to inspection, PERRL and conjunctivae and sclerae normal  HENT: ear canals and TM's normal, nose and mouth without ulcers or lesions  NECK: no adenopathy, no asymmetry, masses, or scars  RESP: no rales  and no rhonchi, mild inspiratory wheezing   CV: regular rate and rhythm, normal S1 S2, no S3 or S4, no murmur, click or rub, no peripheral edema   ABDOMEN: soft, nontender, no hepatosplenomegaly, no masses and bowel sounds normal   (female): normal female external genitalia, normal urethral meatus, normal vaginal mucosa  MS: no gross musculoskeletal defects noted, no edema  SKIN: no suspicious lesions or rashes  NEURO: Normal strength and tone, mentation intact and speech normal  PSYCH: mentation appears normal, affect normal/bright        Signed " Electronically by: RADHA Ernandez CNP    Answers submitted by the patient for this visit:  Patient Health Questionnaire (Submitted on 5/31/2024)  If you checked off any problems, how difficult have these problems made it for you to do your work, take care of things at home, or get along with other people?: Not difficult at all  PHQ9 TOTAL SCORE: 4  CAT-7 (Submitted on 5/31/2024)  CAT 7 TOTAL SCORE: 7  Answers submitted by the patient for this visit:  Patient Health Questionnaire (Submitted on 5/31/2024)  If you checked off any problems, how difficult have these problems made it for you to do your work, take care of things at home, or get along with other people?: Not difficult at all  PHQ9 TOTAL SCORE: 4  CAT-7 (Submitted on 5/31/2024)  CAT 7 TOTAL SCORE: 7

## 2024-06-03 ENCOUNTER — TELEPHONE (OUTPATIENT)
Dept: FAMILY MEDICINE | Facility: CLINIC | Age: 59
End: 2024-06-03
Payer: COMMERCIAL

## 2024-06-03 LAB
HPV HR 12 DNA CVX QL NAA+PROBE: NEGATIVE
HPV16 DNA CVX QL NAA+PROBE: NEGATIVE
HPV18 DNA CVX QL NAA+PROBE: NEGATIVE
HUMAN PAPILLOMA VIRUS FINAL DIAGNOSIS: NORMAL

## 2024-06-05 LAB
BKR LAB AP GYN ADEQUACY: NORMAL
BKR LAB AP GYN INTERPRETATION: NORMAL
BKR LAB AP PREVIOUS ABNORMAL: NORMAL
PATH REPORT.COMMENTS IMP SPEC: NORMAL
PATH REPORT.COMMENTS IMP SPEC: NORMAL
PATH REPORT.RELEVANT HX SPEC: NORMAL

## 2024-06-19 ENCOUNTER — OFFICE VISIT (OUTPATIENT)
Dept: RHEUMATOLOGY | Facility: CLINIC | Age: 59
End: 2024-06-19
Payer: COMMERCIAL

## 2024-06-19 VITALS
WEIGHT: 160 LBS | SYSTOLIC BLOOD PRESSURE: 119 MMHG | DIASTOLIC BLOOD PRESSURE: 76 MMHG | BODY MASS INDEX: 26.83 KG/M2 | HEART RATE: 78 BPM

## 2024-06-19 DIAGNOSIS — M06.09 RHEUMATOID ARTHRITIS OF MULTIPLE SITES WITH NEGATIVE RHEUMATOID FACTOR (H): Primary | ICD-10-CM

## 2024-06-19 DIAGNOSIS — Z79.899 HIGH RISK MEDICATION USE: ICD-10-CM

## 2024-06-19 PROBLEM — M41.9 SCOLIOSIS DEFORMITY OF SPINE: Status: ACTIVE | Noted: 2023-01-02

## 2024-06-19 PROBLEM — G24.9 DYSTONIA: Status: ACTIVE | Noted: 2024-06-19

## 2024-06-19 PROCEDURE — 99214 OFFICE O/P EST MOD 30 MIN: CPT | Performed by: INTERNAL MEDICINE

## 2024-06-19 PROCEDURE — G2211 COMPLEX E/M VISIT ADD ON: HCPCS | Performed by: INTERNAL MEDICINE

## 2024-06-19 RX ORDER — ADALIMUMAB-BWWD 40 MG/.4ML
40 SOLUTION SUBCUTANEOUS
Qty: 0.8 ML | Refills: 6 | Status: SHIPPED | OUTPATIENT
Start: 2024-06-19

## 2024-06-19 RX ORDER — METHOTREXATE 2.5 MG/1
20 TABLET ORAL WEEKLY
Qty: 104 TABLET | Refills: 1 | Status: SHIPPED | OUTPATIENT
Start: 2024-06-19 | End: 2024-08-08

## 2024-06-19 NOTE — NURSING NOTE
RAPID3 (0-30) Cumulative Score  5.3          RAPID3 Weighted Score (divide #4 by 3 and that is the weighted score)  1.7

## 2024-06-19 NOTE — PROGRESS NOTES
Rheumatology Clinic Visit      Tequila Neumann MRN# [id number]   YOB: 1965 Age: 59 year old      Date of visit: 6/19/24   PCP: Emma Beltran    Chief Complaint   Patient presents with:  RECHECK: RA    Assessment and Plan     1.  Seronegative rheumatoid arthritis: Reportedly diagnosed in 2010.  Previously followed by Keyon Leon, Devon Zaman, William Vaca and Myrna Schrader.  Establish care with me on 4/26/2023, at which time doing well on combination of methotrexate and Humira.  Previously on azathioprine, hydroxychloroquine, Orencia ([id number]; stopped at one point when doing well and then didn't work when it was restarted).  Reportedly initial presentation of tendinitis of the wrist.  Sulfa allergy.  Currently on methotrexate 20 mg oral once weekly and adalimumab 40 mg SQ every 14 days (Humira initially, but then insurance required Hadlima starting in 2024; worsening arthritis symptoms when she had stopped adalimumab in the past).  Arthritis is well controlled at this time.   Chronic illness, stable.     - Continue methotrexate 20 mg once weekly     - Continue folic acid 1 mg daily  - Continue adalimumab 40 mg SQ every 14 days  - Labs every 6-12 weeks: CBC, Creatinine, Hepatic Panel, ESR, CRP (frequency is to accommodate frequent travels by patient; printed orders given to the patient because she plans to have performed at outside facilities)    High risk medication requiring intensive toxicity monitoring at least quarterly.    2. Bilateral trochanteric bursitis    4.  Vaccinations: Vaccinations reviewed with Ms. Neumann.    - Influenza: encouraged yearly vaccination  - [id number]: up to date  - [id number]: up to date  - COVID-19: Advised keeping updated  - Shingrix: Up to date    Total minutes spent in evaluation with patient, documentation, , and review of pertinent studies and chart notes: 24  The longitudinal plan of care for the rheumatology problem(s) were addressed during  this visit.  Due to added complexity of care, we will continue to support the patient and the subsequent management of this condition with ongoing continuity of care.       Ms. Neumann verbalized agreement with and understanding of the rational for the diagnosis and treatment plan.  All questions were answered to best of my ability and the patient's satisfaction. Ms. Neumann was advised to contact the clinic with any questions that may arise after the clinic visit.      Thank you for involving me in the care of the patient    Return to clinic: 5-6 months    HPI   Tequila Neumann is a 59 year old female with a past medical history significant for migraines, hypothyroidism, insomnia, tremor, anxiety, hyperlipidemia, nutcracker esophagus history, and rheumatoid arthritis who presents for follow-up of rheumatoid arthritis.    4/26/2023:  Previously followed by Keyon Leon, Devon Zaman, William Vaca and Myrna Schrader.  Most recently was followed at the [facility].  Transferring care because of location; she lives in [place] and [place] and comes to [place] where her family lives.  Previously was on azathioprine and hydroxychloroquine but she does not recall using these.  At one point was thought to have an allergy to methotrexate because of associated hives but is on it now and is doing well.  Previously was on  and Orencia and was doing well but at one point her rheumatologist felt that she may have fibromyalgia not rheumatoid arthritis Orencia was stopped and her arthritis flared, primarily in her knees.  Orencia was restarted at that time but not effective.  Therefore, changed to Humira in 2020 and has been doing well since then.  Also taking methotrexate 20 mg once weekly.  Has pain in her left shoulder since doing planks about 3 months ago, and it is improving with time.  Shoulder pain is worse with activity and improves with rest.  Also pain at the bilateral MTPs, occurs and resolves spontaneously.   History of cervical dystonia.  Bilateral hips hurt on the lateral aspect that is reproduced with self-evident palpation.    11/1/2023: Stopped Humira approximately 2 months ago.  She has stopped Humira because of bacterial conjunctivitis diagnosed by ophthalmology.  She said ophthalmology previously diagnosed with dry eyes but then later diagnosed with a bacterial conjunctivitis that was treated with antibiotic eyedrops and then as soon as she restarted Humira her eye symptoms worsen so she stopped Humira again.  Still with injected, dry, mildly painful eyes but no blurry vision.  She says she is going to see ophthalmology again.    Today, 6/19/2024: RA controlled.  Hadlima has more injection site pain than Humira but it is tolerable and she would like to continue it.  Hadlima is effective for arthritis management.  Currently without joint pain or swelling.  No morning stiffness or gelling phenomenon.  No eye pain or redness.    Denies fevers, chills, nausea, vomiting, constipation, diarrhea. No abdominal pain. No chest pain/pressure, palpitations, or shortness of breath. No LE swelling. No neck pain currently. No oral or nasal sores.  No rash.     Tobacco: Former cigarette smoker  EtOH: 3-4 drinks per week  Drugs: None  Occupation: Supply chain management at , now retired.  Now living [place] [facility] and [place], and visits [place] where family lives    ROS   12 point review of system was completed and negative except as noted in the HPI     Active Problem List     Patient Active Problem List   Diagnosis    ESSENTIAL TREMOR     Migraine    Hypothyroidism    Tobacco use disorder    RA (rheumatoid arthritis) (H)    CARDIOVASCULAR SCREENING; LDL GOAL LESS THAN 160    Hx of LASIK    Nutcracker esophagus    Insomnia    Cervical dystonia    Symptomatic menopausal or female climacteric states    ASCUS with positive high risk HPV cervical    Myofascial muscle pain    Seronegative rheumatoid arthritis (H)    Torticollis,  spasmodic    Avoids wheat/gluten and refined sugar and feeling better    EGD suggestive of achalasia    Familial dystonia    Tobacco dependence due to cigarettes    Personal history of other diseases of the digestive system    Fibromyalgia    Major depressive disorder, single episode, moderate (H)    Essential tremor    Anxiety    Hyperlipidemia LDL goal <100    Dry eyes    Dystonia    Scoliosis deformity of spine     Past Medical History     Past Medical History:   Diagnosis Date    Abnormal Pap smear of cervix 1985    s/p LEEP    Abnormal Pap smear of cervix 03/16/2018    See problem list    Avoids wheat/gluten and refined sugar and feeling better 1/10/2019    Calculus of GB w/ other cystitis 1/3/2012    Cervical high risk HPV (human papillomavirus) test positive 03/16/2018    See problem list    EGD suggestive of achalasia 1/10/2019    Familial dystonia 1/10/2019    Fibromyalgia      Past Surgical History     Past Surgical History:   Procedure Laterality Date    ARTHROSCOPY KNEE RT/LT Left 12/08/2005    Left medial meniscal tear bilat    COLONOSCOPY  7/11/2014    Procedure: COLONOSCOPY;  Surgeon: Randell Mcgarry MD;  Location: [facility]    D & C  x 2    ESOPHAGOSCOPY, GASTROSCOPY, DUODENOSCOPY (EGD), COMBINED  7/16/2012    Procedure: COMBINED ESOPHAGOSCOPY, GASTROSCOPY, DUODENOSCOPY (EGD);;  Surgeon: Lito Kwon MD;  Location: [facility]    HC ESOPH/GAS REFLUX TEST W NASAL IMPED ELECTRODE  8/23/2012    Procedure: ESOPHAGEAL IMPEDENCE FUNCTION TEST 1 HOUR OR LESS;  Surgeon: Tequila Boone MD;  Location: [facility]    LAPAROSCOPIC CHOLECYSTECTOMY  1/25/2012    Procedure:LAPAROSCOPIC CHOLECYSTECTOMY; Laparoscopic vs Open Cholecystectomy; Surgeon:RANDELL MCGARRY; Location:[facility]    LASIK Bilateral 2000    LEEP TX, CERVICAL  1985    TUBAL LIGATION  1998     Allergy     Allergies   Allergen Reactions    Primidone      Made her feel strange and out of it    Sulfa Antibiotics Nausea     Topamax      Personality change; memory issues    Topiramate Other (See Comments)     Personality change. Memory   Personality change; memory issues    Plaquenil [Hydroxychloroquine Sulfate] Rash     Current Medication List     Current Outpatient Medications   Medication Sig Dispense Refill    Adalimumab-bwwd (HADLIMA PUSHTOUCH) 40 MG/0.4ML SOAJ Inject 40 mg Subcutaneous every 14 days . Hold during any infection and seek medical attention. 0.8 mL 6    celecoxib (CELEBREX) 100 MG capsule Take 1 capsule (100 mg) by mouth daily 90 capsule 3    cholecalciferol (VITAMIN D) 1000 UNIT tablet Take 1 tablet by mouth daily.      folic acid (FOLVITE) 1 MG tablet Take 1 tablet (1 mg) by mouth daily 100 tablet 2    hydrOXYzine HCl (ATARAX) 25 MG tablet Take 1-2 tablets (25-50 mg) by mouth daily as needed for anxiety or other (insomnia) 90 tablet 3    levothyroxine (SYNTHROID/LEVOTHROID) 125 MCG tablet Take 1 tablet (125 mcg) by mouth daily 90 tablet 3    methocarbamol (ROBAXIN) 500 MG tablet 1-2 x 500mg tab by mouth nightly as needed 180 tablet 3    methotrexate 2.5 MG tablet Take 8 tablets (20 mg) by mouth once a week . This is a once weekly medication, taken on the same day of each week. 104 tablet 2    propranolol (INDERAL) 60 MG tablet Take 1 tablet (60 mg) by mouth 2 times daily 180 tablet 3     No current facility-administered medications for this visit.     Social History   See HPI    Family History     Family History   Problem Relation Age of Onset    C.A.D. Father     Diabetes Father     Hypertension Father     Cerebrovascular Disease Father     Lipids Father     Breast Cancer Maternal Grandmother         age 80+    Alzheimer Disease Maternal Grandmother         age 70+    Cancer Mother         sarcoma, small intestinal    Neurologic Disorder Mother         tremor    Tremor Mother     Dystonia Mother     Colon Polyps Sister     Other - See Comments Sister         no clear stroke    Dystonia Daughter     Cancer -  "colorectal No family hx of     Prostate Cancer No family hx of      Physical Exam     Temp Readings from Last 3 Encounters:   05/31/24 98.6  F (37  C) (Tympanic)   11/01/23 98  F (36.7  C) (Oral)   07/18/23 97.7  F (36.5  C) (Tympanic)     BP Readings from Last 5 Encounters:   06/19/24 119/76   05/31/24 110/66   11/01/23 118/64   07/18/23 100/60   06/27/23 122/84     Pulse Readings from Last 1 Encounters:   06/19/24 78     Resp Readings from Last 1 Encounters:   05/31/24 16     Estimated body mass index is 26.83 kg/m  as calculated from the following:    Height as of 7/18/23: 1.645 m (5' 4.75\").    Weight as of this encounter: 72.6 kg (160 lb).      GEN: NAD. Healthy appearing adult.   HEENT:  Anicteric, noninjected sclera. No obvious external lesions of the ear and nose. Hearing intact.  CV: S1, S2. RRR. No m/r/g  PULM: No increased work of breathing. CTA bilaterally   MSK: MCPs, PIPs, DIPs without swelling or tenderness to palpation.  Wrists without swelling or tenderness to palpation.  Elbows and shoulders without swelling or tenderness to palpation. Knees, ankles, and MTPs without swelling or tenderness to palpation.    SKIN: No rash or jaundice seen  PSYCH: Alert. Appropriate.      Labs / Imaging (select studies)     CBC  Recent Labs   Lab Test 05/31/24  1025 10/31/23  1402 04/26/23  1226 11/22/22  1522 11/29/21  1546 05/21/21  0855 03/19/20  1454 01/10/20  1013   WBC 8.5 7.3 7.1 8.8   < > 6.6 7.8 8.6   RBC 4.54 4.42 4.53 4.14   < > 4.58 4.45 4.75   HGB 14.8 14.7 14.5 14.3   < > 14.9 14.2 15.0   HCT 44.9 44.7 43.7 42.1   < > 43.3 43.1 46.2   MCV 99 101* 97 102*   < > 95 97 97   RDW 12.9 13.5 13.6 13.7   < > 13.4 14.7 12.7    192 193 179   < > 193 250 241   MCH 32.6 33.3* 32.0 34.5*   < > 32.5 31.9 31.6   MCHC 33.0 32.9 33.2 34.0   < > 34.4 32.9 32.5   NEUTROPHIL  --  46 42 42   < > 32.4 47.8 37.8   LYMPH  --  46 49 48   < > 55.9 41.1 51.2   MONOCYTE  --  6 8 9   < > 9.0 9.1 7.7   EOSINOPHIL  --  2 2 2   " < > 2.0 1.7 2.2   BASOPHIL  --  1 0 0   < > 0.5 0.3 0.5   ANEU  --   --   --   --   --  2.1 3.8 3.2   ALYM  --   --   --   --   --  3.7 3.2 4.4   GAYATHRI  --   --   --   --   --  0.6 0.7 0.7   AEOS  --   --   --   --   --  0.1 0.1 0.2   ABAS  --   --   --   --   --  0.0 0.0 0.0   ANEUTAUTO  --  3.4 3.0 3.7   < >  --   --   --    ALYMPAUTO  --  3.3 3.5 4.2   < >  --   --   --    AMONOAUTO  --  0.4 0.5 0.8   < >  --   --   --    AEOSAUTO  --  0.1 0.1 0.1   < >  --   --   --    ABSBASO  --  0.0 0.0 0.0   < >  --   --   --     < > = values in this interval not displayed.     CMP  Recent Labs   Lab Test 05/31/24  1025 10/31/23  1402 04/26/23  1226 11/22/22  1522 06/23/22  0843 11/29/21  1546 05/21/21  0855 11/01/19  1029 09/17/18  0711     --   --   --  141  --  136 136 138   POTASSIUM 4.5  --   --   --  4.0  --  4.0 3.8 3.8   CHLORIDE 104  --   --   --  108  --  103 103 105   CO2 24  --   --   --  27  --  29 30 29   ANIONGAP 13  --   --   --  6  --  4 3 4   GLC 94  --   --   --  88  --  89 87 88   BUN 18.2  --   --   --  12  --  13 13 16   CR 0.76 0.87 0.64   < > 0.66   < > 0.72 0.64 0.80   GFRESTIMATED 89 76 >90   < > >90   < > >90 >90 74   GFRESTBLACK  --   --   --   --   --   --  >90 >90 >90   CELI 9.6  --   --   --  9.1  --  8.6 9.0 8.3*   BILITOTAL 0.7 0.7 0.7  --  1.3   < > 1.0  --   --    ALBUMIN 4.4 4.2 3.6  --  4.1   < > 3.8  --   --    PROTTOTAL 7.9 7.2 7.3  --  7.8   < > 7.1  --   --    ALKPHOS 105 94 111  --  93   < > 119  --   --    AST 31 22 16   < > 15   < > 15  --   --    ALT 21 25 31  --  24   < > 27  --   --     < > = values in this interval not displayed.     Calcium/VitaminD  Recent Labs   Lab Test 05/31/24  1025 06/23/22  0843 05/21/21  0855 11/01/19  1029 09/17/18  0711 05/22/17  1713 08/01/16  1010   CELI 9.6 9.1 8.6 9.0   < >  --   --    VITDT  --   --   --  72  --  40 52    < > = values in this interval not displayed.     ESR/CRP  Recent Labs   Lab Test 10/31/23  1402 04/26/23  1226  05/21/21  0855 03/19/20  1454   SED 8 6 6 14   CRP  --  <2.9 <2.9 15.6*   CRPI <3.00  --   --   --      Hepatitis B  Recent Labs   Lab Test 04/26/23  1226   HBCAB Nonreactive   HEPBANG Nonreactive     Hepatitis C  Recent Labs   Lab Test 04/26/23  1226   HCVAB Nonreactive     Tuberculosis Screening  Recent Labs   Lab Test 04/26/23  1226   TBRES Positive*     HIV Screening  Recent Labs   Lab Test 08/18/23  1406   HIAGAB Nonreactive     -----------------------------------  5/10/2023 [facility] chest x-ray normal and 6/7/2023 QuantiFERON-TB gold plus negative      Immunization History     Immunization History   Administered Date(s) Administered    COVID-19 MONOVALENT 12+ (Pfizer) 11/26/2021    COVID-19 Monovalent 18+ (Moderna) 04/22/2021, 05/21/2021    Influenza (IIV3) PF 09/28/2010, 10/20/2011, 11/19/2012, 10/01/2018    Influenza Vaccine >6 months,quad, PF 11/13/2019    Pneumo Conj 13-V (2010&after) 04/01/2019    Pneumococcal 23 valent 11/19/2012    TD,PF 7+ (Tenivac) 01/01/1984, 05/13/1997, 06/27/2003    TDAP (Adacel,Boostrix) 09/26/2021    TDAP Vaccine (Adacel) 07/28/2014    Td (Adult), Adsorbed 06/27/2000    Zoster recombinant adjuvanted (SHINGRIX) 07/18/2023, 11/01/2023          Chart documentation done in part with Dragon Voice recognition Software. Although reviewed after completion, some word and grammatical error may remain.    Nghia Patel MD

## 2024-06-24 ENCOUNTER — HOSPITAL ENCOUNTER (OUTPATIENT)
Dept: CT IMAGING | Facility: CLINIC | Age: 59
Discharge: HOME | End: 2024-06-24
Attending: NURSE PRACTITIONER | Admitting: NURSE PRACTITIONER
Payer: COMMERCIAL

## 2024-06-24 DIAGNOSIS — F17.210 TOBACCO DEPENDENCE DUE TO CIGARETTES: ICD-10-CM

## 2024-06-24 PROCEDURE — 71271 CT THORAX LUNG CANCER SCR C-: CPT

## 2024-06-30 ENCOUNTER — MYC MEDICAL ADVICE (OUTPATIENT)
Dept: FAMILY MEDICINE | Facility: CLINIC | Age: 59
End: 2024-06-30
Payer: COMMERCIAL

## 2024-06-30 DIAGNOSIS — E78.5 HYPERLIPIDEMIA LDL GOAL <100: Primary | ICD-10-CM

## 2024-06-30 DIAGNOSIS — L30.9 ECZEMA, UNSPECIFIED TYPE: ICD-10-CM

## 2024-07-01 ENCOUNTER — HOSPITAL ENCOUNTER (OUTPATIENT)
Dept: ULTRASOUND IMAGING | Facility: CLINIC | Age: 59
Discharge: HOME | End: 2024-07-01
Attending: NURSE PRACTITIONER | Admitting: NURSE PRACTITIONER
Payer: COMMERCIAL

## 2024-07-01 DIAGNOSIS — M79.661 PAIN OF RIGHT LOWER LEG: ICD-10-CM

## 2024-07-01 PROCEDURE — 93971 EXTREMITY STUDY: CPT | Mod: RT

## 2024-07-01 RX ORDER — ATORVASTATIN CALCIUM 10 MG/1
10 TABLET, FILM COATED ORAL DAILY
Qty: 90 TABLET | Refills: 3 | Status: SHIPPED | OUTPATIENT
Start: 2024-07-01 | End: 2024-10-07

## 2024-07-01 RX ORDER — DESONIDE 0.5 MG/G
CREAM TOPICAL 2 TIMES DAILY
Qty: 15 G | Refills: 1 | Status: SHIPPED | OUTPATIENT
Start: 2024-07-01

## 2024-07-23 DIAGNOSIS — E89.0 POSTABLATIVE HYPOTHYROIDISM: ICD-10-CM

## 2024-07-23 NOTE — TELEPHONE ENCOUNTER
Care Due:                  Date            Visit Type   Department     Provider  --------------------------------------------------------------------------------                                           [facility]  Last Visit: 06-      [facility]       CRYSTAL  PAGE  Next Visit: None Scheduled  None         None Found                                                            Last  Test          Frequency    Reason                     Performed    Due Date  --------------------------------------------------------------------------------  Office Visit  12 months..  levothyroxine............  06- 05-    TSH.........  12 months..  levothyroxine............  Not Found    Overdue    [employer] Embedded Care Due Messages. Reference number: [id number]. 7/23/2024 3:14:17 PM LEANNA

## 2024-07-31 RX ORDER — LEVOTHYROXINE SODIUM 137 UG/1
137 TABLET ORAL DAILY
Qty: 90 TABLET | Refills: 0 | OUTPATIENT
Start: 2024-07-31

## 2024-08-01 ENCOUNTER — TELEPHONE (OUTPATIENT)
Dept: RHEUMATOLOGY | Facility: CLINIC | Age: 59
End: 2024-08-01
Payer: COMMERCIAL

## 2024-08-01 ENCOUNTER — MYC MEDICAL ADVICE (OUTPATIENT)
Dept: FAMILY MEDICINE | Facility: CLINIC | Age: 59
End: 2024-08-01
Payer: COMMERCIAL

## 2024-08-01 ENCOUNTER — MYC REFILL (OUTPATIENT)
Dept: RHEUMATOLOGY | Facility: CLINIC | Age: 59
End: 2024-08-01
Payer: COMMERCIAL

## 2024-08-01 DIAGNOSIS — M06.09 RHEUMATOID ARTHRITIS OF MULTIPLE SITES WITH NEGATIVE RHEUMATOID FACTOR (H): ICD-10-CM

## 2024-08-01 DIAGNOSIS — E03.9 HYPOTHYROIDISM, UNSPECIFIED TYPE: ICD-10-CM

## 2024-08-01 RX ORDER — LEVOTHYROXINE SODIUM 137 UG/1
137 TABLET ORAL DAILY
Qty: 90 TABLET | Refills: 3 | Status: SHIPPED | OUTPATIENT
Start: 2024-08-01

## 2024-08-01 NOTE — TELEPHONE ENCOUNTER
Patient started Hadlima Pushtouch 12/2023    Refill due.    Thank You,     Marquita Zaldivar [facility]  [facility]  [email or web address]  [email or web address]  Phone: [phone]  Fax: [phone]

## 2024-08-01 NOTE — TELEPHONE ENCOUNTER
Called and spoke to care coordinator himanshu who states they do not need a new ERX she sees the refills and they sent an order out yesterday.    MARY Figueroa RN 8/1/2024 3:21 PM

## 2024-08-01 NOTE — TELEPHONE ENCOUNTER
[facility] Call Center    Phone Message    May a detailed message be left on voicemail: yes     Reason for Call: Medication Refill Request    Has the patient contacted the pharmacy for the refill? Yes   Name of medication being requested: Adalimumab-bwwd (HADLIMA PUSHTOUCH) 40 MG/0.4ML SOAJ   Provider who prescribed the medication: Dr Patel   Pharmacy:   [facility] - [address]     Date medication is needed: asap         Action Taken: Other: rheum     Travel Screening: Not Applicable     Date of Service:

## 2024-08-01 NOTE — TELEPHONE ENCOUNTER
"Forwarding MyChart refill request for levothyroxine to PCP due to increased dose.   It appears there is a refill encounter from [facility] in [place], visible from [facility] dated 5/23/24 that does indicate levothyroxine 137 mcg.  It is pasted below.   Are you okay with sending new Rx, or would you like a visit to discuss?    Cynthia Alfonso RN  [facility]      \"Ordered Prescriptions  - documented in this encounter  Ordered Prescriptions  Prescription Sig Dispensed Refills Start Date End Date   levothyroxine (SYNTHROID, LEVOTHROID) 137 mcg tablet   Indications: Postablative hypothyroidism\" Take 137 mcg by mouth daily 90 tablet    05/23/2024 05/23/2025       "

## 2024-08-02 ENCOUNTER — MYC MEDICAL ADVICE (OUTPATIENT)
Dept: RHEUMATOLOGY | Facility: CLINIC | Age: 59
End: 2024-08-02
Payer: COMMERCIAL

## 2024-08-02 DIAGNOSIS — M06.09 RHEUMATOID ARTHRITIS OF MULTIPLE SITES WITH NEGATIVE RHEUMATOID FACTOR (H): ICD-10-CM

## 2024-08-05 RX ORDER — METHOTREXATE 2.5 MG/1
20 TABLET ORAL WEEKLY
Qty: 104 TABLET | Refills: 1 | OUTPATIENT
Start: 2024-08-05

## 2024-08-07 ENCOUNTER — LAB (OUTPATIENT)
Dept: LAB | Facility: CLINIC | Age: 59
End: 2024-08-07
Payer: COMMERCIAL

## 2024-08-07 DIAGNOSIS — M06.09 RHEUMATOID ARTHRITIS OF MULTIPLE SITES WITH NEGATIVE RHEUMATOID FACTOR (H): ICD-10-CM

## 2024-08-07 DIAGNOSIS — Z79.899 HIGH RISK MEDICATION USE: ICD-10-CM

## 2024-08-07 LAB
ALBUMIN SERPL BCG-MCNC: 4.4 G/DL (ref 3.5–5.2)
ALP SERPL-CCNC: 108 U/L (ref 40–150)
ALT SERPL W P-5'-P-CCNC: 26 U/L (ref 0–50)
AST SERPL W P-5'-P-CCNC: 23 U/L (ref 0–45)
BASOPHILS # BLD AUTO: 0 10E3/UL (ref 0–0.2)
BASOPHILS NFR BLD AUTO: 0 %
BILIRUB DIRECT SERPL-MCNC: <0.2 MG/DL (ref 0–0.3)
BILIRUB SERPL-MCNC: 0.7 MG/DL
CREAT SERPL-MCNC: 0.71 MG/DL (ref 0.51–0.95)
CRP SERPL-MCNC: <3 MG/L
EGFRCR SERPLBLD CKD-EPI 2021: >90 ML/MIN/1.73M2
EOSINOPHIL # BLD AUTO: 0.1 10E3/UL (ref 0–0.7)
EOSINOPHIL NFR BLD AUTO: 2 %
ERYTHROCYTE [DISTWIDTH] IN BLOOD BY AUTOMATED COUNT: 12.8 % (ref 10–15)
ERYTHROCYTE [SEDIMENTATION RATE] IN BLOOD BY WESTERGREN METHOD: 8 MM/HR (ref 0–30)
HCT VFR BLD AUTO: 42.6 % (ref 35–47)
HGB BLD-MCNC: 14.9 G/DL (ref 11.7–15.7)
IMM GRANULOCYTES # BLD: 0 10E3/UL
IMM GRANULOCYTES NFR BLD: 0 %
LYMPHOCYTES # BLD AUTO: 3.7 10E3/UL (ref 0.8–5.3)
LYMPHOCYTES NFR BLD AUTO: 50 %
MCH RBC QN AUTO: 34 PG (ref 26.5–33)
MCHC RBC AUTO-ENTMCNC: 35 G/DL (ref 31.5–36.5)
MCV RBC AUTO: 97 FL (ref 78–100)
MONOCYTES # BLD AUTO: 0.7 10E3/UL (ref 0–1.3)
MONOCYTES NFR BLD AUTO: 9 %
NEUTROPHILS # BLD AUTO: 2.9 10E3/UL (ref 1.6–8.3)
NEUTROPHILS NFR BLD AUTO: 40 %
NRBC # BLD AUTO: 0 10E3/UL
NRBC BLD AUTO-RTO: 0 /100
PLATELET # BLD AUTO: 177 10E3/UL (ref 150–450)
PROT SERPL-MCNC: 7.4 G/DL (ref 6.4–8.3)
RBC # BLD AUTO: 4.38 10E6/UL (ref 3.8–5.2)
WBC # BLD AUTO: 7.4 10E3/UL (ref 4–11)

## 2024-08-07 PROCEDURE — 86140 C-REACTIVE PROTEIN: CPT

## 2024-08-07 PROCEDURE — 85652 RBC SED RATE AUTOMATED: CPT

## 2024-08-07 PROCEDURE — 80076 HEPATIC FUNCTION PANEL: CPT

## 2024-08-07 PROCEDURE — 85025 COMPLETE CBC W/AUTO DIFF WBC: CPT

## 2024-08-07 PROCEDURE — 82565 ASSAY OF CREATININE: CPT

## 2024-08-07 PROCEDURE — 36415 COLL VENOUS BLD VENIPUNCTURE: CPT

## 2024-08-08 RX ORDER — METHOTREXATE 2.5 MG/1
20 TABLET ORAL WEEKLY
Qty: 104 TABLET | Refills: 1 | Status: SHIPPED | OUTPATIENT
Start: 2024-08-08 | End: 2024-09-23

## 2024-08-09 ENCOUNTER — ANESTHESIA EVENT (OUTPATIENT)
Dept: GASTROENTEROLOGY | Facility: CLINIC | Age: 59
End: 2024-08-09
Payer: COMMERCIAL

## 2024-08-09 ASSESSMENT — LIFESTYLE VARIABLES: TOBACCO_USE: 1

## 2024-08-09 NOTE — ANESTHESIA PREPROCEDURE EVALUATION
Anesthesia Pre-Procedure Evaluation    Patient: Tequila Neumann   MRN: [id number] : 1965        Procedure : Procedure(s):  Colonoscopy          Past Medical History:   Diagnosis Date    Abnormal Pap smear of cervix     s/p LEEP    Abnormal Pap smear of cervix 2018    See problem list    Avoids wheat/gluten and refined sugar and feeling better 1/10/2019    Calculus of GB w/ other cystitis 1/3/2012    Cervical high risk HPV (human papillomavirus) test positive 2018    See problem list    EGD suggestive of achalasia 1/10/2019    Familial dystonia 1/10/2019    Fibromyalgia       Past Surgical History:   Procedure Laterality Date    ARTHROSCOPY KNEE RT/LT Left 2005    Left medial meniscal tear bilat    COLONOSCOPY  2014    Procedure: COLONOSCOPY;  Surgeon: Randell Bonilla MD;  Location: [facility]    D & C  x 2    ESOPHAGOSCOPY, GASTROSCOPY, DUODENOSCOPY (EGD), COMBINED  2012    Procedure: COMBINED ESOPHAGOSCOPY, GASTROSCOPY, DUODENOSCOPY (EGD);;  Surgeon: Lito Kwon MD;  Location:  GI    HC ESOPH/GAS REFLUX TEST W NASAL IMPED ELECTRODE  2012    Procedure: ESOPHAGEAL IMPEDENCE FUNCTION TEST 1 HOUR OR LESS;  Surgeon: Tequila Boone MD;  Location:  GI    LAPAROSCOPIC CHOLECYSTECTOMY  2012    Procedure:LAPAROSCOPIC CHOLECYSTECTOMY; Laparoscopic vs Open Cholecystectomy; Surgeon:RANDELL BONILLA; Location:[facility] OR    LASIK Bilateral     LEEP TX, CERVICAL  1985    TUBAL LIGATION        Allergies   Allergen Reactions    Primidone      Made her feel strange and out of it    Sulfa Antibiotics Nausea    Topamax      Personality change; memory issues    Topiramate Other (See Comments)     Personality change. Memory   Personality change; memory issues    Plaquenil [Hydroxychloroquine Sulfate] Rash      Social History     Tobacco Use    Smoking status: Every Day     Current packs/day: 0.50     Average packs/day: 0.5 packs/day for 30.0  "years (15.0 ttl pk-yrs)     Types: Cigarettes    Smokeless tobacco: Never    Tobacco comments:     1 cigs daily    Substance Use Topics    Alcohol use: Yes     Comment: 3 drinks/week - helps       Wt Readings from Last 1 Encounters:   06/19/24 72.6 kg (160 lb)        Anesthesia Evaluation   Pt has had prior anesthetic.         ROS/MED HX  ENT/Pulmonary:     (+)                tobacco use, Current use,                       Neurologic:       Cardiovascular:     (+) Dyslipidemia - -   -  - -                                      METS/Exercise Tolerance:     Hematologic:       Musculoskeletal:   (+)  arthritis,             GI/Hepatic:       Renal/Genitourinary:       Endo:       Psychiatric/Substance Use:     (+) psychiatric history anxiety and depression       Infectious Disease:       Malignancy:       Other:            Physical Exam    Airway        Mallampati: I   TM distance: > 3 FB   Neck ROM: full   Mouth opening: > 3 cm    Respiratory Devices and Support         Dental       (+) Minor Abnormalities - some fillings, tiny chips      Cardiovascular   cardiovascular exam normal          Pulmonary                   OUTSIDE LABS:  CBC:   Lab Results   Component Value Date    WBC 7.4 08/07/2024    WBC 8.5 05/31/2024    HGB 14.9 08/07/2024    HGB 14.8 05/31/2024    HCT 42.6 08/07/2024    HCT 44.9 05/31/2024     08/07/2024     05/31/2024     BMP:   Lab Results   Component Value Date     05/31/2024     06/23/2022    POTASSIUM 4.5 05/31/2024    POTASSIUM 4.0 06/23/2022    CHLORIDE 104 05/31/2024    CHLORIDE 108 06/23/2022    CO2 24 05/31/2024    CO2 27 06/23/2022    BUN 18.2 05/31/2024    BUN 12 06/23/2022    CR 0.71 08/07/2024    CR 0.76 05/31/2024    GLC 94 05/31/2024    GLC 88 06/23/2022     COAGS: No results found for: \"PTT\", \"INR\", \"FIBR\"  POC:   Lab Results   Component Value Date    HCG Negative 07/11/2014     HEPATIC:   Lab Results   Component Value Date    ALBUMIN 4.4 08/07/2024    " PROTTOTAL 7.4 08/07/2024    ALT 26 08/07/2024    AST 23 08/07/2024    GGT 17 11/30/2011    ALKPHOS 108 08/07/2024    BILITOTAL 0.7 08/07/2024     OTHER:   Lab Results   Component Value Date    A1C 5.3 09/14/2006    CELI 9.6 05/31/2024    MAG 2.0 06/23/2022    LIPASE 79 05/21/2021    AMYLASE 44 08/16/2006    TSH 1.29 05/31/2024    T4 1.26 08/18/2023    T3 54 (L) 01/19/2016    CRP <2.9 04/26/2023    SED 8 08/07/2024       Anesthesia Plan    ASA Status:  2       Anesthesia Type: General.              Consents    Anesthesia Plan(s) and associated risks, benefits, and realistic alternatives discussed. Questions answered and patient/representative(s) expressed understanding.     - Discussed: Risks, Benefits and Alternatives for BOTH SEDATION and the PROCEDURE were discussed     - Discussed with:  Patient            Postoperative Care       PONV prophylaxis: Background Propofol Infusion     Comments:               RADHA Rodriguez CRNA    I have reviewed the pertinent notes and labs in the chart from the past 30 days and (re)examined the patient.  Any updates or changes from those notes are reflected in this note.

## 2024-08-13 ENCOUNTER — ANESTHESIA (OUTPATIENT)
Dept: GASTROENTEROLOGY | Facility: CLINIC | Age: 59
End: 2024-08-13
Payer: COMMERCIAL

## 2024-08-13 ENCOUNTER — HOSPITAL ENCOUNTER (OUTPATIENT)
Facility: CLINIC | Age: 59
Discharge: HOME | End: 2024-08-13
Attending: STUDENT IN AN ORGANIZED HEALTH CARE EDUCATION/TRAINING PROGRAM | Admitting: STUDENT IN AN ORGANIZED HEALTH CARE EDUCATION/TRAINING PROGRAM
Payer: COMMERCIAL

## 2024-08-13 VITALS
HEART RATE: 54 BPM | TEMPERATURE: 98.2 F | OXYGEN SATURATION: 98 % | DIASTOLIC BLOOD PRESSURE: 61 MMHG | SYSTOLIC BLOOD PRESSURE: 102 MMHG | RESPIRATION RATE: 12 BRPM

## 2024-08-13 LAB — COLONOSCOPY: NORMAL

## 2024-08-13 PROCEDURE — 250N000011 HC RX IP 250 OP 636: Performed by: NURSE ANESTHETIST, CERTIFIED REGISTERED

## 2024-08-13 PROCEDURE — 88305 TISSUE EXAM BY PATHOLOGIST: CPT | Mod: TC | Performed by: STUDENT IN AN ORGANIZED HEALTH CARE EDUCATION/TRAINING PROGRAM

## 2024-08-13 PROCEDURE — 258N000003 HC RX IP 258 OP 636: Performed by: PHYSICIAN ASSISTANT

## 2024-08-13 PROCEDURE — 370N000017 HC ANESTHESIA TECHNICAL FEE, PER MIN: Performed by: STUDENT IN AN ORGANIZED HEALTH CARE EDUCATION/TRAINING PROGRAM

## 2024-08-13 PROCEDURE — 45385 COLONOSCOPY W/LESION REMOVAL: CPT | Mod: PT | Performed by: STUDENT IN AN ORGANIZED HEALTH CARE EDUCATION/TRAINING PROGRAM

## 2024-08-13 PROCEDURE — 88305 TISSUE EXAM BY PATHOLOGIST: CPT | Mod: 26 | Performed by: PATHOLOGY

## 2024-08-13 PROCEDURE — 250N000009 HC RX 250: Performed by: PHYSICIAN ASSISTANT

## 2024-08-13 RX ORDER — LIDOCAINE 40 MG/G
CREAM TOPICAL
Status: DISCONTINUED | OUTPATIENT
Start: 2024-08-13 | End: 2024-08-13 | Stop reason: HOSPADM

## 2024-08-13 RX ORDER — PROPOFOL 10 MG/ML
INJECTION, EMULSION INTRAVENOUS PRN
Status: DISCONTINUED | OUTPATIENT
Start: 2024-08-13 | End: 2024-08-13

## 2024-08-13 RX ORDER — SODIUM CHLORIDE, SODIUM LACTATE, POTASSIUM CHLORIDE, CALCIUM CHLORIDE 600; 310; 30; 20 MG/100ML; MG/100ML; MG/100ML; MG/100ML
INJECTION, SOLUTION INTRAVENOUS CONTINUOUS
Status: DISCONTINUED | OUTPATIENT
Start: 2024-08-13 | End: 2024-08-13 | Stop reason: HOSPADM

## 2024-08-13 RX ADMIN — SODIUM CHLORIDE, POTASSIUM CHLORIDE, SODIUM LACTATE AND CALCIUM CHLORIDE: 600; 310; 30; 20 INJECTION, SOLUTION INTRAVENOUS at 11:15

## 2024-08-13 RX ADMIN — PROPOFOL 150 MG: 10 INJECTION, EMULSION INTRAVENOUS at 11:41

## 2024-08-13 RX ADMIN — PROPOFOL 150 MG: 10 INJECTION, EMULSION INTRAVENOUS at 11:40

## 2024-08-13 RX ADMIN — LIDOCAINE HYDROCHLORIDE 0.2 ML: 10 INJECTION, SOLUTION EPIDURAL; INFILTRATION; INTRACAUDAL; PERINEURAL at 11:16

## 2024-08-13 RX ADMIN — PROPOFOL 150 MG: 10 INJECTION, EMULSION INTRAVENOUS at 11:39

## 2024-08-13 ASSESSMENT — ACTIVITIES OF DAILY LIVING (ADL)
ADLS_ACUITY_SCORE: 35

## 2024-08-13 NOTE — ANESTHESIA POSTPROCEDURE EVALUATION
Patient: Tequila Neumann    Procedure: Procedure(s):  COLONOSCOPY, FLEXIBLE, WITH LESION REMOVAL USING SNARE       Anesthesia Type:  General    Note:  Disposition: Outpatient   Postop Pain Control: Uneventful            Sign Out: Well controlled pain   PONV: No   Neuro/Psych: Uneventful            Sign Out: Acceptable/Baseline neuro status   Airway/Respiratory: Uneventful            Sign Out: Acceptable/Baseline resp. status   CV/Hemodynamics: Uneventful            Sign Out: Acceptable CV status; No obvious hypovolemia; No obvious fluid overload   Other NRE: NONE   DID A NON-ROUTINE EVENT OCCUR? No           Last vitals:  Vitals Value Taken Time   /61 08/13/24 1215   Temp 36.8  C (98.2  F) 08/13/24 1215   Pulse 54 08/13/24 1215   Resp 12 08/13/24 1215   SpO2 98 % 08/13/24 1215       Electronically Signed By: RADHA Laboy CRNA  August 13, 2024  12:44 PM

## 2024-08-13 NOTE — LETTER
Tequila Neumann  [address] [address] [address]      August 15, 2024    Dear Tequila,  This letter is written to inform you of the results of your recent colonoscopy.  Your examination showed diverticulosis of your colon in ascending colon and polyp(s) in your rectum. All polyps were removed in their entirety and sent for review by a pathologist. As you will see on the pathology report below, the tissue(s) were hyperplastic polyps. Your examination was otherwise without abnormality.    Diverticulosis can be described as small outpouchings (pockets) in your colon wall. This is an entirely benign (non-cancerous) finding.  Hyperplastic polyps are entirely benign (non-cancerous) and rarely associated with the development of additional polyps or colorectal cancer.    Given these findings, I recommend that you undergo a repeat colonoscopy in ten years for screening. We will enter you into a recall system so you receive a reminder closer to the time that you are due for repeat examination. Your physician also recommends that you adhere to a high fiber diet indefinitely to promote colon health.     Please remember that this recommendation is made with the understanding that you are not experiencing persistent changes in bowel function, bleeding per rectum, and/or significant abdominal pain. If you experience these symptoms, please contact your primary care provider for a further evaluation.     If you have any questions or concerns about the results of your colonoscopy or the appropriate follow-up, please contact my assistant at [phone]    Sincerely,      Maikol Cartwright MD   [facility]  ___                    Resulted Orders   Surgical Pathology Exam   Result Value Ref Range    Case Report       Surgical Pathology Report                         Case: [id number]                                  Authorizing Provider:  Maikol Cartwright MD       Collected:           08/13/2024 11:56 AM         "  Ordering Location:     [facility]   Received:            08/13/2024 12:40 PM                                 [place]                                                                      Pathologist:           Caitlin Zaman MD                                                          Specimen:    Rectum                                                                                     Final Diagnosis       A. Colon, Rectum, polyp, polypectomy:  -Hyperplastic polyp  -Negative for dysplasia or malignancy.        Clinical Information       Procedure:  COLONOSCOPY, FLEXIBLE, WITH LESION REMOVAL USING SNARE  Pre-op Diagnosis: Screen for colon cancer [Z12.11]  Post-op Diagnosis: Z12.11 - Screen for colon cancer [ICD-10-CM]      Gross Description       A(1). Rectum, :  The specimen is received in formalin, labeled with the patient's name, medical record number and other identifying information designated \"rectum\". It consists of a single, 0.8 cm pale-tan soft tissue fragment (inked black) which is trisected and submitted entirely in 1 cassette.   (PARKER Tamez (ASCP) 8/14/2024 7:54 AM       Microscopic Description       Microscopic examination was performed.      Performing Labs       The technical component of this testing was completed at [facility].    Stain controls for all stains resulted within this report have been reviewed and show appropriate reactivity.       Case Images               "

## 2024-08-13 NOTE — H&P
[facility]    Pre-Endoscopy History and Physical     Tequila Neumann MRN# [id number]   YOB: 1965 Age: 59 year old     Date of Procedure: 8/13/2024  Primary care provider: Emma Beltran  Type of Endoscopy: Colonoscopy with possible biopsy, possible polypectomy  Reason for Procedure: screening  Type of Anesthesia Anticipated: Conscious Sedation    HPI:    Tequila is a 59 year old female who will be undergoing the above procedure.      A history and physical has been performed. The patient's medications and allergies have been reviewed. The risks and benefits of the procedure and the sedation options and risks were discussed with the patient.  All questions were answered and informed consent was obtained.      She denies a personal or family history of anesthesia complications or bleeding disorders.     Colonoscopy, last one 2014, screening. Normal then. ASA II. No AC. Surgical hx of lap sonja. No family hx of colon cancer, sister with colon polyps.    Patient Active Problem List   Diagnosis    ESSENTIAL TREMOR     Migraine    Hypothyroidism    Tobacco use disorder    RA (rheumatoid arthritis) (H)    CARDIOVASCULAR SCREENING; LDL GOAL LESS THAN 160    Hx of LASIK    Nutcracker esophagus    Insomnia    Cervical dystonia    Symptomatic menopausal or female climacteric states    ASCUS with positive high risk HPV cervical    Myofascial muscle pain    Seronegative rheumatoid arthritis (H)    Torticollis, spasmodic    Avoids wheat/gluten and refined sugar and feeling better    EGD suggestive of achalasia    Familial dystonia    Tobacco dependence due to cigarettes    Personal history of other diseases of the digestive system    Fibromyalgia    Major depressive disorder, single episode, moderate (H)    Essential tremor    Anxiety    Hyperlipidemia LDL goal <100    Dry eyes    Dystonia    Scoliosis deformity of spine        Past Medical History:   Diagnosis Date    Abnormal Pap  smear of cervix 1985    s/p LEEP    Abnormal Pap smear of cervix 03/16/2018    See problem list    Avoids wheat/gluten and refined sugar and feeling better 1/10/2019    Calculus of GB w/ other cystitis 1/3/2012    Cervical high risk HPV (human papillomavirus) test positive 03/16/2018    See problem list    EGD suggestive of achalasia 1/10/2019    Familial dystonia 1/10/2019    Fibromyalgia         Past Surgical History:   Procedure Laterality Date    ARTHROSCOPY KNEE RT/LT Left 12/08/2005    Left medial meniscal tear bilat    COLONOSCOPY  7/11/2014    Procedure: COLONOSCOPY;  Surgeon: Randell Mcgarry MD;  Location: [facility]    D & C  x 2    ESOPHAGOSCOPY, GASTROSCOPY, DUODENOSCOPY (EGD), COMBINED  7/16/2012    Procedure: COMBINED ESOPHAGOSCOPY, GASTROSCOPY, DUODENOSCOPY (EGD);;  Surgeon: Lito Kwon MD;  Location: [facility]    HC ESOPH/GAS REFLUX TEST W NASAL IMPED ELECTRODE  8/23/2012    Procedure: ESOPHAGEAL IMPEDENCE FUNCTION TEST 1 HOUR OR LESS;  Surgeon: Tequila Boone MD;  Location: [facility]    LAPAROSCOPIC CHOLECYSTECTOMY  1/25/2012    Procedure:LAPAROSCOPIC CHOLECYSTECTOMY; Laparoscopic vs Open Cholecystectomy; Surgeon:RANDELL MCGARRY; Location:[facility]    LASIK Bilateral 2000    LEEP TX, CERVICAL  1985    TUBAL LIGATION  1998       Social History     Tobacco Use    Smoking status: Every Day     Current packs/day: 0.50     Average packs/day: 0.5 packs/day for 30.0 years (15.0 ttl pk-yrs)     Types: Cigarettes    Smokeless tobacco: Never    Tobacco comments:     1 cigs daily    Substance Use Topics    Alcohol use: Yes     Comment: 3 drinks/week - helps        Family History   Problem Relation Age of Onset    C.A.D. Father     Diabetes Father     Hypertension Father     Cerebrovascular Disease Father     Lipids Father     Breast Cancer Maternal Grandmother         age 80+    Alzheimer Disease Maternal Grandmother         age 70+    Cancer Mother         sarcoma, small intestinal     Neurologic Disorder Mother         tremor    Tremor Mother     Dystonia Mother     Colon Polyps Sister     Other - See Comments Sister         no clear stroke    Dystonia Daughter     Cancer - colorectal No family hx of     Prostate Cancer No family hx of        Prior to Admission medications    Medication Sig Start Date End Date Taking? Authorizing Provider   Adalimumab-bwwd (HADLIMA PUSHTOUCH) 40 MG/0.4ML SOAJ Inject 40 mg Subcutaneous every 14 days . Hold during any infection and seek medical attention. 6/19/24   Nghia Patel MD   atorvastatin (LIPITOR) 10 MG tablet Take 1 tablet (10 mg) by mouth daily 7/1/24   Emma Beltran APRN CNP   celecoxib (CELEBREX) 100 MG capsule Take 1 capsule (100 mg) by mouth daily 5/31/24   Emma Beltran APRN CNP   cholecalciferol (VITAMIN D) 1000 UNIT tablet Take 1 tablet by mouth daily.    Reported, Patient   desonide (DESOWEN) 0.05 % external cream Apply topically 2 times daily 7/1/24   Emma Beltran APRN CNP   folic acid (FOLVITE) 1 MG tablet Take 1 tablet (1 mg) by mouth daily 11/1/23   Nghia Patel MD   hydrOXYzine HCl (ATARAX) 25 MG tablet Take 1-2 tablets (25-50 mg) by mouth daily as needed for anxiety or other (insomnia) 5/31/24   Emma Beltran APRN CNP   levothyroxine (SYNTHROID/LEVOTHROID) 137 MCG tablet Take 1 tablet (137 mcg) by mouth daily 8/1/24   Emma Beltran APRN CNP   methocarbamol (ROBAXIN) 500 MG tablet 1-2 x 500mg tab by mouth nightly as needed 5/31/24   Emma Beltran APRN CNP   methotrexate 2.5 MG tablet Take 8 tablets (20 mg) by mouth once a week . This is a once weekly medication, taken on the same day of each week. 8/8/24   Nghia Patel MD   propranolol (INDERAL) 60 MG tablet Take 1 tablet (60 mg) by mouth 2 times daily 5/31/24   Emma Beltran APRN CNP       Allergies   Allergen Reactions    Primidone      Made her feel strange and out of it    Sulfa Antibiotics Nausea     "Topamax      Personality change; memory issues    Topiramate Other (See Comments)     Personality change. Memory   Personality change; memory issues    Plaquenil [Hydroxychloroquine Sulfate] Rash        REVIEW OF SYSTEMS:   5 point ROS negative except as noted above in HPI, including Gen., Resp., CV, GI &  system review.    PHYSICAL EXAM:   BP (P) 117/81   Temp (P) 98  F (36.7  C) (Oral)   Resp (P) 16   Ht (P) 1.626 m (5' 4\")   Wt (P) 72.6 kg (160 lb)   LMP 02/20/2015 (Exact Date)   SpO2 (P) 96%   BMI (P) 27.46 kg/m   Estimated body mass index is 27.46 kg/m  (pended) as calculated from the following:    Height as of this encounter: (P) 1.626 m (5' 4\").    Weight as of this encounter: (P) 72.6 kg (160 lb).   Constitutional: Awake, alert, no acute distress.  Eyes: No scleral icterus.  Conjunctiva are without injection.  ENMT: Mucous membranes moist, dentition and gums are intact.   Neck: Soft, supple, trachea midline.    Endocrine: n/a   Lymphatic: There is no cervical, submandibularadenopathy.  Respiratory: normal efforts on room air  Cardiovascular: extremities warm and well perfused  Abdomen: Non-distended, non-tender,  No masses,  Musculoskeletal: Full range of motion in the upper and lower extremities.    Skin: No skin rashes or lesions to inspection.  No petechia.    Neurologic: alerted and oriented 3x  Psychiatric: The patient's affect is not blunted and mood is appropriate.  DIAGNOSTICS:    Not indicated    IMPRESSION   ASA Class 2 - Mild systemic disease    PLAN:   Plan for Colonoscopy with possible biopsy, possible polypectomy. We discussed the risks, benefits and alternatives and the patient wished to proceed.  Patient is cleared for the above procedure.    The above has been forwarded to the consulting provider.    Maikol Cartwright MD on 8/13/2024 at 10:57 AM  [facility]        "

## 2024-08-13 NOTE — ANESTHESIA CARE TRANSFER NOTE
Patient: Tequila Neumann    Procedure: Procedure(s):  COLONOSCOPY, FLEXIBLE, WITH LESION REMOVAL USING SNARE       Diagnosis: Screen for colon cancer [Z12.11]  Diagnosis Additional Information: No value filed.    Anesthesia Type:   General     Note:    Oropharynx: oropharynx clear of all foreign objects and spontaneously breathing  Level of Consciousness: awake  Oxygen Supplementation: room air    Independent Airway: airway patency satisfactory and stable  Dentition: dentition unchanged  Vital Signs Stable: post-procedure vital signs reviewed and stable  Report to RN Given: handoff report given  Patient transferred to: Phase II    Handoff Report: Identifed the Patient, Identified the Reponsible Provider, Reviewed the pertinent medical history, Discussed the surgical course, Reviewed Intra-OP anesthesia mangement and issues during anesthesia, Set expectations for post-procedure period and Allowed opportunity for questions and acknowledgement of understanding      Vitals:  Vitals Value Taken Time   BP     Temp     Pulse     Resp     SpO2         Electronically Signed By: RADHA Laboy CRNA  August 13, 2024  11:59 AM

## 2024-08-13 NOTE — PROGRESS NOTES
[facility] DISCHARGE NOTE    Patient discharged to home at 12:24 PM via ambulation. Accompanied by other:friend and staff. Discharge instructions reviewed with patient and other, opportunity offered to ask questions. Prescriptions - None ordered for discharge. All belongings sent with patient.    Radha Medrano RN

## 2024-08-15 LAB
PATH REPORT.COMMENTS IMP SPEC: NORMAL
PATH REPORT.COMMENTS IMP SPEC: NORMAL
PATH REPORT.FINAL DX SPEC: NORMAL
PATH REPORT.GROSS SPEC: NORMAL
PATH REPORT.MICROSCOPIC SPEC OTHER STN: NORMAL
PATH REPORT.RELEVANT HX SPEC: NORMAL
PHOTO IMAGE: NORMAL

## 2024-08-21 ENCOUNTER — TELEPHONE (OUTPATIENT)
Dept: FAMILY MEDICINE | Facility: CLINIC | Age: 59
End: 2024-08-21
Payer: COMMERCIAL

## 2024-08-21 NOTE — TELEPHONE ENCOUNTER
Patient Quality Outreach    Patient is due for the following:   Breast Cancer Screening - Mammogram    Next Steps:   Schedule colonoscopy     Type of outreach:    Sent letter.    Next Steps:  Reach out within 90 days via Letter.    Max number of attempts reached: No. Will try again in 90 days if patient still on fail list.    Questions for provider review:    None           Zainab Garcia CMA  Chart routed to none.

## 2024-08-21 NOTE — LETTER
August 21, 2024    To  Tequila Neumann  [address] [address] [place] [address]    Your team at [facility] cares about your health. We have reviewed your chart and based on our findings; we are making the following recommendations to better manage your health.     You are in particular need of attention regarding the following:     Schedule Annual MAMMOGRAPHY. [facility] scheduling number is [phone] or schedule in [facility] (self referral).    If you have already completed these items, please contact the clinic via phone or   [facility] so your care team can review and update your records. Thank you for   choosing [facility] for your healthcare needs. For any questions,   concerns, or to schedule an appointment please contact our clinic.    Healthy Regards,      Your [facility] Care Team

## 2024-09-13 DIAGNOSIS — E89.0 POSTABLATIVE HYPOTHYROIDISM: ICD-10-CM

## 2024-09-13 NOTE — TELEPHONE ENCOUNTER
No care due was identified.  [facility] Embedded Care Due Messages. Reference number: [id number]. 9/13/2024 12:28:12 PM MDT

## 2024-09-16 RX ORDER — LEVOTHYROXINE SODIUM 137 UG/1
137 TABLET ORAL DAILY
Qty: 90 TABLET | Refills: 0 | OUTPATIENT
Start: 2024-09-16

## 2024-09-17 ENCOUNTER — OFFICE VISIT (OUTPATIENT)
Dept: FAMILY MEDICINE | Facility: CLINIC | Age: 59
End: 2024-09-17
Payer: COMMERCIAL

## 2024-09-17 VITALS
HEART RATE: 74 BPM | DIASTOLIC BLOOD PRESSURE: 70 MMHG | SYSTOLIC BLOOD PRESSURE: 119 MMHG | TEMPERATURE: 97.1 F | BODY MASS INDEX: 25.37 KG/M2 | WEIGHT: 157.2 LBS | OXYGEN SATURATION: 93 %

## 2024-09-17 DIAGNOSIS — E89.0 POSTABLATIVE HYPOTHYROIDISM: Primary | ICD-10-CM

## 2024-09-17 PROCEDURE — 99213 OFFICE O/P EST LOW 20 MIN: CPT | Performed by: PHYSICIAN ASSISTANT

## 2024-09-17 RX ORDER — ADALIMUMAB-BWWD 40 MG/.4ML
40 SOLUTION SUBCUTANEOUS
COMMUNITY

## 2024-09-17 RX ORDER — METHOCARBAMOL 500 MG/1
TABLET, FILM COATED ORAL
COMMUNITY
Start: 2024-05-31

## 2024-09-17 RX ORDER — HYDROXYZINE HYDROCHLORIDE 25 MG/1
25-50 TABLET, FILM COATED ORAL
COMMUNITY
Start: 2024-05-31

## 2024-09-17 RX ORDER — LEVOTHYROXINE SODIUM 137 UG/1
137 TABLET ORAL DAILY
Qty: 90 TABLET | Refills: 2 | Status: SHIPPED | OUTPATIENT
Start: 2024-09-17

## 2024-09-17 RX ORDER — DESONIDE 0.5 MG/G
CREAM TOPICAL 2 TIMES DAILY
COMMUNITY
Start: 2024-07-01

## 2024-09-17 RX ORDER — ATORVASTATIN CALCIUM 10 MG/1
10 TABLET, FILM COATED ORAL DAILY
COMMUNITY
Start: 2024-07-01

## 2024-09-17 NOTE — PROGRESS NOTES
Subjective      Dana Mac is a 59 y.o. female who presents for med refills.    Vaishali comes in today for a refill of her levothyroxine.  She had a physical and colonoscopy done earlier this year.  Her last TSH was 1.29 in May.        The following have been reviewed and updated as appropriate in this visit:   Tobacco  Allergies  Meds  Med Hx  Surg Hx  Fam Hx         Allergies   Allergen Reactions    Primidone Other (see comments)     Made her feel strange and out of it  Made her feel strange and out of it      Sulfa (Sulfonamide Antibiotics) GI intolerance    Hydroxychloroquine Sulfate Rash     Current Outpatient Medications   Medication Sig Dispense Refill    Hadlima,CF, PushTouch 40 mg/0.4 mL auto-injector Inject 0.4 mL (40 mg total) under the skin Every two weeks      atorvastatin (LIPITOR) 10 mg tablet Take 1 tablet (10 mg total) by mouth daily      desonide (DESOWEN) 0.05 % cream Apply topically 2 times daily As needed      hydrOXYzine (ATARAX) 25 mg tablet Take 1-2 tablets (25-50 mg total) by mouth      methocarbamoL (ROBAXIN) 500 mg tablet PRN      celecoxib (CeleBREX) 100 mg capsule Take 1 capsule (100 mg total) by mouth daily Taking as needed for pain      cholecalciferol, vitamin D3, 25 mcg (1,000 unit) tablet Take 1 tablet (1,000 Units total) by mouth daily      folic acid 1 mg tablet Take 1 tablet (1 mg total) by mouth daily      methotrexate 2.5 mg tablet Take 8 tablets (20 mg total) by mouth once a week      propranoloL (INDERAL) 60 mg tablet Take 1 tablet (60 mg total) by mouth 2 (two) times a day Takes when needed      levothyroxine (SYNTHROID, LEVOTHROID) 137 mcg tablet Take 137 mcg by mouth daily 90 tablet 2    adalimumab (HUMIRA) 40 mg/0.4 mL pen injector kit (citrate-free) Inject 0.4 mL (40 mg total) under the skin       No current facility-administered medications for this visit.     Past Medical History:   Diagnosis Date    Disease of thyroid gland     Grave's disease    Rheumatoid  arthritis (CMS/HCC)      Past Surgical History:   Procedure Laterality Date    CHOLECYSTECTOMY       Family History   Problem Relation Age of Onset    Cancer Mother         sarcoma of intestine    Hypertension Father     Hyperlipidemia Father     Heart attack Father     Heart failure Father     No Known Problems Sister     Tremor Daughter     No Known Problems Daughter     Breast cancer Maternal Grandmother 98    Stroke Paternal Grandmother     Cancer Paternal Grandfather      Social History     Socioeconomic History    Marital status: Unknown   Tobacco Use    Smoking status: Former     Current packs/day: 0.00     Average packs/day: 0.5 packs/day for 30.0 years (15.0 ttl pk-yrs)     Types: Cigarettes     Start date: 1993     Quit date: 2023     Years since quittin.6    Smokeless tobacco: Never   Vaping Use    Vaping status: Never Used   Substance and Sexual Activity    Alcohol use: Yes     Comment: occasional    Drug use: Never     Social Determinants of Health     Tobacco Use: Medium Risk (2024)    Patient History     Smoking Tobacco Use: Former     Smokeless Tobacco Use: Never   Alcohol Use: Not At Risk (7/15/2021)    [facility]    AUDIT-C     Frequency of Alcohol Consumption: 2-4 times a month     Average Number of Drinks: 1 or 2     Frequency of Binge Drinking: Never   Financial Resource Strain: Low Risk  (2024)    Received from [facility]    Financial Resource Strain     Within the past 12 months, have you or your family members you live with been unable to get utilities (heat, electricity) when it was really needed?: No   Food Insecurity: Low Risk  (2024)    Received from [facility]    Food Insecurity     Within the past 12 months, did you worry that your food would run out before you got money to buy more?: No     Within the past 12 months, did the food you bought just not last and you didn’t have money to get more?: No   Transportation Needs: Low Risk  (2024)     Received from [facility]    Transportation Needs     Within the past 12 months, has lack of transportation kept you from medical appointments, getting your medicines, non-medical meetings or appointments, work, or from getting things that you need?: No   Physical Activity: Insufficiently Active (5/31/2024)    Received from [facility]    Exercise Vital Sign     Days of Exercise per Week: 3 days     Minutes of Exercise per Session: 40 min   Stress: Stress Concern Present (5/31/2024)    Received from [facility]    [facility] - Occupational Stress Questionnaire     Feeling of Stress : To some extent   Social Connections: Unknown (5/31/2024)    Received from [facility]    Social Connection and Isolation Panel [NHANES]     Frequency of Social Gatherings with Friends and Family: Twice a week   Depression: Not at risk (5/31/2024)    Received from [facility]    PHQ-2     PHQ-2 Score: 2   Housing Stability: Low Risk  (5/31/2024)    Received from [facility]    Housing Stability     Do you have housing? (Housing is defined as stable permanent housing and does not include staying ouside in a car, in a tent, in an abandoned building, in an overnight shelter, or couch-surfing.): Yes     Are you worried about losing your housing?: No       Review of Systems    Objective   /70 (BP Location: Left arm, Patient Position: Sitting, Cuff Size: Regular Adult)   Pulse 74   Temp 36.2 °C (97.1 °F) (Temporal)   Wt 71.3 kg (157 lb 3.2 oz)   SpO2 93%   BMI 25.37 kg/m²     Physical Exam  Vitals and nursing note reviewed.   Constitutional:       Appearance: Normal appearance.   HENT:      Right Ear: Tympanic membrane and ear canal normal.      Left Ear: Tympanic membrane and ear canal normal.      Mouth/Throat:      Mouth: Mucous membranes are moist.      Pharynx: Oropharynx is clear.   Neck:      Thyroid: No thyromegaly.   Cardiovascular:      Rate and Rhythm: Normal rate and regular rhythm.      Heart sounds: Normal  heart sounds, S1 normal and S2 normal.   Pulmonary:      Effort: Pulmonary effort is normal.      Breath sounds: Normal breath sounds.   Musculoskeletal:      Cervical back: Torticollis present.   Lymphadenopathy:      Cervical: No cervical adenopathy.   Skin:     General: Skin is warm.   Neurological:      General: No focal deficit present.      Mental Status: She is alert and oriented to person, place, and time.         ASSESSMENT AND PLAN   Diagnoses and all orders for this visit:    Postablative hypothyroidism  -     levothyroxine (SYNTHROID, LEVOTHROID) 137 mcg tablet; Take 137 mcg by mouth daily    Her last two TSH checks were normal so that is not repeated today.  She will be due for a new lab in May 2025.  She will return earlier if she develops symptoms of hypothyroidism.  F/u otherwise prn.    KARTHIK Cheng

## 2024-09-20 ENCOUNTER — MYC MEDICAL ADVICE (OUTPATIENT)
Dept: RHEUMATOLOGY | Facility: CLINIC | Age: 59
End: 2024-09-20
Payer: COMMERCIAL

## 2024-09-20 DIAGNOSIS — M06.09 RHEUMATOID ARTHRITIS OF MULTIPLE SITES WITH NEGATIVE RHEUMATOID FACTOR (H): ICD-10-CM

## 2024-09-23 RX ORDER — METHOTREXATE 2.5 MG/1
20 TABLET ORAL WEEKLY
Qty: 104 TABLET | Refills: 1 | Status: SHIPPED | OUTPATIENT
Start: 2024-09-23

## 2024-09-23 NOTE — TELEPHONE ENCOUNTER
Pt wanted to use a different pharmacy. This was done after confirming she has not picked up with [facility] in [place]    JAYLEEN FigueroaN RN 9/23/2024 9:14 AM

## 2024-09-25 DIAGNOSIS — G47.00 INSOMNIA, UNSPECIFIED TYPE: ICD-10-CM

## 2024-09-25 DIAGNOSIS — G25.0 ESSENTIAL TREMOR: ICD-10-CM

## 2024-09-25 NOTE — TELEPHONE ENCOUNTER
Pending Prescriptions:                       Disp   Refills    hydrOXYzine HCl (ATARAX) 25 MG tablet     90 tab*3            Sig: Take 1-2 tablets (25-50 mg) by mouth daily as           needed for anxiety or other (insomnia).    propranolol (INDERAL) 60 MG tablet        180 ta*3            Sig: Take 1 tablet (60 mg) by mouth 2 times daily.

## 2024-09-26 RX ORDER — PROPRANOLOL HYDROCHLORIDE 60 MG/1
1 TABLET ORAL 2 TIMES DAILY
Qty: 180 TABLET | Refills: 3 | Status: SHIPPED | OUTPATIENT
Start: 2024-09-26

## 2024-09-26 RX ORDER — HYDROXYZINE HYDROCHLORIDE 25 MG/1
25-50 TABLET, FILM COATED ORAL DAILY PRN
Qty: 90 TABLET | Refills: 3 | OUTPATIENT
Start: 2024-09-26

## 2024-09-26 NOTE — TELEPHONE ENCOUNTER
Pending Prescriptions:                       Disp   Refills    propranolol (INDERAL) 60 MG tablet        180 ta*3            Sig: Take 1 tablet (60 mg) by mouth 2 times daily.  Refused Prescriptions:                       Disp   Refills    hydrOXYzine HCl (ATARAX) 25 MG tablet      90 tab*3        Sig: Take 1-2 tablets (25-50 mg) by mouth daily as needed           for anxiety or other (insomnia).  Refused By: VANESA RAMIREZ  Reason for Refusal: Should already have refills on file    Routing refill request to provider for review/approval because:  Medication indicated for associated diagnosis       Vanesa Ramirez, RN

## 2024-09-29 NOTE — PATIENT INSTRUCTIONS
RHEUMATOLOGY    [facility]  [address]  [place] [address]    Phone number: [phone]  Fax number: [phone]    If you need a medication refill, please contact us as you may need lab work and/or a follow up visit prior to your refill.      Thank you for choosing [facility]!                             65 y/o F w/ PMHx irritable bowel syndrome, osteoporosis with severe kyphosis, depression with hx suicide attempts presenting s/p suicide attempt 2 nights ago. Patient states that 2 nights ago she took a combination of pills including Effexor, Cymbalta, doxepin, Percocet, Klonopin with the intention of killing herself.  Patient states that she has no quality of life anymore secondary to her IBS diarrhea and severe chronic back pain.  Patient states that she can no longer live with this pain. When asked if she is still feels that she does not want to live she states that she wants to die even more now. She endorses diarrhea similar to her chronic IBS and chronic severe back pain. No fevers, chest pain, SOB, abd pain, dysuria, numbness/tingling, homicidal ideations, thoughts of harming others, visual or auditory hallucinations. Seen for suicide attempt with similar story in 2023. 65 y/o F w/ PMHx irritable bowel syndrome, osteoporosis with severe kyphosis, depression with hx suicide attempts presenting s/p suicide attempt 2 nights ago. Patient states that 2 nights ago she took a combination of pills including Effexor, Cymbalta, doxepin, Percocet, Klonopin with the intention of killing herself.  Patient states that she has no quality of life anymore secondary to her IBS diarrhea and severe chronic back pain. Back pain not controlled on percocet, oxy, and lyrica. Patient states that she can no longer live with this pain. When asked if she is still feels that she does not want to live she states that she wants to die even more now. She endorses diarrhea similar to her chronic IBS and chronic severe back pain. No fevers, chest pain, SOB, abd pain, dysuria, numbness/tingling, homicidal ideations, thoughts of harming others, visual or auditory hallucinations. Seen for suicide attempt with similar story in 2023. 65 y/o F w/ PMHx irritable bowel syndrome, osteoporosis with severe kyphosis, depression with hx suicide attempts presenting s/p suicide attempt 2 nights ago. Patient states that 2 nights ago she took a combination of pills including Effexor, Cymbalta, doxepin, Percocet, Klonopin with the intention of killing herself.  Patient states that she has no quality of life anymore secondary to her IBS diarrhea and severe chronic back pain. Back pain not controlled on percocet, oxy, and lyrica. Patient states that she can no longer live with this pain. When asked if she is still feels that she does not want to live she states that she wants to die even more now. She endorses diarrhea similar to her chronic IBS and chronic severe back pain. No fevers, chest pain, SOB, abd pain, dysuria, numbness/tingling, homicidal ideations, thoughts of harming others, visual or auditory hallucinations. Seen for suicide attempt with similar story in 2023. Pt sees psychiatrist Dr. Puente, does not know last name or affiliation. 65 y/o F w/ PMHx irritable bowel syndrome, osteoporosis with severe kyphosis, depression with hx suicide attempts presenting s/p suicide attempt 2 nights ago. Patient states that 2 nights ago she took a combination of pills including 1 each of Effexor, Cymbalta, doxepin, Percocet, Klonopin with the intention of killing herself.  Patient states that she has no quality of life anymore secondary to her IBS diarrhea and severe chronic back pain. Back pain not controlled on percocet, oxy, and lyrica. Patient states that she can no longer live with this pain. When asked if she is still feels that she does not want to live she states that she wants to die even more now than before taking pills. She endorses diarrhea similar to her chronic IBS and chronic severe back pain. No fevers, chest pain, SOB, abd pain, dysuria, numbness/tingling, homicidal ideations, thoughts of harming others, visual or auditory hallucinations. Seen for suicide attempt with similar story in 2023. Pt sees psychiatrist Dr. Puente, does not know last name or affiliation.

## 2024-10-07 ENCOUNTER — MYC MEDICAL ADVICE (OUTPATIENT)
Dept: FAMILY MEDICINE | Facility: CLINIC | Age: 59
End: 2024-10-07
Payer: COMMERCIAL

## 2024-10-07 DIAGNOSIS — G47.00 INSOMNIA, UNSPECIFIED TYPE: ICD-10-CM

## 2024-10-07 DIAGNOSIS — E78.5 HYPERLIPIDEMIA LDL GOAL <100: ICD-10-CM

## 2024-10-07 RX ORDER — ATORVASTATIN CALCIUM 10 MG/1
10 TABLET, FILM COATED ORAL DAILY
Qty: 90 TABLET | Refills: 0 | Status: SHIPPED | OUTPATIENT
Start: 2024-10-07

## 2024-10-07 RX ORDER — HYDROXYZINE HYDROCHLORIDE 25 MG/1
25-50 TABLET, FILM COATED ORAL DAILY PRN
Qty: 180 TABLET | Refills: 0 | Status: SHIPPED | OUTPATIENT
Start: 2024-10-07

## 2024-10-16 ENCOUNTER — HOSPITAL ENCOUNTER (OUTPATIENT)
Dept: MAMMOGRAPHY | Facility: HOSPITAL | Age: 59
Discharge: HOME | End: 2024-10-16
Payer: COMMERCIAL

## 2024-10-16 DIAGNOSIS — Z12.31 ENCOUNTER FOR SCREENING MAMMOGRAM FOR MALIGNANT NEOPLASM OF BREAST: ICD-10-CM

## 2024-10-16 PROCEDURE — 77063 BREAST TOMOSYNTHESIS BI: CPT

## 2024-10-23 PROCEDURE — 77067 SCR MAMMO BI INCL CAD: CPT | Mod: 26 | Performed by: INTERNAL MEDICINE

## 2024-10-23 PROCEDURE — 77063 BREAST TOMOSYNTHESIS BI: CPT | Mod: 26 | Performed by: INTERNAL MEDICINE

## 2024-11-02 ENCOUNTER — MYC MEDICAL ADVICE (OUTPATIENT)
Dept: RHEUMATOLOGY | Facility: CLINIC | Age: 59
End: 2024-11-02
Payer: COMMERCIAL

## 2024-11-02 DIAGNOSIS — Z79.899 HIGH RISK MEDICATION USE: ICD-10-CM

## 2024-11-02 DIAGNOSIS — M06.09 RHEUMATOID ARTHRITIS OF MULTIPLE SITES WITH NEGATIVE RHEUMATOID FACTOR (H): Primary | ICD-10-CM

## 2024-11-05 NOTE — TELEPHONE ENCOUNTER
RN: Please assist Tequila Neumann with scheduling a lab appointment for when she returns to [place].  Needs to be seen at least once year.  Also please mail her standing lab orders, for labs to be completed now and every 8-12 weeks.     - Labs every 8-12 weeks: CBC, Cr, Hepatic Panel (printed)    Nghia Patel MD  11/5/2024

## 2024-11-16 DIAGNOSIS — E78.5 HYPERLIPIDEMIA LDL GOAL <100: ICD-10-CM

## 2024-11-16 DIAGNOSIS — G47.00 INSOMNIA, UNSPECIFIED TYPE: ICD-10-CM

## 2024-11-18 RX ORDER — ATORVASTATIN CALCIUM 10 MG/1
10 TABLET, FILM COATED ORAL DAILY
Qty: 90 TABLET | Refills: 0 | Status: SHIPPED | OUTPATIENT
Start: 2024-11-18

## 2024-11-18 RX ORDER — HYDROXYZINE HYDROCHLORIDE 25 MG/1
TABLET, FILM COATED ORAL
Qty: 180 TABLET | Refills: 0 | Status: SHIPPED | OUTPATIENT
Start: 2024-11-18

## 2024-11-22 ENCOUNTER — TRANSFERRED RECORDS (OUTPATIENT)
Dept: HEALTH INFORMATION MANAGEMENT | Facility: CLINIC | Age: 59
End: 2024-11-22
Payer: COMMERCIAL

## 2024-12-07 ENCOUNTER — MYC MEDICAL ADVICE (OUTPATIENT)
Dept: RHEUMATOLOGY | Facility: CLINIC | Age: 59
End: 2024-12-07
Payer: COMMERCIAL

## 2024-12-09 NOTE — TELEPHONE ENCOUNTER
Marquita: Please see the [employer] messages.  Needs to stay on adalimumab.    Nghia Patel MD  12/9/2024

## 2024-12-11 NOTE — TELEPHONE ENCOUNTER
Patient stating insurance is no longer going to cover Hadlima in 2025.     Benefit investigation - [place] 2025 Formulary:  [email or web address]-commercial-[email or web address]-group-[email or web address]    *Per 2025 formulary Hadlima is preferred bio similar    PA Initiation    Medication: HADLIMA PUSHTOUCH 40 MG/0.4ML SC SOAJ  Insurance Company: [employer] - Phone [phone] Fax [phone]  Pharmacy Filling the Rx: [facility] - [place], [place] - [address]  Filling Pharmacy Phone: [phone]  Filling Pharmacy Fax: [phone]  Start Date: 12/11/2024  [id number]        Thank You,     Marquita Zaldivar [facility]  Specialty Pharmacy Clinic [facility]  [email or web address]  [email or web address]  Phone: [phone]  Fax: [phone]

## 2024-12-11 NOTE — TELEPHONE ENCOUNTER
Prior Authorization Not Needed per Insurance    Medication: HADLIMA PUSHTOUCH 40 MG/0.4ML SC SOAJ  Insurance Company: [employer] - Phone [phone] Fax [phone]  Expected CoPay: $    Pharmacy Filling the Rx: [facility] - [place], [place] - [address] [place] [address]  Pharmacy Notified: Not needed  Patient Notified: Yes - Called patient to explain. Left voicemail that I will sending a [employer] message.        Per insurance, patient received letter stating that Humira will not be on formulary start 2025 and that Hadlima will be preferred.     Called patient to explain. Left voicemail that I will sending a [employer] message.    Thank You,     Marquita Zaldivar [facility]  Specialty Pharmacy Clinic [facility]  [email or web address]  [email or web address]  Phone: [phone]  Fax: [phone]

## 2024-12-12 ENCOUNTER — TELEPHONE (OUTPATIENT)
Dept: FAMILY MEDICINE | Facility: CLINIC | Age: 59
End: 2024-12-12
Payer: COMMERCIAL

## 2024-12-12 NOTE — TELEPHONE ENCOUNTER
Patient Quality Outreach    Patient is due for the following:   Breast Cancer Screening - Mammogram      Type of outreach:    Sent letter.    Questions for provider review:    None           Zainab Garcia CMA  Chart routed to none.

## 2024-12-12 NOTE — LETTER
December 12, 2024    To  Tequila [place]  [address] [place] [place] [address]    Your team at [facility] cares about your health. We have reviewed your chart and based on our findings; we are making the following recommendations to better manage your health.     You are in particular need of attention regarding the following:     Schedule Annual MAMMOGRAPHY. [facility] scheduling number is [phone] or schedule in Hotlist[employer] (self referral).    If you have already completed these items, please contact the clinic via phone or   Hotlisthart so your care team can review and update your records. Thank you for   choosing [facility]ics for your healthcare needs. For any questions,   concerns, or to schedule an appointment please contact our clinic.    Healthy Regards,      Your [facility] Care Team

## 2024-12-14 DIAGNOSIS — G47.00 INSOMNIA, UNSPECIFIED TYPE: ICD-10-CM

## 2024-12-16 RX ORDER — HYDROXYZINE HYDROCHLORIDE 25 MG/1
TABLET, FILM COATED ORAL
Qty: 60 TABLET | Refills: 2 | Status: SHIPPED | OUTPATIENT
Start: 2024-12-16

## 2025-02-11 ENCOUNTER — TELEPHONE (OUTPATIENT)
Dept: RHEUMATOLOGY | Facility: CLINIC | Age: 60
End: 2025-02-11
Payer: COMMERCIAL

## 2025-02-11 NOTE — TELEPHONE ENCOUNTER
PA Initiation    Medication: AMJEVITA 40 MG/0.4ML SC SOAJ  Insurance Company: Comment:  [facility]  Pharmacy Filling the Rx: [id number] - [place], [place] - [address] [place] [address]  Filling Pharmacy Phone: [phone]  Filling Pharmacy Fax:    Start Date: 2/11/2025  JADA (Key: [id number])  PA Case ID #: [id number]        Thank You,     Marquita Zaldivar [facility]  Specialty Pharmacy Clinic [facility]  [email or web address]  [email or web address]  Phone: [phone]  Fax: [phone]     Patient/Caregiver provided printed discharge information.

## 2025-02-14 ENCOUNTER — MYC MEDICAL ADVICE (OUTPATIENT)
Dept: FAMILY MEDICINE | Facility: CLINIC | Age: 60
End: 2025-02-14
Payer: COMMERCIAL

## 2025-02-14 DIAGNOSIS — G47.00 INSOMNIA, UNSPECIFIED TYPE: ICD-10-CM

## 2025-02-14 DIAGNOSIS — G25.0 ESSENTIAL TREMOR: ICD-10-CM

## 2025-02-14 DIAGNOSIS — M06.00 SERONEGATIVE RHEUMATOID ARTHRITIS (H): ICD-10-CM

## 2025-02-14 DIAGNOSIS — E03.9 HYPOTHYROIDISM, UNSPECIFIED TYPE: ICD-10-CM

## 2025-02-14 NOTE — TELEPHONE ENCOUNTER
Last Written Hydroxyzine Prescription Date:  12-   Last Fill Quantity: 60,  # refills:  2  Last office visit: 5/31/2024 ; last virtual visit: Visit date not found with prescribing provider:     Future Office Visit:  Not scheduled at this time  ~~~~~~~~~~~~~~~~~~~~~~~~~~~~~~~~    Last Written Propranolol Prescription Date:9-26-24  Last Fill Quantity: 180 ,  # refills:  3  Last office visit: 5/31/2024 ; last virtual visit: Visit date not found with prescribing provider:     Future Office Visit:  Not scheduled at this time    ~~~~~~~~~~~~~~~~~~~~~~~~~~~~~~~~~  Last Written Levothyroxine Prescription Date: 08-    Last Fill Quantity: 90,  # refills:  3  Last office visit: 5/31/2024 ; last virtual visit: Visit date not found with prescribing provider:     Future Office Visit:  Not scheduled at this time  ~~~~~~~~~~~~~~~~~~~~~~~~~~~~~~~~~  Last Written Celecoxib Prescription Date:5-  Last Fill Quantity: 90,  # refills:  3  Last office visit: 5/31/2024 ; last virtual visit: Visit date not found with prescribing provider:     Future Office Visit:  Not scheduled at this time            equested Prescriptions   Pending Prescriptions Disp Refills    hydrOXYzine HCl (ATARAX) 25 MG tablet 180 tablet 0     Sig: Take 1 to 2 tablets by mouth daily as needed for Anxiety or Insomnia       There is no refill protocol information for this order       propranolol (INDERAL) 60 MG tablet 180 tablet 0     Sig: Take 1 tablet (60 mg) by mouth 2 times daily.       There is no refill protocol information for this order       levothyroxine (SYNTHROID/LEVOTHROID) 137 MCG tablet 90 tablet 0     Sig: Take 1 tablet (137 mcg) by mouth daily.       There is no refill protocol information for this order       celecoxib (CELEBREX) 100 MG capsule 90 capsule 0     Sig: Take 1 capsule (100 mg) by mouth daily.       There is no refill protocol information for this order

## 2025-02-17 RX ORDER — PROPRANOLOL HYDROCHLORIDE 60 MG/1
1 TABLET ORAL 2 TIMES DAILY
Qty: 180 TABLET | Refills: 0 | Status: SHIPPED | OUTPATIENT
Start: 2025-02-17

## 2025-02-17 RX ORDER — LEVOTHYROXINE SODIUM 137 UG/1
137 TABLET ORAL DAILY
Qty: 90 TABLET | Refills: 0 | Status: SHIPPED | OUTPATIENT
Start: 2025-02-17

## 2025-02-17 RX ORDER — CELECOXIB 100 MG/1
100 CAPSULE ORAL DAILY
Qty: 90 CAPSULE | Refills: 0 | Status: SHIPPED | OUTPATIENT
Start: 2025-02-17

## 2025-02-17 RX ORDER — HYDROXYZINE HYDROCHLORIDE 25 MG/1
TABLET, FILM COATED ORAL
Qty: 180 TABLET | Refills: 0 | Status: SHIPPED | OUTPATIENT
Start: 2025-02-17

## 2025-02-17 NOTE — TELEPHONE ENCOUNTER
Pending Prescriptions:                       Disp   Refills    propranolol (INDERAL) 60 MG tablet         180 ta*0        Sig: Take 1 tablet (60 mg) by mouth 2 times daily.    celecoxib (CELEBREX) 100 MG capsule        90 cap*0        Sig: Take 1 capsule (100 mg) by mouth daily.    Signed Prescriptions:                        Disp   Refills    hydrOXYzine HCl (ATARAX) 25 MG tablet      180 ta*0        Sig: Take 1 to 2 tablets by mouth daily as needed for           Anxiety or Insomnia  Authorizing Provider: ELIDA AGUILERA  Ordering User: ZION PIERSON    levothyroxine (SYNTHROID/LEVOTHROID) 137 M*90 tab*0        Sig: Take 1 tablet (137 mcg) by mouth daily.  Authorizing Provider: ELIDA AGUILERA  Ordering User: ZION PIERSON    Routing refill request to provider for review/approval because:  Changing to mail-order pharmacy, needs to have new scripts sent.    Requested Prescriptions   Pending Prescriptions Disp Refills    propranolol (INDERAL) 60 MG tablet 180 tablet 0     Sig: Take 1 tablet (60 mg) by mouth 2 times daily.       Beta-Blockers Protocol Failed - 2/17/2025 11:01 AM        Failed - Medication indicated for associated diagnosis     Medication is associated with one or more of the following diagnoses:     Hypertension (HTN)   Atrial fibrillation/flutter   Angina   ASCVD   Migraine   Heart Failure   Tremor   Anxiety   Ocular hypertension   Glaucoma   PTSD   Obstructive hypertrophic cardiomyopathy   History of myocarditis   Palpitations   POTS (postural orthostatic tachycardia syndrome)   SVT (supraventricular tachycardia)   Hyperthyroidism   Tachycardia   Acute non-st segment elevation myocardial infarction   Subsequent non-st segment elevation myocardial infarction   Ischemic myocardial dysfunction          Passed - Most recent blood pressure under 140/90 in past 12 months     BP Readings from Last 3 Encounters:   08/13/24 102/61   06/19/24 119/76   05/31/24 110/66       No data recorded      The sw spoke to Manju from [facility] and she states the pt's clear to arrive. She gave the sw the contact info for the pt's nurse to call report [phone]. The sw spoke to the pt's nurse and gave her the info mentioned above. The sw placed the copied chart in the pt's cubby. Manju asked this sw to arrange a transport after 1pm for this pt. The ashwin requested a 1:00pm w/c van transport via [facility] for the pt. The ashwin spoke to the pt and informed he rof the info mentioned above and she's in agreement with the d/c plan. The pt states she will notify her family of this info.           Passed - Patient is age 6 or older        Passed - Medication is active on med list and the sig matches. RN to manually verify dose and sig if red X/fail.     If the protocol passes (green check), you do not need to verify med dose and sig.    A prescription matches if they are the same clinical intention.    For Example: once daily and every morning are the same.    For all fails (red x), verify dose and sig.    If the refill does match what is on file, the RN can still proceed to approve the refill request.     If they do not match, route to the appropriate provider.             Passed - Recent (12 mo) or future (90 days) visit within the authorizing provider's specialty     The patient must have completed an in-person or virtual visit within the past 12 months or has a future visit scheduled within the next 90 days with the authorizing provider s specialty.  Urgent care and e-visits do not qualify as an office visit for this protocol.            celecoxib (CELEBREX) 100 MG capsule 90 capsule 0     Sig: Take 1 capsule (100 mg) by mouth daily.       NSAID Medications Failed - 2/17/2025 11:01 AM        Failed - Always Fail Criteria - Chart Review Required     Validate Diagnosis. If the medication is requested for an acute flare of a chronic pain associated with a musculoskeletal or rheumatologic condition; okay to authorize if all other criteria are met. If not, then forward to provider for review.          Passed - Most recent blood pressure under 140/90 in past 12 months     BP Readings from Last 3 Encounters:   08/13/24 102/61   06/19/24 119/76   05/31/24 110/66       No data recorded            Passed - Patient is age 6-64 years        Passed - Normal CBC on file in past 12 months     Recent Labs   Lab Test 08/07/24  1526   WBC 7.4   RBC 4.38   HGB 14.9   HCT 42.6                    Passed - Medication is active on med list and the sig matches. RN to manually verify dose and sig if red X/fail.      If the protocol passes (green check), you do not need to verify med dose and sig.    A prescription matches if they are the same clinical intention.    For Example: once daily and every morning are the same.    For all fails (red x), verify dose and sig.    If the refill does match what is on file, the RN can still proceed to approve the refill request.     If they do not match, route to the appropriate provider.             Passed - Normal GFR on file in past 12 months     Recent Labs   Lab Test 08/07/24  1526 11/29/21  1546 05/21/21  0855   GFRESTIMATED >90   < > >90   GFRESTBLACK  --   --  >90    < > = values in this interval not displayed.             Passed - Recent (12 mo) or future (90 days) visit within the authorizing provider's specialty     The patient must have completed an in-person or virtual visit within the past 12 months or has a future visit scheduled within the next 90 days with the authorizing provider s specialty.  Urgent care and e-visits do not qualify as an office visit for this protocol.          Passed - No active pregnancy on record        Passed - No positive pregnancy test in past 12 months         Signed Prescriptions Disp Refills    hydrOXYzine HCl (ATARAX) 25 MG tablet 180 tablet 0     Sig: Take 1 to 2 tablets by mouth daily as needed for Anxiety or Insomnia       Antihistamines Protocol Passed - 2/17/2025 11:01 AM        Passed - Medication is active on med list and the sig matches. RN to manually verify dose and sig if red X/fail.     If the protocol passes (green check), you do not need to verify med dose and sig.    A prescription matches if they are the same clinical intention.    For Example: once daily and every morning are the same.    For all fails (red x), verify dose and sig.    If the refill does match what is on file, the RN can still proceed to approve the refill request.     If they do not match, route to the appropriate provider.             Passed - Recent (12 month)  or future (90 days) visit with authorizing provider s specialty     The patient must have completed an in-person or virtual visit within the past 12 months or has a future visit scheduled within the next 90 days with the authorizing provider s specialty.  Urgent care and e-visits do not qualify as an office visit for this protocol.          Passed - Medication indicated for associated diagnosis     The medication is associated with one or more of the following diagnoses:  Allergies  Rhinitis  Upper respiratory tract allergy  Urticaria  Itching  Cystic Fibrosis  Bronchiectasis            levothyroxine (SYNTHROID/LEVOTHROID) 137 MCG tablet 90 tablet 0     Sig: Take 1 tablet (137 mcg) by mouth daily.       Thyroid Protocol Passed - 2/17/2025 11:01 AM        Passed - Patient is 12 years or older        Passed - Medication is active on med list and the sig matches. RN to manually verify dose and sig if red X/fail.     If the protocol passes (green check), you do not need to verify med dose and sig.    A prescription matches if they are the same clinical intention.    For Example: once daily and every morning are the same.    For all fails (red x), verify dose and sig.    If the refill does match what is on file, the RN can still proceed to approve the refill request.     If they do not match, route to the appropriate provider.             Passed - Recent (12 mo) or future (90 days) visit within the authorizing provider's specialty     The patient must have completed an in-person or virtual visit within the past 12 months or has a future visit scheduled within the next 90 days with the authorizing provider s specialty.  Urgent care and e-visits do not qualify as an office visit for this protocol.          Passed - Medication indicated for associated diagnosis     Medication is associated with one or more of the following diagnoses:  Hypothyroidism  Thyroid stimulating hormone suppression therapy  Thyroid cancer  Acquired  atrophy of thyroid          Passed - Normal TSH on file in past 12 months     Recent Labs   Lab Test 05/31/24  1025   TSH 1.29              Passed - No active pregnancy on record     If patient is pregnant or has had a positive pregnancy test, please check TSH.          Passed - No positive pregnancy test in past 12 months     If patient is pregnant or has had a positive pregnancy test, please check TSH.

## 2025-02-17 NOTE — TELEPHONE ENCOUNTER
Prior Authorization Approval    Medication: AMJEVITA 40 MG/0.4ML SC SOAJ  Authorization Effective Date: 2/11/2025  Authorization Expiration Date: 2/11/2026  Approved Dose/Quantity: 0.8 ML per 28 day supply  Reference #: [id number] (Key: [id number])   Insurance Company: Comment:  [employer]  Expected CoPay: $    CoPay Card Available: Other (see comments)    Financial Assistance Needed: [email or web address]  Which Pharmacy is filling the prescription: [id number] - [place], [place] - [address]  Pharmacy Notified: [facility] OFFERED  Patient Notified:  OFFERED        Thank You,     Marquita Zaldivar [facility]  Specialty Pharmacy Clinic [facility] Specialty  [email or web address]  [email or web address]  Phone: [phone]  Fax: [phone]

## 2025-02-17 NOTE — TELEPHONE ENCOUNTER
Approval letter:        Thank You,     Marquita Zaldivar CPhT  Specialty Pharmacy Clinic [facility] Specialty  [email or web address]  [email or web address]  Phone: [phone]  Fax: [phone]

## 2025-02-18 NOTE — TELEPHONE ENCOUNTER
Patient accepted offer for [facility].    Thank You,     Marquita Zaldivar [facility]  Specialty Pharmacy Clinic [facility] Specialty  [email or web address]  [email or web address]  Phone: [phone]  Fax: [phone]

## 2025-02-19 DIAGNOSIS — E78.5 HYPERLIPIDEMIA LDL GOAL <100: ICD-10-CM

## 2025-02-19 RX ORDER — ATORVASTATIN CALCIUM 10 MG/1
10 TABLET, FILM COATED ORAL DAILY
Qty: 90 TABLET | Refills: 0 | Status: SHIPPED | OUTPATIENT
Start: 2025-02-19

## 2025-04-02 ENCOUNTER — TELEPHONE (OUTPATIENT)
Dept: FAMILY MEDICINE | Facility: CLINIC | Age: 60
End: 2025-04-02
Payer: COMMERCIAL

## 2025-04-02 NOTE — TELEPHONE ENCOUNTER
Patient Quality Outreach    Patient is due for the following:   Breast Cancer Screening - Mammogram    Action(s) Taken:   Due for mammogram     Type of outreach:    Sent letter.    Questions for provider review:    None         Zainab Garcia CMA  Chart routed to None.

## 2025-04-02 NOTE — LETTER
April 2, 2025    To  Tequila Neumann  [address] [address] [address]    Your team at [facility] cares about your health. We have reviewed your chart and based on our findings; we are making the following recommendations to better manage your health.     You are in particular need of attention regarding the following:     Schedule Annual MAMMOGRAPHY. [facility] scheduling number is [phone] or schedule in [facility] (self referral).    If you have already completed these items, please contact the clinic via phone or   [facility] so your care team can review and update your records. Thank you for   choosing [facility] for your healthcare needs. For any questions,   concerns, or to schedule an appointment please contact our clinic.    Healthy Regards,      Your [facility] Care Team            Electronically signed

## 2025-04-25 ENCOUNTER — MYC MEDICAL ADVICE (OUTPATIENT)
Dept: RHEUMATOLOGY | Facility: CLINIC | Age: 60
End: 2025-04-25
Payer: COMMERCIAL

## 2025-04-25 DIAGNOSIS — E03.9 HYPOTHYROIDISM, UNSPECIFIED TYPE: ICD-10-CM

## 2025-04-25 DIAGNOSIS — M06.09 RHEUMATOID ARTHRITIS OF MULTIPLE SITES WITH NEGATIVE RHEUMATOID FACTOR (H): Primary | ICD-10-CM

## 2025-04-28 ENCOUNTER — PATIENT OUTREACH (OUTPATIENT)
Dept: CARE COORDINATION | Facility: CLINIC | Age: 60
End: 2025-04-28
Payer: COMMERCIAL

## 2025-04-28 RX ORDER — LEVOTHYROXINE SODIUM 137 UG/1
137 TABLET ORAL DAILY
Qty: 90 TABLET | Refills: 0 | Status: SHIPPED | OUTPATIENT
Start: 2025-04-28

## 2025-04-30 ENCOUNTER — PATIENT OUTREACH (OUTPATIENT)
Dept: CARE COORDINATION | Facility: CLINIC | Age: 60
End: 2025-04-30
Payer: COMMERCIAL

## 2025-04-30 NOTE — TELEPHONE ENCOUNTER
Per patient, she has been contacted regarding referral for [facility] but was wanting it sent to [facility] in [place], [place].     Phone #: [phone]

## 2025-05-08 ENCOUNTER — PATIENT OUTREACH (OUTPATIENT)
Dept: CARE COORDINATION | Facility: CLINIC | Age: 60
End: 2025-05-08
Payer: COMMERCIAL

## 2025-05-14 ENCOUNTER — LAB (OUTPATIENT)
Dept: LAB | Facility: CLINIC | Age: 60
End: 2025-05-14
Payer: COMMERCIAL

## 2025-05-14 ENCOUNTER — OFFICE VISIT (OUTPATIENT)
Dept: FAMILY MEDICINE | Facility: CLINIC | Age: 60
End: 2025-05-14
Payer: COMMERCIAL

## 2025-05-14 VITALS
OXYGEN SATURATION: 94 % | SYSTOLIC BLOOD PRESSURE: 106 MMHG | WEIGHT: 165 LBS | HEIGHT: 66 IN | BODY MASS INDEX: 26.52 KG/M2 | HEART RATE: 78 BPM | DIASTOLIC BLOOD PRESSURE: 70 MMHG | TEMPERATURE: 96.5 F

## 2025-05-14 DIAGNOSIS — G89.29 CHRONIC PAIN OF BOTH KNEES: ICD-10-CM

## 2025-05-14 DIAGNOSIS — M25.561 CHRONIC PAIN OF BOTH KNEES: ICD-10-CM

## 2025-05-14 DIAGNOSIS — M25.562 CHRONIC PAIN OF BOTH KNEES: ICD-10-CM

## 2025-05-14 DIAGNOSIS — E89.0 POSTABLATIVE HYPOTHYROIDISM: Primary | ICD-10-CM

## 2025-05-14 DIAGNOSIS — E89.0 POSTABLATIVE HYPOTHYROIDISM: ICD-10-CM

## 2025-05-14 LAB — TSH SERPL DL<=0.05 MIU/L-ACNC: 2.52 UIU/ML (ref 0.34–4.82)

## 2025-05-14 PROCEDURE — 99212 OFFICE O/P EST SF 10 MIN: CPT | Performed by: PHYSICIAN ASSISTANT

## 2025-05-14 PROCEDURE — 36415 COLL VENOUS BLD VENIPUNCTURE: CPT | Performed by: PHYSICIAN ASSISTANT

## 2025-05-14 RX ORDER — ADALIMUMAB-ATTO 40 MG/.4ML
40 INJECTION SUBCUTANEOUS
COMMUNITY
Start: 2025-02-18

## 2025-05-14 ASSESSMENT — PAIN SCALES - GENERAL: PAINLEVEL_OUTOF10: 4

## 2025-05-14 NOTE — PROGRESS NOTES
History of Present Illness  Dana Mac is a 60 year old female with rheumatoid arthritis who presents with multiple bug bites and associated symptoms.    She has multiple bug bites on her face, neck, and right upper arm, which have developed into itchy lumps with pus. Swelling, especially around her right eye, was significant but has improved. She uses Dermacel cream and Benadryl, though the latter is not very effective.    She is on immunosuppressive medication for rheumatoid arthritis and is concerned about taking it due to her current symptoms.    She experiences knee issues, including a Baker's cyst behind her right knee and lateral knee pain on the left, with a history of meniscus surgery. She remains active with hiking and horseback riding.    Current Outpatient Medications on File Prior to Visit   Medication Sig Dispense Refill    Amjevita,CF, Autoinjector 40 mg/0.4 mL auto-injector Inject 40 mg under the skin      desonide (DESOWEN) 0.05 % cream Apply topically 2 times daily As needed      hydrOXYzine (ATARAX) 25 mg tablet Take 1-2 tablets (25-50 mg total) by mouth      methocarbamoL (ROBAXIN) 500 mg tablet PRN      levothyroxine (SYNTHROID, LEVOTHROID) 137 mcg tablet Take 137 mcg by mouth daily 90 tablet 2    celecoxib (CeleBREX) 100 mg capsule Take 1 capsule (100 mg total) by mouth daily Taking as needed for pain (Patient taking differently: Take 1 capsule (100 mg total) by mouth as needed Taking as needed for pain)      folic acid 1 mg tablet Take 1 tablet (1 mg total) by mouth daily      methotrexate 2.5 mg tablet Take 8 tablets (20 mg total) by mouth once a week      propranoloL (INDERAL) 60 mg tablet Take 1 tablet (60 mg total) by mouth 2 (two) times a day Takes when needed (Patient taking differently: Take 1 tablet (60 mg total) by mouth as needed Takes when needed)      atorvastatin (LIPITOR) 10 mg tablet Take 1 tablet (10 mg total) by mouth daily (Patient not taking: Reported on 5/14/2025)       "cholecalciferol, vitamin D3, 25 mcg (1,000 unit) tablet Take 1 tablet (1,000 Units total) by mouth daily (Patient not taking: Reported on 5/14/2025)       No current facility-administered medications on file prior to visit.     Vitals:    05/14/25 1005   BP: 106/70   Temp: (!) 35.8 °C (96.5 °F)   TempSrc: Temporal   Pulse: 78   SpO2: 94%   Height: 1.676 m (5' 6\")   Weight: 74.8 kg (165 lb)   PainSc:   4   PainLoc: Knee   Pain Education: Yes   Patient Position: Sitting     Physical Exam  NECK: Cervical and occipital lymphadenopathy.  SKIN: Multiple bug bites on her face, neck and right upper arm. None appear infected. One is very close to her eye with appearance of a healing stye on her right lower lid present.     Assessment & Plan  Allergic reaction to insect bites  Multiple insect bites with lymphadenopathy, no infection. Reaction likely exacerbated by RA medication (immunocompromised state). Potential increased histamine response with repeated exposure.  - Recommend Benadryl cream for itch and swelling.  - Advise oral Benadryl or Xyzal for systemic reaction.  - Instruct to avoid scratching.  - Educate on preemptive Xyzal use if exposure anticipated.    Stye of right eye  Recent stye likely secondary to insect bite, improved with hot packing.  - Continue hot packing.    Possible Baker's cyst  Right posterior knee pain with possible Baker's cyst.  - Refer to orthopedic specialist.    Left lateral knee pain  Left lateral knee pain post-meniscus surgery, possible arthritis or ligament involvement. Declined x-rays.  - Refer to orthopedic specialist.    General Health Maintenance  Thyroid function monitoring required.  - Order thyroid function test.    Follow-up  Coordination of care with orthopedic specialist and thyroid function follow-up.  - Expect call from [facility] for orthopedic appointment.  - Follow up with thyroid test results.      "

## 2025-05-20 ENCOUNTER — OFFICE VISIT (OUTPATIENT)
Dept: ORTHOPEDIC SURGERY | Facility: CLINIC | Age: 60
End: 2025-05-20
Payer: COMMERCIAL

## 2025-05-20 ENCOUNTER — ANCILLARY PROCEDURE (OUTPATIENT)
Dept: RADIOLOGY | Facility: CLINIC | Age: 60
End: 2025-05-20
Payer: COMMERCIAL

## 2025-05-20 VITALS — SYSTOLIC BLOOD PRESSURE: 128 MMHG | DIASTOLIC BLOOD PRESSURE: 82 MMHG

## 2025-05-20 DIAGNOSIS — M25.562 CHRONIC PAIN OF BOTH KNEES: ICD-10-CM

## 2025-05-20 DIAGNOSIS — M25.561 CHRONIC PAIN OF BOTH KNEES: ICD-10-CM

## 2025-05-20 DIAGNOSIS — G89.29 CHRONIC PAIN OF BOTH KNEES: ICD-10-CM

## 2025-05-20 PROCEDURE — 73564 X-RAY EXAM KNEE 4 OR MORE: CPT | Mod: TC,LT | Performed by: PHYSICIAN ASSISTANT

## 2025-05-20 PROCEDURE — 73564 X-RAY EXAM KNEE 4 OR MORE: CPT | Mod: TC,RT | Performed by: PHYSICIAN ASSISTANT

## 2025-05-20 PROCEDURE — 20610 DRAIN/INJ JOINT/BURSA W/O US: CPT | Mod: 50 | Performed by: PHYSICIAN ASSISTANT

## 2025-05-20 PROCEDURE — 73564 X-RAY EXAM KNEE 4 OR MORE: CPT | Mod: 26,RT | Performed by: INTERNAL MEDICINE

## 2025-05-20 PROCEDURE — 73564 X-RAY EXAM KNEE 4 OR MORE: CPT | Mod: 26,LT | Performed by: INTERNAL MEDICINE

## 2025-05-20 RX ADMIN — METHYLPREDNISOLONE ACETATE 80 MG: 80 INJECTION, SUSPENSION INTRA-ARTICULAR; INTRALESIONAL; INTRAMUSCULAR; SOFT TISSUE at 14:16

## 2025-05-20 RX ADMIN — ROPIVACAINE HYDROCHLORIDE 3 ML: 5 INJECTION, SOLUTION EPIDURAL; INFILTRATION; PERINEURAL at 14:16

## 2025-05-20 ASSESSMENT — PAIN - FUNCTIONAL ASSESSMENT: PAIN_FUNCTIONAL_ASSESSMENT: 0-10

## 2025-05-20 NOTE — PROGRESS NOTES
Subjective   Patient ID: Dana Mac is a 60 y.o. female.    Chief Complaint:  Chief Complaint   Patient presents with    Knee Pain     Bilateral knee pain. Right knee: backers cyst located behind the knee and she feels it  lock up above th knee cap. Pain 3/10. Left knee: Meniscus surgery 20 years ago and she feels like left knee is starting to give out. Pain 3/10. Ibu for inflammation.        60-year-old female, unaccompanied the office today, presents with bilateral knee pain.  This has been chronic problem for her but has been progressively worsening over the last couple years and several months.  She denies any new injuries or recent falls.  She notes significant swelling in the right knee and thinks she possibly has a Baker's cyst.  She does note that the cyst behind the right knee does consistently fluctuate in size.  She does state that she did have a remote trauma in this right knee and at one point someone had recommended knee replacement.  On the left side, she did have a meniscectomy about 20 years ago.  She notes that the left knee is having some mechanical symptoms and feels like it wants to shifting give out on her.  She feels this mostly through the lateral side of the patella.  She is not requiring assistive devices to aid in ambulation.  She denies paresthesias to the bilateral lower extremities.        Social History     Occupational History    Not on file   Tobacco Use    Smoking status: Former     Current packs/day: 0.00     Average packs/day: 0.5 packs/day for 30.0 years (15.0 ttl pk-yrs)     Types: Cigarettes     Start date: 1993     Quit date: 2023     Years since quittin.3    Smokeless tobacco: Never   Vaping Use    Vaping status: Never Used   Substance and Sexual Activity    Alcohol use: Yes     Comment: occasional    Drug use: Never    Sexual activity: Not on file      Review of Systems   Constitutional:  Positive for activity change. Negative for chills and fever.   HENT:   Negative for congestion and sore throat.    Respiratory:  Negative for cough and shortness of breath.    Cardiovascular:  Negative for chest pain.   Musculoskeletal:  Positive for arthralgias, gait problem and joint swelling.   Skin: Negative.    Psychiatric/Behavioral: Negative.               Objective     Ortho Exam     General: 60-year-old female, unaccompanied the office today, alert oriented x 3, no acute distress  Right Knee:  Moderate effusion, no erythema, no ecchymosis  Moderate medial joint line tenderness  Moderate patellofemoral crepitus  There is a soft discrete fullness through the popliteal fossa  Active range of motion 0-120  Knee is stable in varus and valgus stress testing  Negative anterior posterior drawer  Normal pain-free motion at the right hip  Left Knee:  No significant effusion, no erythema, no ecchymosis  Moderate medial joint line tenderness  Moderate patellofemoral crepitus  Active range of motion is within normal limits of flexion and extension  Knee is stable varus valgus stress testing  Negative anterior posterior drawer  Normal pain-free motion of the left hip              Assessment   Diagnoses and all orders for this visit:    Chronic pain of both knees  -     X-ray knee 4 or more views right  -     X-ray knee 4 or more views left  -     Ambulatory referral to Orthopedic Surgery              Plan    Bilateral Knee Pain:  X-rays of the bilateral knees are reviewed today.  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  On the right side you can see on the x-ray a large lesion in the popliteal fossa, most likely representing a Baker's cyst.  Clinically, the lesion does behave as a Baker's cyst as she describes consistent fluctuation of size.  It has been present for years.    Today, I recommended moving forward with intra-articular steroid injections bilaterally and  then monitor her symptoms.  If she does fail to see significant improvement in her symptoms, I would recommend moving forward with advanced imaging.  An MRI would give us the benefits of further evaluation of the large popliteal cyst.    We discussed diagnosis of arthritis versus meniscus pathology.  I do feel the mechanical symptoms she is feeling on the left side are likely from the patellofemoral articulation.

## 2025-05-20 NOTE — PROGRESS NOTES
Bilateral Knee Steroid Injections  Procedure was performed bilaterally. Date/Time: 5/20/2025 2:16 PM  Performed by: Lisa Bailey PA-C       Consent    Verbal consent was obtained from the patient. Written consent was not obtained from the patient. Risks, benefits, and alternatives were discussed. Consent given by patient. The patient states understanding of the procedure being performed. Patient ID confirmed verbally with the patient.       Wound 1 Procedure Details  An injection was given to Dana in her knee. The injection site was the Bilateral intraarticular.    Procedure Details   Patient was prepped and draped in the usual sterile fashion. Ethyl chloride spray local anesthesia was used.   A 22 gauge needle was inserted into the  using a anterolateral approach . Aspirate was not obtained.   An injection was given in the  Bilateral intraarticular site(s). The left site was injected with 80 mg methylPREDNISolone acetate 80 mg/mL, 3 mL ROPivacaine 5 mg/mL (0.5 %).  The right site was injected with 80 mg methylPREDNISolone acetate 80 mg/mL, 3 mL ROPivacaine 5 mg/mL (0.5 %), .   Needle removed without complication.     Post-Procedure  Patient tolerated the procedure well with no immediate complications. A standard bandage was applied. Advised patient to avoid strenous activity for 24-48 hours. Advised patient to use ice, NSAIDs or acetaminophen for pain as needed.     Procedure Comments  80 mg Depomedrol and 3 ml of 0.5% ropivacaine injected into bilateral knees

## 2025-05-21 RX ORDER — METHYLPREDNISOLONE ACETATE 80 MG/ML
80 INJECTION, SUSPENSION INTRA-ARTICULAR; INTRALESIONAL; INTRAMUSCULAR; SOFT TISSUE
Status: COMPLETED | OUTPATIENT
Start: 2025-05-20 | End: 2025-05-20

## 2025-05-21 RX ORDER — ROPIVACAINE HYDROCHLORIDE 5 MG/ML
3 INJECTION, SOLUTION EPIDURAL; INFILTRATION; PERINEURAL
Status: COMPLETED | OUTPATIENT
Start: 2025-05-20 | End: 2025-05-20

## 2025-05-21 ASSESSMENT — ENCOUNTER SYMPTOMS
PSYCHIATRIC NEGATIVE: 1
FEVER: 0
SORE THROAT: 0
ACTIVITY CHANGE: 1
SHORTNESS OF BREATH: 0
ARTHRALGIAS: 1
COUGH: 0
CHILLS: 0
JOINT SWELLING: 1

## 2025-05-27 ENCOUNTER — TELEPHONE (OUTPATIENT)
Dept: ORTHOPEDIC SURGERY | Facility: CLINIC | Age: 60
End: 2025-05-27
Payer: COMMERCIAL

## 2025-05-27 ENCOUNTER — RESULTS FOLLOW-UP (OUTPATIENT)
Dept: FAMILY MEDICINE | Facility: CLINIC | Age: 60
End: 2025-05-27
Payer: COMMERCIAL

## 2025-05-27 NOTE — TELEPHONE ENCOUNTER
Secure VM left with results. Pt informed to return call to clinic if she has any questions or needs more Levothyroxine.

## 2025-06-02 ENCOUNTER — TELEPHONE (OUTPATIENT)
Dept: ORTHOPEDIC SURGERY | Facility: CLINIC | Age: 60
End: 2025-06-02
Payer: COMMERCIAL

## 2025-06-02 DIAGNOSIS — M23.8X9: Primary | ICD-10-CM

## 2025-06-02 DIAGNOSIS — M23.92 LOCKING OF LEFT KNEE: ICD-10-CM

## 2025-06-02 DIAGNOSIS — M23.91 LOCKING OF RIGHT KNEE: ICD-10-CM

## 2025-06-02 DIAGNOSIS — M25.462 PAIN AND SWELLING OF LEFT KNEE: ICD-10-CM

## 2025-06-02 DIAGNOSIS — M25.562 PAIN AND SWELLING OF LEFT KNEE: ICD-10-CM

## 2025-06-02 DIAGNOSIS — M25.461 PAIN AND SWELLING OF RIGHT KNEE: ICD-10-CM

## 2025-06-02 DIAGNOSIS — M25.561 PAIN AND SWELLING OF RIGHT KNEE: ICD-10-CM

## 2025-06-02 NOTE — TELEPHONE ENCOUNTER
Returned pt's call. She was wondering if we would order an MRI for both knees. Her right hurts her the most but the left has locking and pain. If only one then she wants her right knee. Consulted KARTHIK Gonzalez and she is okay ordering both knee MRIs. MRI order placed.

## 2025-06-26 ENCOUNTER — TELEPHONE (OUTPATIENT)
Dept: ORTHOPEDIC SURGERY | Facility: CLINIC | Age: 60
End: 2025-06-26
Payer: COMMERCIAL

## 2025-07-05 ENCOUNTER — HEALTH MAINTENANCE LETTER (OUTPATIENT)
Age: 60
End: 2025-07-05

## 2025-07-20 DIAGNOSIS — E03.9 HYPOTHYROIDISM, UNSPECIFIED TYPE: ICD-10-CM

## 2025-07-20 DIAGNOSIS — G47.00 INSOMNIA, UNSPECIFIED TYPE: ICD-10-CM

## 2025-07-21 RX ORDER — HYDROXYZINE HYDROCHLORIDE 25 MG/1
TABLET, FILM COATED ORAL
Qty: 60 TABLET | Refills: 0 | Status: SHIPPED | OUTPATIENT
Start: 2025-07-21

## 2025-07-21 RX ORDER — LEVOTHYROXINE SODIUM 137 UG/1
137 TABLET ORAL DAILY
Qty: 30 TABLET | Refills: 0 | Status: SHIPPED | OUTPATIENT
Start: 2025-07-21

## 2025-08-11 ENCOUNTER — E-VISIT (OUTPATIENT)
Dept: URGENT CARE | Facility: CLINIC | Age: 60
End: 2025-08-11
Payer: COMMERCIAL

## 2025-08-11 DIAGNOSIS — H10.33 ACUTE CONJUNCTIVITIS OF BOTH EYES, UNSPECIFIED ACUTE CONJUNCTIVITIS TYPE: Primary | ICD-10-CM

## 2025-08-11 RX ORDER — OFLOXACIN 3 MG/ML
SOLUTION/ DROPS OPHTHALMIC
Qty: 5 ML | Refills: 0 | Status: SHIPPED | OUTPATIENT
Start: 2025-08-11 | End: 2025-08-18

## (undated) DEVICE — ENDO SNARE EXACTO COLD 9MM LOOP 2.4MMX230CM 00711115

## (undated) RX ORDER — LIDOCAINE HYDROCHLORIDE 10 MG/ML
INJECTION, SOLUTION EPIDURAL; INFILTRATION; INTRACAUDAL; PERINEURAL
Status: DISPENSED
Start: 2024-08-13

## (undated) RX ORDER — PROPOFOL 10 MG/ML
INJECTION, EMULSION INTRAVENOUS
Status: DISPENSED
Start: 2024-08-13